# Patient Record
Sex: FEMALE | Race: WHITE | Employment: OTHER | URBAN - METROPOLITAN AREA
[De-identification: names, ages, dates, MRNs, and addresses within clinical notes are randomized per-mention and may not be internally consistent; named-entity substitution may affect disease eponyms.]

---

## 2017-01-11 ENCOUNTER — OFFICE VISIT (OUTPATIENT)
Dept: INTERNAL MEDICINE CLINIC | Age: 67
End: 2017-01-11

## 2017-01-11 ENCOUNTER — CARE COORDINATOR VISIT (OUTPATIENT)
Dept: INTERNAL MEDICINE CLINIC | Age: 67
End: 2017-01-11

## 2017-01-11 VITALS
BODY MASS INDEX: 29.43 KG/M2 | HEIGHT: 59 IN | SYSTOLIC BLOOD PRESSURE: 134 MMHG | DIASTOLIC BLOOD PRESSURE: 70 MMHG | HEART RATE: 64 BPM | WEIGHT: 146 LBS

## 2017-01-11 DIAGNOSIS — I10 BENIGN ESSENTIAL HYPERTENSION: ICD-10-CM

## 2017-01-11 DIAGNOSIS — C34.11 MALIGNANT NEOPLASM OF UPPER LOBE OF RIGHT LUNG (HCC): ICD-10-CM

## 2017-01-11 DIAGNOSIS — M84.375A STRESS FRACTURE OF LEFT FOOT, INITIAL ENCOUNTER: Primary | ICD-10-CM

## 2017-01-11 DIAGNOSIS — E05.90 HYPERTHYROIDISM: ICD-10-CM

## 2017-01-11 PROCEDURE — 99213 OFFICE O/P EST LOW 20 MIN: CPT | Performed by: INTERNAL MEDICINE

## 2017-01-12 LAB
ESTIMATED AVERAGE GLUCOSE: 168.6 MG/DL
HBA1C MFR BLD: 7.5 %

## 2017-02-02 ENCOUNTER — HOSPITAL ENCOUNTER (OUTPATIENT)
Dept: NUCLEAR MEDICINE | Age: 67
Discharge: HOME | End: 2017-02-02
Attending: INTERNAL MEDICINE | Admitting: INTERNAL MEDICINE

## 2017-02-02 DIAGNOSIS — M84.375A STRESS FRACTURE OF LEFT FOOT, INITIAL ENCOUNTER: ICD-10-CM

## 2017-02-02 RX ORDER — TC 99M MEDRONATE 20 MG/10ML
25 INJECTION, POWDER, LYOPHILIZED, FOR SOLUTION INTRAVENOUS
Status: COMPLETED | OUTPATIENT
Start: 2017-02-02 | End: 2017-02-02

## 2017-02-02 RX ADMIN — TC 99M MEDRONATE 25 MILLICURIE: 20 INJECTION, POWDER, LYOPHILIZED, FOR SOLUTION INTRAVENOUS at 08:22

## 2017-02-22 ENCOUNTER — OFFICE VISIT (OUTPATIENT)
Dept: INTERNAL MEDICINE CLINIC | Age: 67
End: 2017-02-22

## 2017-02-22 VITALS
HEART RATE: 64 BPM | WEIGHT: 150 LBS | BODY MASS INDEX: 30.3 KG/M2 | DIASTOLIC BLOOD PRESSURE: 76 MMHG | SYSTOLIC BLOOD PRESSURE: 136 MMHG

## 2017-02-22 DIAGNOSIS — L40.9 SCALP PSORIASIS: Primary | ICD-10-CM

## 2017-02-22 DIAGNOSIS — M79.672 LEFT FOOT PAIN: Chronic | ICD-10-CM

## 2017-02-22 PROCEDURE — 4040F PNEUMOC VAC/ADMIN/RCVD: CPT | Performed by: INTERNAL MEDICINE

## 2017-02-22 PROCEDURE — G8427 DOCREV CUR MEDS BY ELIG CLIN: HCPCS | Performed by: INTERNAL MEDICINE

## 2017-02-22 PROCEDURE — 1090F PRES/ABSN URINE INCON ASSESS: CPT | Performed by: INTERNAL MEDICINE

## 2017-02-22 PROCEDURE — G8417 CALC BMI ABV UP PARAM F/U: HCPCS | Performed by: INTERNAL MEDICINE

## 2017-02-22 PROCEDURE — 99213 OFFICE O/P EST LOW 20 MIN: CPT | Performed by: INTERNAL MEDICINE

## 2017-02-22 PROCEDURE — 3017F COLORECTAL CA SCREEN DOC REV: CPT | Performed by: INTERNAL MEDICINE

## 2017-02-22 PROCEDURE — 3014F SCREEN MAMMO DOC REV: CPT | Performed by: INTERNAL MEDICINE

## 2017-02-22 PROCEDURE — G8484 FLU IMMUNIZE NO ADMIN: HCPCS | Performed by: INTERNAL MEDICINE

## 2017-02-22 PROCEDURE — 1123F ACP DISCUSS/DSCN MKR DOCD: CPT | Performed by: INTERNAL MEDICINE

## 2017-02-22 PROCEDURE — 1036F TOBACCO NON-USER: CPT | Performed by: INTERNAL MEDICINE

## 2017-02-22 PROCEDURE — G8399 PT W/DXA RESULTS DOCUMENT: HCPCS | Performed by: INTERNAL MEDICINE

## 2017-02-22 RX ORDER — TRIAMCINOLONE ACETONIDE 1 MG/G
CREAM TOPICAL
Qty: 453 G | Refills: 3 | Status: SHIPPED | OUTPATIENT
Start: 2017-02-22 | End: 2018-02-26 | Stop reason: ALTCHOICE

## 2017-02-23 ENCOUNTER — OFFICE VISIT (OUTPATIENT)
Dept: ENT CLINIC | Age: 67
End: 2017-02-23

## 2017-02-23 VITALS
WEIGHT: 150 LBS | RESPIRATION RATE: 16 BRPM | BODY MASS INDEX: 29.45 KG/M2 | SYSTOLIC BLOOD PRESSURE: 126 MMHG | DIASTOLIC BLOOD PRESSURE: 81 MMHG | HEIGHT: 60 IN

## 2017-02-23 DIAGNOSIS — H90.5 HEARING DISORDER, SENSORINEURAL: ICD-10-CM

## 2017-02-23 DIAGNOSIS — H93.13 TINNITUS, BILATERAL: Primary | ICD-10-CM

## 2017-02-23 PROBLEM — H93.19 TINNITUS: Status: ACTIVE | Noted: 2017-02-23

## 2017-02-23 PROCEDURE — G8420 CALC BMI NORM PARAMETERS: HCPCS | Performed by: OTOLARYNGOLOGY

## 2017-02-23 PROCEDURE — 3014F SCREEN MAMMO DOC REV: CPT | Performed by: OTOLARYNGOLOGY

## 2017-02-23 PROCEDURE — 1123F ACP DISCUSS/DSCN MKR DOCD: CPT | Performed by: OTOLARYNGOLOGY

## 2017-02-23 PROCEDURE — G8427 DOCREV CUR MEDS BY ELIG CLIN: HCPCS | Performed by: OTOLARYNGOLOGY

## 2017-02-23 PROCEDURE — 1036F TOBACCO NON-USER: CPT | Performed by: AUDIOLOGIST

## 2017-02-23 PROCEDURE — 99203 OFFICE O/P NEW LOW 30 MIN: CPT | Performed by: OTOLARYNGOLOGY

## 2017-02-23 PROCEDURE — G8399 PT W/DXA RESULTS DOCUMENT: HCPCS | Performed by: OTOLARYNGOLOGY

## 2017-02-23 PROCEDURE — 1090F PRES/ABSN URINE INCON ASSESS: CPT | Performed by: OTOLARYNGOLOGY

## 2017-02-23 PROCEDURE — G8484 FLU IMMUNIZE NO ADMIN: HCPCS | Performed by: OTOLARYNGOLOGY

## 2017-02-23 PROCEDURE — 4040F PNEUMOC VAC/ADMIN/RCVD: CPT | Performed by: OTOLARYNGOLOGY

## 2017-02-23 PROCEDURE — 1036F TOBACCO NON-USER: CPT | Performed by: OTOLARYNGOLOGY

## 2017-02-23 PROCEDURE — 3017F COLORECTAL CA SCREEN DOC REV: CPT | Performed by: OTOLARYNGOLOGY

## 2017-02-23 RX ORDER — PREDNISONE 10 MG/1
TABLET ORAL
Qty: 25 TABLET | Refills: 0 | Status: SHIPPED | OUTPATIENT
Start: 2017-02-23 | End: 2017-05-02 | Stop reason: ALTCHOICE

## 2017-02-23 ASSESSMENT — ENCOUNTER SYMPTOMS
SINUS PRESSURE: 0
RESPIRATORY NEGATIVE: 1
EYES NEGATIVE: 1
ALLERGIC/IMMUNOLOGIC NEGATIVE: 1

## 2017-03-07 ENCOUNTER — OFFICE VISIT (OUTPATIENT)
Dept: ORTHOPEDIC SURGERY | Age: 67
End: 2017-03-07

## 2017-03-07 VITALS
HEIGHT: 60 IN | RESPIRATION RATE: 16 BRPM | DIASTOLIC BLOOD PRESSURE: 77 MMHG | BODY MASS INDEX: 29.45 KG/M2 | HEART RATE: 74 BPM | WEIGHT: 150 LBS | SYSTOLIC BLOOD PRESSURE: 127 MMHG

## 2017-03-07 DIAGNOSIS — M16.11 PRIMARY OSTEOARTHRITIS OF RIGHT HIP: ICD-10-CM

## 2017-03-07 DIAGNOSIS — S90.32XA CONTUSION OF LEFT FOOT, INITIAL ENCOUNTER: Primary | ICD-10-CM

## 2017-03-07 PROCEDURE — G8427 DOCREV CUR MEDS BY ELIG CLIN: HCPCS | Performed by: ORTHOPAEDIC SURGERY

## 2017-03-07 PROCEDURE — G8484 FLU IMMUNIZE NO ADMIN: HCPCS | Performed by: ORTHOPAEDIC SURGERY

## 2017-03-07 PROCEDURE — G8420 CALC BMI NORM PARAMETERS: HCPCS | Performed by: ORTHOPAEDIC SURGERY

## 2017-03-07 PROCEDURE — 1036F TOBACCO NON-USER: CPT | Performed by: ORTHOPAEDIC SURGERY

## 2017-03-07 PROCEDURE — 73502 X-RAY EXAM HIP UNI 2-3 VIEWS: CPT | Performed by: ORTHOPAEDIC SURGERY

## 2017-03-07 PROCEDURE — 73630 X-RAY EXAM OF FOOT: CPT | Performed by: ORTHOPAEDIC SURGERY

## 2017-03-07 PROCEDURE — 99203 OFFICE O/P NEW LOW 30 MIN: CPT | Performed by: ORTHOPAEDIC SURGERY

## 2017-03-07 PROCEDURE — 4040F PNEUMOC VAC/ADMIN/RCVD: CPT | Performed by: ORTHOPAEDIC SURGERY

## 2017-03-07 PROCEDURE — 3017F COLORECTAL CA SCREEN DOC REV: CPT | Performed by: ORTHOPAEDIC SURGERY

## 2017-03-07 PROCEDURE — 1090F PRES/ABSN URINE INCON ASSESS: CPT | Performed by: ORTHOPAEDIC SURGERY

## 2017-03-07 PROCEDURE — 3014F SCREEN MAMMO DOC REV: CPT | Performed by: ORTHOPAEDIC SURGERY

## 2017-03-07 RX ORDER — MELOXICAM 7.5 MG/1
7.5 TABLET ORAL DAILY
Qty: 30 TABLET | Refills: 0 | Status: SHIPPED | OUTPATIENT
Start: 2017-03-07 | End: 2017-03-30 | Stop reason: SDUPTHER

## 2017-03-08 PROBLEM — S90.32XA CONTUSION OF LEFT FOOT: Status: ACTIVE | Noted: 2017-03-08

## 2017-03-08 PROBLEM — M16.11 PRIMARY OSTEOARTHRITIS OF RIGHT HIP: Status: ACTIVE | Noted: 2017-03-08

## 2017-03-30 DIAGNOSIS — M16.11 PRIMARY OSTEOARTHRITIS OF RIGHT HIP: ICD-10-CM

## 2017-04-03 ENCOUNTER — OFFICE VISIT (OUTPATIENT)
Dept: PULMONOLOGY | Age: 67
End: 2017-04-03

## 2017-04-03 VITALS
BODY MASS INDEX: 30.27 KG/M2 | WEIGHT: 155 LBS | HEART RATE: 78 BPM | SYSTOLIC BLOOD PRESSURE: 122 MMHG | DIASTOLIC BLOOD PRESSURE: 71 MMHG

## 2017-04-03 DIAGNOSIS — J44.9 COPD, MODERATE (HCC): Primary | ICD-10-CM

## 2017-04-03 DIAGNOSIS — I10 BENIGN ESSENTIAL HYPERTENSION: ICD-10-CM

## 2017-04-03 DIAGNOSIS — C34.11 MALIGNANT NEOPLASM OF UPPER LOBE OF RIGHT LUNG (HCC): ICD-10-CM

## 2017-04-03 PROCEDURE — 3014F SCREEN MAMMO DOC REV: CPT | Performed by: INTERNAL MEDICINE

## 2017-04-03 PROCEDURE — 1036F TOBACCO NON-USER: CPT | Performed by: INTERNAL MEDICINE

## 2017-04-03 PROCEDURE — 3017F COLORECTAL CA SCREEN DOC REV: CPT | Performed by: INTERNAL MEDICINE

## 2017-04-03 PROCEDURE — 1090F PRES/ABSN URINE INCON ASSESS: CPT | Performed by: INTERNAL MEDICINE

## 2017-04-03 PROCEDURE — G8399 PT W/DXA RESULTS DOCUMENT: HCPCS | Performed by: INTERNAL MEDICINE

## 2017-04-03 PROCEDURE — 4040F PNEUMOC VAC/ADMIN/RCVD: CPT | Performed by: INTERNAL MEDICINE

## 2017-04-03 PROCEDURE — 3045F PR MOST RECENT HEMOGLOBIN A1C LEVEL 7.0-9.0%: CPT | Performed by: INTERNAL MEDICINE

## 2017-04-03 PROCEDURE — 99214 OFFICE O/P EST MOD 30 MIN: CPT | Performed by: INTERNAL MEDICINE

## 2017-04-03 PROCEDURE — G8427 DOCREV CUR MEDS BY ELIG CLIN: HCPCS | Performed by: INTERNAL MEDICINE

## 2017-04-03 PROCEDURE — 3023F SPIROM DOC REV: CPT | Performed by: INTERNAL MEDICINE

## 2017-04-03 PROCEDURE — G8926 SPIRO NO PERF OR DOC: HCPCS | Performed by: INTERNAL MEDICINE

## 2017-04-03 PROCEDURE — G8417 CALC BMI ABV UP PARAM F/U: HCPCS | Performed by: INTERNAL MEDICINE

## 2017-04-03 PROCEDURE — 1123F ACP DISCUSS/DSCN MKR DOCD: CPT | Performed by: INTERNAL MEDICINE

## 2017-04-03 RX ORDER — MELOXICAM 7.5 MG/1
TABLET ORAL
Qty: 30 TABLET | Refills: 0 | Status: SHIPPED | OUTPATIENT
Start: 2017-04-03 | End: 2017-04-26 | Stop reason: SDUPTHER

## 2017-04-12 ENCOUNTER — HOSPITAL ENCOUNTER (OUTPATIENT)
Dept: CT IMAGING | Age: 67
Discharge: HOME | End: 2017-04-12
Attending: INTERNAL MEDICINE | Admitting: INTERNAL MEDICINE

## 2017-04-12 DIAGNOSIS — C34.11 MALIGNANT NEOPLASM OF UPPER LOBE OF RIGHT LUNG (HCC): ICD-10-CM

## 2017-04-12 DIAGNOSIS — C34.11 MALIGNANT NEOPLASM OF UPPER LOBE, RIGHT BRONCHUS OR LUNG (HCC): ICD-10-CM

## 2017-04-26 DIAGNOSIS — M16.11 PRIMARY OSTEOARTHRITIS OF RIGHT HIP: ICD-10-CM

## 2017-04-26 RX ORDER — MELOXICAM 7.5 MG/1
TABLET ORAL
Qty: 30 TABLET | Refills: 0 | Status: SHIPPED | OUTPATIENT
Start: 2017-04-26 | End: 2017-05-29 | Stop reason: SDUPTHER

## 2017-05-02 ENCOUNTER — OFFICE VISIT (OUTPATIENT)
Dept: INTERNAL MEDICINE CLINIC | Age: 67
End: 2017-05-02

## 2017-05-02 VITALS
DIASTOLIC BLOOD PRESSURE: 74 MMHG | BODY MASS INDEX: 30.47 KG/M2 | WEIGHT: 156 LBS | HEART RATE: 64 BPM | SYSTOLIC BLOOD PRESSURE: 120 MMHG

## 2017-05-02 DIAGNOSIS — M79.605 PAIN IN BOTH LOWER EXTREMITIES: Primary | ICD-10-CM

## 2017-05-02 DIAGNOSIS — E05.90 HYPERTHYROIDISM: ICD-10-CM

## 2017-05-02 DIAGNOSIS — I10 BENIGN ESSENTIAL HYPERTENSION: ICD-10-CM

## 2017-05-02 DIAGNOSIS — I87.2 VENOUS INSUFFICIENCY: ICD-10-CM

## 2017-05-02 DIAGNOSIS — R60.0 BILATERAL EDEMA OF LOWER EXTREMITY: ICD-10-CM

## 2017-05-02 DIAGNOSIS — J44.9 COPD, MODERATE (HCC): ICD-10-CM

## 2017-05-02 DIAGNOSIS — M79.604 PAIN IN BOTH LOWER EXTREMITIES: Primary | ICD-10-CM

## 2017-05-02 LAB
ANION GAP SERPL CALCULATED.3IONS-SCNC: 14 MMOL/L (ref 3–16)
BUN BLDV-MCNC: 15 MG/DL (ref 7–20)
CALCIUM SERPL-MCNC: 9.9 MG/DL (ref 8.3–10.6)
CHLORIDE BLD-SCNC: 100 MMOL/L (ref 99–110)
CO2: 26 MMOL/L (ref 21–32)
CREAT SERPL-MCNC: 1.1 MG/DL (ref 0.6–1.2)
GFR AFRICAN AMERICAN: 60
GFR NON-AFRICAN AMERICAN: 49
GLUCOSE BLD-MCNC: 108 MG/DL (ref 70–99)
POTASSIUM SERPL-SCNC: 4.2 MMOL/L (ref 3.5–5.1)
SODIUM BLD-SCNC: 140 MMOL/L (ref 136–145)
T4 FREE: <0.1 NG/DL (ref 0.9–1.8)
TSH SERPL DL<=0.05 MIU/L-ACNC: 77.9 UIU/ML (ref 0.27–4.2)

## 2017-05-02 PROCEDURE — 94640 AIRWAY INHALATION TREATMENT: CPT | Performed by: INTERNAL MEDICINE

## 2017-05-02 PROCEDURE — 99214 OFFICE O/P EST MOD 30 MIN: CPT | Performed by: INTERNAL MEDICINE

## 2017-05-02 PROCEDURE — 3045F PR MOST RECENT HEMOGLOBIN A1C LEVEL 7.0-9.0%: CPT | Performed by: INTERNAL MEDICINE

## 2017-05-02 PROCEDURE — G8926 SPIRO NO PERF OR DOC: HCPCS | Performed by: INTERNAL MEDICINE

## 2017-05-02 PROCEDURE — 3023F SPIROM DOC REV: CPT | Performed by: INTERNAL MEDICINE

## 2017-05-02 PROCEDURE — G8417 CALC BMI ABV UP PARAM F/U: HCPCS | Performed by: INTERNAL MEDICINE

## 2017-05-02 PROCEDURE — 1123F ACP DISCUSS/DSCN MKR DOCD: CPT | Performed by: INTERNAL MEDICINE

## 2017-05-02 PROCEDURE — 3014F SCREEN MAMMO DOC REV: CPT | Performed by: INTERNAL MEDICINE

## 2017-05-02 PROCEDURE — G8399 PT W/DXA RESULTS DOCUMENT: HCPCS | Performed by: INTERNAL MEDICINE

## 2017-05-02 PROCEDURE — G8427 DOCREV CUR MEDS BY ELIG CLIN: HCPCS | Performed by: INTERNAL MEDICINE

## 2017-05-02 PROCEDURE — 1036F TOBACCO NON-USER: CPT | Performed by: INTERNAL MEDICINE

## 2017-05-02 PROCEDURE — 3017F COLORECTAL CA SCREEN DOC REV: CPT | Performed by: INTERNAL MEDICINE

## 2017-05-02 PROCEDURE — 1090F PRES/ABSN URINE INCON ASSESS: CPT | Performed by: INTERNAL MEDICINE

## 2017-05-02 PROCEDURE — 4040F PNEUMOC VAC/ADMIN/RCVD: CPT | Performed by: INTERNAL MEDICINE

## 2017-05-02 RX ORDER — ALBUTEROL SULFATE 2.5 MG/3ML
2.5 SOLUTION RESPIRATORY (INHALATION) ONCE
Status: COMPLETED | OUTPATIENT
Start: 2017-05-02 | End: 2017-05-02

## 2017-05-02 RX ORDER — PREDNISONE 20 MG/1
20 TABLET ORAL DAILY
Qty: 4 TABLET | Refills: 0 | Status: SHIPPED | OUTPATIENT
Start: 2017-05-02 | End: 2017-05-06

## 2017-05-02 RX ADMIN — ALBUTEROL SULFATE 2.5 MG: 2.5 SOLUTION RESPIRATORY (INHALATION) at 15:58

## 2017-05-03 DIAGNOSIS — E05.90 HYPERTHYROIDISM: Primary | ICD-10-CM

## 2017-05-03 LAB
ESTIMATED AVERAGE GLUCOSE: 177.2 MG/DL
HBA1C MFR BLD: 7.8 %

## 2017-05-22 RX ORDER — FLUTICASONE FUROATE AND VILANTEROL 100; 25 UG/1; UG/1
1 POWDER RESPIRATORY (INHALATION) DAILY
Qty: 1 EACH | Refills: 6 | Status: SHIPPED | OUTPATIENT
Start: 2017-05-22 | End: 2018-06-08 | Stop reason: SDUPTHER

## 2017-05-24 ENCOUNTER — OFFICE VISIT (OUTPATIENT)
Dept: INTERNAL MEDICINE CLINIC | Age: 67
End: 2017-05-24

## 2017-05-24 VITALS
BODY MASS INDEX: 29.29 KG/M2 | HEIGHT: 60 IN | WEIGHT: 149.2 LBS | DIASTOLIC BLOOD PRESSURE: 74 MMHG | SYSTOLIC BLOOD PRESSURE: 116 MMHG | HEART RATE: 80 BPM

## 2017-05-24 DIAGNOSIS — F33.42 RECURRENT MAJOR DEPRESSIVE EPISODES, IN FULL REMISSION (HCC): ICD-10-CM

## 2017-05-24 DIAGNOSIS — E05.90 HYPERTHYROIDISM: Primary | ICD-10-CM

## 2017-05-24 PROCEDURE — G8420 CALC BMI NORM PARAMETERS: HCPCS | Performed by: INTERNAL MEDICINE

## 2017-05-24 PROCEDURE — 1123F ACP DISCUSS/DSCN MKR DOCD: CPT | Performed by: INTERNAL MEDICINE

## 2017-05-24 PROCEDURE — G8399 PT W/DXA RESULTS DOCUMENT: HCPCS | Performed by: INTERNAL MEDICINE

## 2017-05-24 PROCEDURE — 1090F PRES/ABSN URINE INCON ASSESS: CPT | Performed by: INTERNAL MEDICINE

## 2017-05-24 PROCEDURE — 3014F SCREEN MAMMO DOC REV: CPT | Performed by: INTERNAL MEDICINE

## 2017-05-24 PROCEDURE — 1036F TOBACCO NON-USER: CPT | Performed by: INTERNAL MEDICINE

## 2017-05-24 PROCEDURE — 4040F PNEUMOC VAC/ADMIN/RCVD: CPT | Performed by: INTERNAL MEDICINE

## 2017-05-24 PROCEDURE — 3017F COLORECTAL CA SCREEN DOC REV: CPT | Performed by: INTERNAL MEDICINE

## 2017-05-24 PROCEDURE — G8427 DOCREV CUR MEDS BY ELIG CLIN: HCPCS | Performed by: INTERNAL MEDICINE

## 2017-05-24 PROCEDURE — 99213 OFFICE O/P EST LOW 20 MIN: CPT | Performed by: INTERNAL MEDICINE

## 2017-05-25 LAB
T4 FREE: 1.2 NG/DL (ref 0.9–1.8)
TSH SERPL DL<=0.05 MIU/L-ACNC: 3.52 UIU/ML (ref 0.27–4.2)

## 2017-05-29 DIAGNOSIS — M16.11 PRIMARY OSTEOARTHRITIS OF RIGHT HIP: ICD-10-CM

## 2017-05-30 RX ORDER — MELOXICAM 7.5 MG/1
TABLET ORAL
Qty: 30 TABLET | Refills: 0 | Status: SHIPPED | OUTPATIENT
Start: 2017-05-30 | End: 2018-02-26 | Stop reason: ALTCHOICE

## 2017-08-23 ENCOUNTER — OFFICE VISIT (OUTPATIENT)
Dept: INTERNAL MEDICINE CLINIC | Age: 67
End: 2017-08-23

## 2017-08-23 VITALS
DIASTOLIC BLOOD PRESSURE: 74 MMHG | HEART RATE: 88 BPM | WEIGHT: 146 LBS | SYSTOLIC BLOOD PRESSURE: 110 MMHG | BODY MASS INDEX: 28.51 KG/M2

## 2017-08-23 DIAGNOSIS — Z13.220 SCREENING FOR HYPERLIPIDEMIA: ICD-10-CM

## 2017-08-23 DIAGNOSIS — M75.101 ROTATOR CUFF SYNDROME, RIGHT: ICD-10-CM

## 2017-08-23 DIAGNOSIS — F33.41 RECURRENT MAJOR DEPRESSIVE DISORDER, IN PARTIAL REMISSION (HCC): Chronic | ICD-10-CM

## 2017-08-23 DIAGNOSIS — Z12.31 ENCOUNTER FOR SCREENING MAMMOGRAM FOR BREAST CANCER: ICD-10-CM

## 2017-08-23 DIAGNOSIS — E05.90 HYPERTHYROIDISM: ICD-10-CM

## 2017-08-23 DIAGNOSIS — I10 BENIGN ESSENTIAL HYPERTENSION: ICD-10-CM

## 2017-08-23 LAB
CHOLESTEROL, TOTAL: 183 MG/DL (ref 0–199)
CREATININE URINE: 97.4 MG/DL (ref 28–259)
HDLC SERPL-MCNC: 45 MG/DL (ref 40–60)
LDL CHOLESTEROL CALCULATED: ABNORMAL MG/DL
LDL CHOLESTEROL DIRECT: 78 MG/DL
MICROALBUMIN UR-MCNC: <1.2 MG/DL
MICROALBUMIN/CREAT UR-RTO: NORMAL MG/G (ref 0–30)
T4 FREE: 1.4 NG/DL (ref 0.9–1.8)
TRIGL SERPL-MCNC: 394 MG/DL (ref 0–150)
TSH SERPL DL<=0.05 MIU/L-ACNC: 0.18 UIU/ML (ref 0.27–4.2)
VLDLC SERPL CALC-MCNC: ABNORMAL MG/DL

## 2017-08-23 PROCEDURE — 3046F HEMOGLOBIN A1C LEVEL >9.0%: CPT | Performed by: INTERNAL MEDICINE

## 2017-08-23 PROCEDURE — 1036F TOBACCO NON-USER: CPT | Performed by: INTERNAL MEDICINE

## 2017-08-23 PROCEDURE — 4040F PNEUMOC VAC/ADMIN/RCVD: CPT | Performed by: INTERNAL MEDICINE

## 2017-08-23 PROCEDURE — 3017F COLORECTAL CA SCREEN DOC REV: CPT | Performed by: INTERNAL MEDICINE

## 2017-08-23 PROCEDURE — 1090F PRES/ABSN URINE INCON ASSESS: CPT | Performed by: INTERNAL MEDICINE

## 2017-08-23 PROCEDURE — 1123F ACP DISCUSS/DSCN MKR DOCD: CPT | Performed by: INTERNAL MEDICINE

## 2017-08-23 PROCEDURE — G8417 CALC BMI ABV UP PARAM F/U: HCPCS | Performed by: INTERNAL MEDICINE

## 2017-08-23 PROCEDURE — 3014F SCREEN MAMMO DOC REV: CPT | Performed by: INTERNAL MEDICINE

## 2017-08-23 PROCEDURE — 99214 OFFICE O/P EST MOD 30 MIN: CPT | Performed by: INTERNAL MEDICINE

## 2017-08-23 PROCEDURE — G8427 DOCREV CUR MEDS BY ELIG CLIN: HCPCS | Performed by: INTERNAL MEDICINE

## 2017-08-23 PROCEDURE — G8399 PT W/DXA RESULTS DOCUMENT: HCPCS | Performed by: INTERNAL MEDICINE

## 2017-08-23 ASSESSMENT — ENCOUNTER SYMPTOMS: SHORTNESS OF BREATH: 0

## 2017-10-11 ENCOUNTER — HOSPITAL ENCOUNTER (OUTPATIENT)
Dept: CT IMAGING | Age: 67
Discharge: HOME | End: 2017-10-11
Attending: INTERNAL MEDICINE | Admitting: INTERNAL MEDICINE

## 2017-10-11 DIAGNOSIS — C34.90 MALIGNANT NEOPLASM OF BRONCHUS AND LUNG (HCC): ICD-10-CM

## 2017-10-11 DIAGNOSIS — C34.11 MALIGNANT NEOPLASM OF UPPER LOBE BRONCHUS, RIGHT (HCC): ICD-10-CM

## 2017-10-23 DIAGNOSIS — F41.9 ANXIETY: Primary | ICD-10-CM

## 2017-10-23 RX ORDER — ALPRAZOLAM 0.5 MG/1
TABLET ORAL
Qty: 80 TABLET | Refills: 0 | Status: SHIPPED | OUTPATIENT
Start: 2017-10-23 | End: 2018-01-22 | Stop reason: SDUPTHER

## 2017-10-27 ENCOUNTER — HOSPITAL ENCOUNTER (OUTPATIENT)
Dept: OTHER | Age: 67
Discharge: HOME | End: 2017-10-27
Attending: INTERNAL MEDICINE | Admitting: INTERNAL MEDICINE

## 2017-10-27 ENCOUNTER — OFFICE VISIT (OUTPATIENT)
Dept: INTERNAL MEDICINE CLINIC | Age: 67
End: 2017-10-27

## 2017-10-27 VITALS
WEIGHT: 149 LBS | BODY MASS INDEX: 29.1 KG/M2 | HEART RATE: 84 BPM | DIASTOLIC BLOOD PRESSURE: 68 MMHG | SYSTOLIC BLOOD PRESSURE: 120 MMHG

## 2017-10-27 DIAGNOSIS — E05.90 HYPERTHYROIDISM: ICD-10-CM

## 2017-10-27 DIAGNOSIS — M54.5 LOW BACK PAIN, UNSPECIFIED BACK PAIN LATERALITY, UNSPECIFIED CHRONICITY, WITH SCIATICA PRESENCE UNSPECIFIED: ICD-10-CM

## 2017-10-27 DIAGNOSIS — M54.50 LUMBAR SPINE PAIN: ICD-10-CM

## 2017-10-27 DIAGNOSIS — R29.898 UPPER EXTREMITY WEAKNESS: ICD-10-CM

## 2017-10-27 DIAGNOSIS — M54.2 CERVICAL SPINE PAIN: Primary | ICD-10-CM

## 2017-10-27 DIAGNOSIS — M54.2 CERVICALGIA: ICD-10-CM

## 2017-10-27 LAB
SEDIMENTATION RATE, ERYTHROCYTE: 44 MM/HR (ref 0–30)
T4 FREE: 1.8 NG/DL (ref 0.9–1.8)
TSH SERPL DL<=0.05 MIU/L-ACNC: <0.01 UIU/ML (ref 0.27–4.2)

## 2017-10-27 PROCEDURE — G8484 FLU IMMUNIZE NO ADMIN: HCPCS | Performed by: INTERNAL MEDICINE

## 2017-10-27 PROCEDURE — 3014F SCREEN MAMMO DOC REV: CPT | Performed by: INTERNAL MEDICINE

## 2017-10-27 PROCEDURE — G8427 DOCREV CUR MEDS BY ELIG CLIN: HCPCS | Performed by: INTERNAL MEDICINE

## 2017-10-27 PROCEDURE — 1123F ACP DISCUSS/DSCN MKR DOCD: CPT | Performed by: INTERNAL MEDICINE

## 2017-10-27 PROCEDURE — 4040F PNEUMOC VAC/ADMIN/RCVD: CPT | Performed by: INTERNAL MEDICINE

## 2017-10-27 PROCEDURE — G8399 PT W/DXA RESULTS DOCUMENT: HCPCS | Performed by: INTERNAL MEDICINE

## 2017-10-27 PROCEDURE — 99213 OFFICE O/P EST LOW 20 MIN: CPT | Performed by: INTERNAL MEDICINE

## 2017-10-27 PROCEDURE — 1090F PRES/ABSN URINE INCON ASSESS: CPT | Performed by: INTERNAL MEDICINE

## 2017-10-27 PROCEDURE — 3017F COLORECTAL CA SCREEN DOC REV: CPT | Performed by: INTERNAL MEDICINE

## 2017-10-27 PROCEDURE — G8417 CALC BMI ABV UP PARAM F/U: HCPCS | Performed by: INTERNAL MEDICINE

## 2017-10-27 PROCEDURE — 1036F TOBACCO NON-USER: CPT | Performed by: INTERNAL MEDICINE

## 2017-10-27 RX ORDER — PREDNISONE 20 MG/1
20 TABLET ORAL DAILY
Qty: 5 TABLET | Refills: 0 | Status: SHIPPED | OUTPATIENT
Start: 2017-10-27 | End: 2017-11-01

## 2017-10-27 NOTE — PROGRESS NOTES
SUBJECTIVE:  Patient ID: Maria De Jesus Arango is a 75 y.o. y.o. female     HPI    Maria De Jesus Arango returns to the office for follow up of osteoarthritis. Patient is complaining of neck pain that radiates down both of her arms. It has been present for the last several days. Patient reports that it was fairly sudden in onset. She has not been taking any medication for relief of her symptoms. Patient does have oxycodone available to her but she forces that has not been helpful. Review of Systems    OBJECTIVE:    /68   Pulse 84   Wt 149 lb (67.6 kg)   BMI 29.10 kg/m²    Physical Exam     Current Outpatient Prescriptions:     ALPRAZolam (XANAX) 0.5 MG tablet, TAKE 1 TABLET BY MOUTH THREE TIMES DAILY AS NEEDED, Disp: 80 tablet, Rfl: 0    oxyCODONE (ROXICODONE) 5 MG immediate release tablet, Take 1 tablet by mouth every 4-6 hours as needed for pain. Earliest Fill Date: 8/15/17, Disp: 100 tablet, Rfl: 0    gabapentin (NEURONTIN) 100 MG capsule, TAKE 1 CAPSULE BY MOUTH EVERY NIGHT AT BEDTIME, Disp: 90 capsule, Rfl: 0    meloxicam (MOBIC) 7.5 MG tablet, TAKE 1 TABLET BY MOUTH DAILY, Disp: 30 tablet, Rfl: 0    fluticasone-vilanterol (BREO ELLIPTA) 100-25 MCG/INH AEPB inhaler, Inhale 1 puff into the lungs daily, Disp: 1 each, Rfl: 6    metoprolol succinate (TOPROL XL) 50 MG extended release tablet, TAKE 1 TABLET BY MOUTH EVERY DAY, Disp: 90 tablet, Rfl: 3    atorvastatin (LIPITOR) 20 MG tablet, TAKE 1 TABLET BY MOUTH EVERY DAY, Disp: 90 tablet, Rfl: 3    triamcinolone (KENALOG) 0.1 % cream, Apply topically 2 times daily. , Disp: 453 g, Rfl: 3    metFORMIN (GLUCOPHAGE) 1000 MG tablet, TAKE 1 TABLET BY MOUTH TWICE DAILY WITH MEALS, Disp: 180 tablet, Rfl: 3    buPROPion (WELLBUTRIN SR) 150 MG extended release tablet, TAKE 1 TABLET BY MOUTH TWICE DAILY, Disp: 180 tablet, Rfl: 3    magnesium oxide (MAG-OX) 400 (241.3 MG) MG TABS tablet, Take 1 tablet by mouth 2 times daily, Disp: 60 tablet, Rfl: 1   mirtazapine (REMERON) 15 MG tablet, Take 1 tablet by mouth nightly, Disp: 30 tablet, Rfl: 3    ondansetron (ZOFRAN) 4 MG tablet, Take 1 tablet by mouth every 6 hours as needed for Nausea or Vomiting, Disp: 40 tablet, Rfl: 3    pantoprazole (PROTONIX) 40 MG tablet, Take 1 tablet by mouth daily, Disp: 30 tablet, Rfl: 0    aspirin 81 MG chewable tablet, Take 81 mg by mouth daily Indications: OTC, Disp: , Rfl:     albuterol sulfate  (90 BASE) MCG/ACT inhaler, Inhale 2 puffs into the lungs every 6 hours as needed for Wheezing, Disp: 1 Inhaler, Rfl: 11    glucose blood VI test strips (TRUETEST TEST) strip, 1 each by In Vitro route 4 times daily as needed, Disp: 100 each, Rfl: 3    Assessment/Plan:  Milad Tenorio was seen today for pain. Diagnoses and all orders for this visit:    Cervical spine pain  -     SEDIMENTATION RATE; Future  -     XR CERVICAL SPINE (2-3 VIEWS)  -     XR LUMBAR SPINE (2-3 VIEWS)  -     MRI Cervical Spine With Contrast; Future    Hyperthyroidism  -     TSH without Reflex  -     T4, Free    Lumbar spine pain  -     SEDIMENTATION RATE; Future  -     XR CERVICAL SPINE (2-3 VIEWS)  -     XR LUMBAR SPINE (2-3 VIEWS)    Upper extremity weakness  -     MRI Cervical Spine With Contrast; Future    Other orders  -     predniSONE (DELTASONE) 20 MG tablet;  Take 1 tablet by mouth daily for 5 days        Rosa Aguirre MD

## 2017-10-30 ENCOUNTER — HOSPITAL ENCOUNTER (OUTPATIENT)
Dept: MRI IMAGING | Age: 67
Discharge: HOME | End: 2017-10-30
Attending: INTERNAL MEDICINE | Admitting: INTERNAL MEDICINE

## 2017-10-30 DIAGNOSIS — G95.9 CERVICAL MYELOPATHY (HCC): Primary | ICD-10-CM

## 2017-10-30 DIAGNOSIS — I10 BENIGN ESSENTIAL HYPERTENSION: Primary | ICD-10-CM

## 2017-10-30 DIAGNOSIS — G95.9 CERVICAL MYELOPATHY (HCC): ICD-10-CM

## 2017-10-30 DIAGNOSIS — G95.9 DISEASE OF SPINAL CORD (HCC): ICD-10-CM

## 2017-10-30 LAB
A/G RATIO: 1.1 (ref 1.1–2.2)
ALBUMIN SERPL-MCNC: 3.5 G/DL (ref 3.4–5)
ALP BLD-CCNC: 114 U/L (ref 40–129)
ALT SERPL-CCNC: 28 U/L (ref 10–40)
ANION GAP SERPL CALCULATED.3IONS-SCNC: 12 MMOL/L (ref 3–16)
AST SERPL-CCNC: 22 U/L (ref 15–37)
BILIRUB SERPL-MCNC: 0.4 MG/DL (ref 0–1)
BUN BLDV-MCNC: 14 MG/DL (ref 7–20)
CALCIUM SERPL-MCNC: 8.4 MG/DL (ref 8.3–10.6)
CHLORIDE BLD-SCNC: 106 MMOL/L (ref 99–110)
CO2: 24 MMOL/L (ref 21–32)
CREAT SERPL-MCNC: 0.6 MG/DL (ref 0.6–1.2)
GFR AFRICAN AMERICAN: >60
GFR NON-AFRICAN AMERICAN: >60
GLOBULIN: 3.2 G/DL
GLUCOSE BLD-MCNC: 259 MG/DL (ref 70–99)
POTASSIUM SERPL-SCNC: 4.2 MMOL/L (ref 3.5–5.1)
SODIUM BLD-SCNC: 142 MMOL/L (ref 136–145)
TOTAL PROTEIN: 6.7 G/DL (ref 6.4–8.2)

## 2017-10-30 RX ORDER — SODIUM CHLORIDE 0.9 % (FLUSH) 0.9 %
10 SYRINGE (ML) INJECTION ONCE
Status: COMPLETED | OUTPATIENT
Start: 2017-10-30 | End: 2017-10-30

## 2017-10-30 RX ADMIN — Medication 10 ML: at 14:51

## 2017-11-03 ENCOUNTER — OFFICE VISIT (OUTPATIENT)
Dept: ORTHOPEDIC SURGERY | Age: 67
End: 2017-11-03

## 2017-11-03 VITALS
BODY MASS INDEX: 29.25 KG/M2 | TEMPERATURE: 98.4 F | DIASTOLIC BLOOD PRESSURE: 77 MMHG | WEIGHT: 149 LBS | HEART RATE: 106 BPM | HEIGHT: 60 IN | SYSTOLIC BLOOD PRESSURE: 134 MMHG

## 2017-11-03 DIAGNOSIS — M54.2 CERVICALGIA: Primary | ICD-10-CM

## 2017-11-03 DIAGNOSIS — M75.80 ROTATOR CUFF TENDINITIS, UNSPECIFIED LATERALITY: ICD-10-CM

## 2017-11-03 DIAGNOSIS — M54.12 CERVICAL RADICULITIS: ICD-10-CM

## 2017-11-03 PROCEDURE — 4040F PNEUMOC VAC/ADMIN/RCVD: CPT | Performed by: ORTHOPAEDIC SURGERY

## 2017-11-03 PROCEDURE — 3017F COLORECTAL CA SCREEN DOC REV: CPT | Performed by: ORTHOPAEDIC SURGERY

## 2017-11-03 PROCEDURE — G8484 FLU IMMUNIZE NO ADMIN: HCPCS | Performed by: ORTHOPAEDIC SURGERY

## 2017-11-03 PROCEDURE — 3014F SCREEN MAMMO DOC REV: CPT | Performed by: ORTHOPAEDIC SURGERY

## 2017-11-03 PROCEDURE — 20610 DRAIN/INJ JOINT/BURSA W/O US: CPT | Performed by: ORTHOPAEDIC SURGERY

## 2017-11-03 PROCEDURE — G8427 DOCREV CUR MEDS BY ELIG CLIN: HCPCS | Performed by: ORTHOPAEDIC SURGERY

## 2017-11-03 PROCEDURE — 1090F PRES/ABSN URINE INCON ASSESS: CPT | Performed by: ORTHOPAEDIC SURGERY

## 2017-11-03 PROCEDURE — G8417 CALC BMI ABV UP PARAM F/U: HCPCS | Performed by: ORTHOPAEDIC SURGERY

## 2017-11-03 PROCEDURE — 99215 OFFICE O/P EST HI 40 MIN: CPT | Performed by: ORTHOPAEDIC SURGERY

## 2017-11-03 RX ORDER — BETAMETHASONE SODIUM PHOSPHATE AND BETAMETHASONE ACETATE 3; 3 MG/ML; MG/ML
6 INJECTION, SUSPENSION INTRA-ARTICULAR; INTRALESIONAL; INTRAMUSCULAR; SOFT TISSUE ONCE
Qty: 1 ML | Refills: 0
Start: 2017-11-03 | End: 2017-11-03

## 2017-11-03 NOTE — PROGRESS NOTES
Father      & TB history    Hypertension Father     Diabetes Sister     Diabetes Brother     Stroke Son     Osteoporosis Mother      & Angina    Anesth Problems Neg Hx     Malig Hyperten Neg Hx     Hypotension Neg Hx     Malig Hypertherm Neg Hx     Pseudochol. Deficiency Neg Hx        Current Outpatient Prescriptions   Medication Sig Dispense Refill    ALPRAZolam (XANAX) 0.5 MG tablet TAKE 1 TABLET BY MOUTH THREE TIMES DAILY AS NEEDED 80 tablet 0    oxyCODONE (ROXICODONE) 5 MG immediate release tablet Take 1 tablet by mouth every 4-6 hours as needed for pain. Earliest Fill Date: 8/15/17 100 tablet 0    gabapentin (NEURONTIN) 100 MG capsule TAKE 1 CAPSULE BY MOUTH EVERY NIGHT AT BEDTIME 90 capsule 0    meloxicam (MOBIC) 7.5 MG tablet TAKE 1 TABLET BY MOUTH DAILY 30 tablet 0    fluticasone-vilanterol (BREO ELLIPTA) 100-25 MCG/INH AEPB inhaler Inhale 1 puff into the lungs daily 1 each 6    metoprolol succinate (TOPROL XL) 50 MG extended release tablet TAKE 1 TABLET BY MOUTH EVERY DAY 90 tablet 3    atorvastatin (LIPITOR) 20 MG tablet TAKE 1 TABLET BY MOUTH EVERY DAY 90 tablet 3    triamcinolone (KENALOG) 0.1 % cream Apply topically 2 times daily.  453 g 3    metFORMIN (GLUCOPHAGE) 1000 MG tablet TAKE 1 TABLET BY MOUTH TWICE DAILY WITH MEALS 180 tablet 3    buPROPion (WELLBUTRIN SR) 150 MG extended release tablet TAKE 1 TABLET BY MOUTH TWICE DAILY 180 tablet 3    magnesium oxide (MAG-OX) 400 (241.3 MG) MG TABS tablet Take 1 tablet by mouth 2 times daily 60 tablet 1    mirtazapine (REMERON) 15 MG tablet Take 1 tablet by mouth nightly 30 tablet 3    ondansetron (ZOFRAN) 4 MG tablet Take 1 tablet by mouth every 6 hours as needed for Nausea or Vomiting 40 tablet 3    pantoprazole (PROTONIX) 40 MG tablet Take 1 tablet by mouth daily 30 tablet 0    aspirin 81 MG chewable tablet Take 81 mg by mouth daily Indications: OTC      albuterol sulfate  (90 BASE) MCG/ACT inhaler Inhale 2 puffs into the lungs every 6 hours as needed for Wheezing 1 Inhaler 11    glucose blood VI test strips (TRUETEST TEST) strip 1 each by In Vitro route 4 times daily as needed 100 each 3     No current facility-administered medications for this visit. Physical Exam:  Pain Score:   3 (Bilat)  /77   Pulse 106   Temp 98.4 °F (36.9 °C)   Ht 5' (1.524 m)   Wt 149 lb (67.6 kg)   BMI 29.10 kg/m²     Constitutional: Alert and in no distress. Oriented to person place and time. Neuro-Muscular exam:     Cervical spine:  Range of motion is limited in all planes. No obvious deformity or spasm is identified. The occipital nerves are not tender. Upper extremities:  Demonstrate a full free range of motion of the shoulders, elbows, wrists and hands. No gross asymmetry. Motor function is 5/5, sensory intact, and DTR's are 1-2+ bilaterally. Sensory exam is normal.  Pulses are 1+ bilaterally. Bud's reflexes are absent bilaterally. Shoulders: No evidence of any winging or atrophy. Impingement sign is mildly positive bilaterally. Apprehension sign is negative bilaterally. Elbows: show no evidence of any asymmetry. There is no evidence of any effusion. Range of motion is full with no varus valgus laxity. Wrists and hands: show no evidence of any swelling or asymmetry. Digits maintain a full range of motion. There is no clubbing or cyanosis. Lower extremities: Standing limb alignment is normal. Gait is within normal limits without Trendelenburg sign. Hips: show negative logroll bilaterally. Trochanters are nontender. Knees: demonstrate normal alignment. There is no evidence of any effusion. Range of motion is full. Lachman sign negative anterior posterior stress testing Gerhard sign negative bilaterally. No sign of laxity to varus valgus stress testing. The patellofemoral joint tracks normally without pain.     Ankles and feet show full range of motion with no evidence of hind, mid or forefoot Spine  11/3/2017

## 2017-11-07 ENCOUNTER — TELEPHONE (OUTPATIENT)
Dept: PULMONOLOGY | Age: 67
End: 2017-11-07

## 2017-11-07 ENCOUNTER — OFFICE VISIT (OUTPATIENT)
Dept: PULMONOLOGY | Age: 67
End: 2017-11-07

## 2017-11-07 VITALS
HEART RATE: 83 BPM | WEIGHT: 148 LBS | SYSTOLIC BLOOD PRESSURE: 123 MMHG | BODY MASS INDEX: 28.9 KG/M2 | DIASTOLIC BLOOD PRESSURE: 71 MMHG

## 2017-11-07 DIAGNOSIS — J44.9 COPD, MODERATE (HCC): ICD-10-CM

## 2017-11-07 DIAGNOSIS — C34.11 MALIGNANT NEOPLASM OF UPPER LOBE OF RIGHT LUNG (HCC): Primary | ICD-10-CM

## 2017-11-07 PROCEDURE — 1036F TOBACCO NON-USER: CPT | Performed by: INTERNAL MEDICINE

## 2017-11-07 PROCEDURE — 99213 OFFICE O/P EST LOW 20 MIN: CPT | Performed by: INTERNAL MEDICINE

## 2017-11-07 PROCEDURE — G8427 DOCREV CUR MEDS BY ELIG CLIN: HCPCS | Performed by: INTERNAL MEDICINE

## 2017-11-07 PROCEDURE — 3023F SPIROM DOC REV: CPT | Performed by: INTERNAL MEDICINE

## 2017-11-07 PROCEDURE — G8484 FLU IMMUNIZE NO ADMIN: HCPCS | Performed by: INTERNAL MEDICINE

## 2017-11-07 PROCEDURE — 1123F ACP DISCUSS/DSCN MKR DOCD: CPT | Performed by: INTERNAL MEDICINE

## 2017-11-07 PROCEDURE — G8417 CALC BMI ABV UP PARAM F/U: HCPCS | Performed by: INTERNAL MEDICINE

## 2017-11-07 PROCEDURE — 4040F PNEUMOC VAC/ADMIN/RCVD: CPT | Performed by: INTERNAL MEDICINE

## 2017-11-07 PROCEDURE — G8926 SPIRO NO PERF OR DOC: HCPCS | Performed by: INTERNAL MEDICINE

## 2017-11-07 PROCEDURE — G8399 PT W/DXA RESULTS DOCUMENT: HCPCS | Performed by: INTERNAL MEDICINE

## 2017-11-07 PROCEDURE — 3017F COLORECTAL CA SCREEN DOC REV: CPT | Performed by: INTERNAL MEDICINE

## 2017-11-07 PROCEDURE — 1090F PRES/ABSN URINE INCON ASSESS: CPT | Performed by: INTERNAL MEDICINE

## 2017-11-07 PROCEDURE — 3014F SCREEN MAMMO DOC REV: CPT | Performed by: INTERNAL MEDICINE

## 2017-11-07 RX ORDER — ALBUTEROL SULFATE 90 UG/1
2 AEROSOL, METERED RESPIRATORY (INHALATION) EVERY 6 HOURS PRN
Qty: 1 INHALER | Refills: 11 | Status: SHIPPED | OUTPATIENT
Start: 2017-11-07 | End: 2018-06-08 | Stop reason: SDUPTHER

## 2017-11-07 RX ORDER — OSELTAMIVIR PHOSPHATE 75 MG/1
75 CAPSULE ORAL 2 TIMES DAILY
Qty: 20 CAPSULE | Refills: 0 | Status: SHIPPED | OUTPATIENT
Start: 2017-11-07 | End: 2017-11-17

## 2017-11-07 NOTE — TELEPHONE ENCOUNTER
Lady Kearney with [facility] is wanting to verify the quantity on the pt Tamiflu. It was written for 10 days but she states it should only be for 5 days.  Please clarify    [place] [phone]

## 2017-11-07 NOTE — PROGRESS NOTES
[facility]  Progress Note  Margaret Briceno MD       [address] [facility]   YOB: 1950    Date of Visit:  11/7/2017    Assessment/Plan:  1. COPD, moderate ([facility]  PFT 5/16:  INTERPRETATION: Spirometry showed decreased FVC of 1.86 L, 77% predicted and  decreased   FEV-1 of 1.16 L, 60% predicted. FEV-1/FVC ratio was decreased at 63%. No  response to bronchodilators demonstrated on spirometry. Lung volumes showed  increased total lung capacity of 141% predicted and residual volume of 241%  predicted. Diffusion capacity showed   decreased DLCO of 66% predicted.       IMPRESSION: Moderate obstructive defect on spirometry with no response to  bronchodilators. Both air trapping and hyperinflation were present on lung  volumes and moderate decrease in diffusion capacity. In comparison to the  test that was done in February of 2015, [facility] has decreased by 11%, FEV-1 by  23% with no significant change in total lung capacity or DLCO. · Breo really helped  · Continue this, samples and written script given    2. Malignant neoplasm of upper lobe of right lung ([facility]  · S/p RULobectomy  · Most recent CT was 10/17. I reviewed these images  · No evidence of recurrence   · Folows with Onc    3. Benign essential hypertension  Stable    4. Immunizations  · Refuses pneumovax and flu shot  · I spoke with her again about this and cannot convince. · I gave her a script for Tamiflu      FOLLOW UP: 6 months      Chief Complaint   Patient presents with    6 Month Follow-Up       HPI  The patient presents with a chief complaint of exertional dyspnea. Stable dyspnea. Based on pulmonary function testing, she is known to have moderate COPD. Also had right upper lobectomy for non-small cell lung cancer. We gave her [place] Eth and that has helped. This actually helped her quite a bit. There is no complaint of chest pain, nausea or vomiting.     Review of Systems  As documented in HPI Physical Exam:  Well developed, well nourished  Alert and oriented  Sclera is clear  No cervical adenopathy  No JVD. Chest examination is wheezing. Cardiac examination reveals regular rate and rhythm without murmur, gallop or rub. The abdomen is soft, nontender and nondistended. There is no clubbing, cyanosis or edema of the extremities. There is no obvious skin rash. No focal neuro deficicts  Normal mood and affect    Allergies   Allergen Reactions    Codeine Anxiety     Prior to Visit Medications    Medication Sig Taking? Authorizing Provider   ALPRAZolam (XANAX) 0.5 MG tablet TAKE 1 TABLET BY MOUTH THREE TIMES DAILY AS NEEDED  Artem Smith MD   oxyCODONE (ROXICODONE) 5 MG immediate release tablet Take 1 tablet by mouth every 4-6 hours as needed for pain. Earliest Fill Date: 8/15/17  Lyndsey Meraz CNP   gabapentin (NEURONTIN) 100 MG capsule TAKE 1 CAPSULE BY MOUTH EVERY NIGHT AT BEDTIME  Violetta Carlos MD   meloxicam (MOBIC) 7.5 MG tablet TAKE 1 TABLET BY MOUTH DAILY  Reva Hernández MD   fluticasone-vilanterol (BREO ELLIPTA) 100-25 MCG/INH AEPB inhaler Inhale 1 puff into the lungs daily  Olivia Steen CNP   metoprolol succinate (TOPROL XL) 50 MG extended release tablet TAKE 1 TABLET BY MOUTH EVERY DAY  Artem Smith MD   atorvastatin (LIPITOR) 20 MG tablet TAKE 1 TABLET BY MOUTH EVERY DAY  Artem Smith MD   triamcinolone (KENALOG) 0.1 % cream Apply topically 2 times daily.   Artem Smith MD   metFORMIN (GLUCOPHAGE) 1000 MG tablet TAKE 1 TABLET BY MOUTH TWICE DAILY WITH MEALS  Artem Smith MD   buPROPion [facility] SR) 150 MG extended release tablet TAKE 1 TABLET BY MOUTH TWICE DAILY  Artem Smith MD   albuterol sulfate  (90 BASE) MCG/ACT inhaler Inhale 2 puffs into the lungs every 6 hours as needed for Wheezing  Cierra Aragon MD   magnesium oxide (MAG-OX) 400 (241.3 MG) MG TABS tablet Take 1 tablet by mouth 2 times daily  Violetta Carlos MD   glucose

## 2017-11-17 ENCOUNTER — OFFICE VISIT (OUTPATIENT)
Dept: ORTHOPEDIC SURGERY | Age: 67
End: 2017-11-17

## 2017-11-17 VITALS
HEART RATE: 88 BPM | DIASTOLIC BLOOD PRESSURE: 61 MMHG | TEMPERATURE: 97 F | WEIGHT: 148 LBS | HEIGHT: 60 IN | SYSTOLIC BLOOD PRESSURE: 113 MMHG | BODY MASS INDEX: 29.06 KG/M2

## 2017-11-17 DIAGNOSIS — M54.12 CERVICAL RADICULITIS: ICD-10-CM

## 2017-11-17 DIAGNOSIS — M54.2 CERVICALGIA: Primary | ICD-10-CM

## 2017-11-17 DIAGNOSIS — M75.80 ROTATOR CUFF TENDINITIS, UNSPECIFIED LATERALITY: ICD-10-CM

## 2017-11-17 PROCEDURE — 1123F ACP DISCUSS/DSCN MKR DOCD: CPT | Performed by: ORTHOPAEDIC SURGERY

## 2017-11-17 PROCEDURE — G8484 FLU IMMUNIZE NO ADMIN: HCPCS | Performed by: ORTHOPAEDIC SURGERY

## 2017-11-17 PROCEDURE — G8427 DOCREV CUR MEDS BY ELIG CLIN: HCPCS | Performed by: ORTHOPAEDIC SURGERY

## 2017-11-17 PROCEDURE — 1036F TOBACCO NON-USER: CPT | Performed by: ORTHOPAEDIC SURGERY

## 2017-11-17 PROCEDURE — G8417 CALC BMI ABV UP PARAM F/U: HCPCS | Performed by: ORTHOPAEDIC SURGERY

## 2017-11-17 PROCEDURE — 3014F SCREEN MAMMO DOC REV: CPT | Performed by: ORTHOPAEDIC SURGERY

## 2017-11-17 PROCEDURE — 4040F PNEUMOC VAC/ADMIN/RCVD: CPT | Performed by: ORTHOPAEDIC SURGERY

## 2017-11-17 PROCEDURE — 99213 OFFICE O/P EST LOW 20 MIN: CPT | Performed by: ORTHOPAEDIC SURGERY

## 2017-11-17 PROCEDURE — G8399 PT W/DXA RESULTS DOCUMENT: HCPCS | Performed by: ORTHOPAEDIC SURGERY

## 2017-11-17 PROCEDURE — 3017F COLORECTAL CA SCREEN DOC REV: CPT | Performed by: ORTHOPAEDIC SURGERY

## 2017-11-17 PROCEDURE — 1090F PRES/ABSN URINE INCON ASSESS: CPT | Performed by: ORTHOPAEDIC SURGERY

## 2017-12-13 ENCOUNTER — OFFICE VISIT (OUTPATIENT)
Dept: ORTHOPEDIC SURGERY | Age: 67
End: 2017-12-13

## 2017-12-13 VITALS — WEIGHT: 148 LBS | BODY MASS INDEX: 29.06 KG/M2 | HEIGHT: 60 IN | TEMPERATURE: 95.7 F

## 2017-12-13 DIAGNOSIS — M75.80 ROTATOR CUFF TENDINITIS, UNSPECIFIED LATERALITY: ICD-10-CM

## 2017-12-13 DIAGNOSIS — M54.12 CERVICAL RADICULITIS: ICD-10-CM

## 2017-12-13 DIAGNOSIS — M54.2 CERVICALGIA: Primary | ICD-10-CM

## 2017-12-13 PROCEDURE — 1090F PRES/ABSN URINE INCON ASSESS: CPT | Performed by: ORTHOPAEDIC SURGERY

## 2017-12-13 PROCEDURE — 4040F PNEUMOC VAC/ADMIN/RCVD: CPT | Performed by: ORTHOPAEDIC SURGERY

## 2017-12-13 PROCEDURE — 99213 OFFICE O/P EST LOW 20 MIN: CPT | Performed by: ORTHOPAEDIC SURGERY

## 2017-12-13 PROCEDURE — G8427 DOCREV CUR MEDS BY ELIG CLIN: HCPCS | Performed by: ORTHOPAEDIC SURGERY

## 2017-12-13 PROCEDURE — G8484 FLU IMMUNIZE NO ADMIN: HCPCS | Performed by: ORTHOPAEDIC SURGERY

## 2017-12-13 PROCEDURE — 3014F SCREEN MAMMO DOC REV: CPT | Performed by: ORTHOPAEDIC SURGERY

## 2017-12-13 PROCEDURE — G8417 CALC BMI ABV UP PARAM F/U: HCPCS | Performed by: ORTHOPAEDIC SURGERY

## 2017-12-13 PROCEDURE — G8399 PT W/DXA RESULTS DOCUMENT: HCPCS | Performed by: ORTHOPAEDIC SURGERY

## 2017-12-13 PROCEDURE — 1123F ACP DISCUSS/DSCN MKR DOCD: CPT | Performed by: ORTHOPAEDIC SURGERY

## 2017-12-13 PROCEDURE — 3017F COLORECTAL CA SCREEN DOC REV: CPT | Performed by: ORTHOPAEDIC SURGERY

## 2017-12-13 PROCEDURE — 1036F TOBACCO NON-USER: CPT | Performed by: ORTHOPAEDIC SURGERY

## 2017-12-19 ENCOUNTER — OFFICE VISIT (OUTPATIENT)
Dept: ORTHOPEDIC SURGERY | Age: 67
End: 2017-12-19

## 2017-12-19 VITALS
SYSTOLIC BLOOD PRESSURE: 127 MMHG | DIASTOLIC BLOOD PRESSURE: 79 MMHG | HEIGHT: 60 IN | BODY MASS INDEX: 29.06 KG/M2 | HEART RATE: 87 BPM | WEIGHT: 148 LBS

## 2017-12-19 DIAGNOSIS — M75.82 ROTATOR CUFF TENDINITIS, LEFT: ICD-10-CM

## 2017-12-19 DIAGNOSIS — M75.81 ROTATOR CUFF TENDINITIS, RIGHT: Primary | ICD-10-CM

## 2017-12-19 DIAGNOSIS — G62.9 PERIPHERAL POLYNEUROPATHY: ICD-10-CM

## 2017-12-19 PROCEDURE — G8427 DOCREV CUR MEDS BY ELIG CLIN: HCPCS | Performed by: ORTHOPAEDIC SURGERY

## 2017-12-19 PROCEDURE — 73030 X-RAY EXAM OF SHOULDER: CPT | Performed by: ORTHOPAEDIC SURGERY

## 2017-12-19 PROCEDURE — 3014F SCREEN MAMMO DOC REV: CPT | Performed by: ORTHOPAEDIC SURGERY

## 2017-12-19 PROCEDURE — G8484 FLU IMMUNIZE NO ADMIN: HCPCS | Performed by: ORTHOPAEDIC SURGERY

## 2017-12-19 PROCEDURE — G8417 CALC BMI ABV UP PARAM F/U: HCPCS | Performed by: ORTHOPAEDIC SURGERY

## 2017-12-19 PROCEDURE — 1036F TOBACCO NON-USER: CPT | Performed by: ORTHOPAEDIC SURGERY

## 2017-12-19 PROCEDURE — 4040F PNEUMOC VAC/ADMIN/RCVD: CPT | Performed by: ORTHOPAEDIC SURGERY

## 2017-12-19 PROCEDURE — 1123F ACP DISCUSS/DSCN MKR DOCD: CPT | Performed by: ORTHOPAEDIC SURGERY

## 2017-12-19 PROCEDURE — 1090F PRES/ABSN URINE INCON ASSESS: CPT | Performed by: ORTHOPAEDIC SURGERY

## 2017-12-19 PROCEDURE — 99214 OFFICE O/P EST MOD 30 MIN: CPT | Performed by: ORTHOPAEDIC SURGERY

## 2017-12-19 PROCEDURE — 3017F COLORECTAL CA SCREEN DOC REV: CPT | Performed by: ORTHOPAEDIC SURGERY

## 2017-12-19 PROCEDURE — G8399 PT W/DXA RESULTS DOCUMENT: HCPCS | Performed by: ORTHOPAEDIC SURGERY

## 2017-12-19 NOTE — PROGRESS NOTES
ORTHOPAEDIC CONSULTATION NOTE    Chief Complaint   Patient presents with    Arm Pain     Bilateral radiating arm pain and numbness, x2 mos. No known injury. HPI  75 y.o. female RHD seen in consultation at the request of Dr Magali Rodgers for evaluation of bilateral shoulder pain. Dr Reina Collins evaluated her for cervicalgia and cuff tendinitis, and gave her bilateral subacromial injections on 11/3/2017  Patient reports complete relief of arm pain for 3 weeks, pain has returned  She describes intermittent bilateral hand numbness/tingling  She is DM, on metformin  She has history of lung ca, s/p RUL lobectomy, and chemotherapy, finished on March 12, 2016  She no longer smokes, she has COPD    She state right and left shoulder/arm are equally painful  Pain radiates down the arms  Worse with overhead activity  + nite pain    She takes 4-6 pills of oxycodone 5mg daily from Dr Zara Suh    I have reviewed and discussed the below pain assessment findings with the patient. Pain Assessment  Location of Pain: Arm  Location Modifiers: Right, Left  Severity of Pain: 9  Quality of Pain: Aching  Duration of Pain: Persistent  Frequency of Pain: Constant  Date Pain First Started:  (x2 mos)  Aggravating Factors: Other (Comment) (Sleeping)  Limiting Behavior: Yes  Relieving Factors:  Other (Comment) (pain meds)  Result of Injury: No  Work-Related Injury: No  Are there other pain locations you wish to document?: No    ROS  Constitutional  denies fevers, weight loss  Cardiovascular  denies chest pain, palpitations, peripheral edema, blood clots  Respiratory  denies SOB, cough  Gastrointestinal  denies abdominal pain, nausea, vomiting  Genitourinary  denies dysuria, discharge  Musculoskeletal  per HPI  Integumentary  denies rash, sores  Neurologic  denies numbness, tingling, paresthesias  Hematologic  denies abnormal bleeding, blood clots  Allergic/Immunologic  denies metal allergies, recurrent infections    Allergies normal mood & affect, oriented to place, person, and situation  Cardiovascular  RRR, radial pulse 2+  Respiratory  respirations even and unlabored  HEENT - normocephalic, atraumatic  Skin  no rashes, wounds, or lesions seen  Neck/Spine - mildly decreased cervical ROM, no TTP of spinous processes, no TTP of paraspinal musculature,  negative Spurling's  Neurological - SILT M/U/R/A nerve distributions; AIN/PIN/IO intact  Bilateral shoulder:   No obvious deformity/swelling/ecchymosis   No atrophy seen, of the infraspinatus fossa, of the supraspinatus fossa and deltoid    mild TTP over bicipital groove    Range of Motion: Forward flexion:  170    Abduction:  160    External rotation with arm at side:  60    Internal rotation:  T12   Strength:    Abduction:  5-/5     External rotation:  5/5     Internal rotation:  5/5    Special tests:    negative lift-off sign    negative belly-press test    positive Hawkin's test    Imaging:  Images were personally reviewed by myself and discussed with the patient  Bilateral shoulder 4 views performed today in clinic   - glenohumeral articulation is mildly narrowed with small osteophytes seen, there are no loose bodies appreciated. There is no evidence of a high-riding humeral head. On axillary view, the humeral head is well-centered within the glenoid. The acromioclavicular joint demonstrates moderate degenerative changes. The tuberosities are normal in appearance. Calcific tendonitis is absent. There is a port overlying the left chest/shoulder. Assessment & Plan:  75 y.o. female who presents with   1. Rotator cuff tendinitis, right  XR SHOULDER LEFT (MIN 2 VIEWS)    XR SHOULDER RIGHT (MIN 2 VIEWS)   2. Rotator cuff tendinitis, left     3. Peripheral polyneuropathy (HCC)       Symptoms likely multifactorial, including neuropathy (DM?   Chemo?)  Cuff inflamed  Preserved shoulder motion and strength on my exam today  She got great relief with Dr Casimiro serrano, however, relief only for 3 weeks  Did not do therapy    Too soon for another injection  Formal PT referral for cuff strengthening, periscapulars, modalities    FU 6-8 weeks    No orders of the defined types were placed in this encounter.     Gina Crowder

## 2017-12-21 ENCOUNTER — TELEPHONE (OUTPATIENT)
Dept: PHARMACY | Facility: CLINIC | Age: 67
End: 2017-12-21

## 2017-12-22 ENCOUNTER — OFFICE VISIT (OUTPATIENT)
Dept: INTERNAL MEDICINE CLINIC | Age: 67
End: 2017-12-22

## 2017-12-22 VITALS
BODY MASS INDEX: 28.47 KG/M2 | HEIGHT: 60 IN | DIASTOLIC BLOOD PRESSURE: 72 MMHG | HEART RATE: 96 BPM | WEIGHT: 145 LBS | SYSTOLIC BLOOD PRESSURE: 120 MMHG

## 2017-12-22 DIAGNOSIS — E05.90 HYPERTHYROIDISM: ICD-10-CM

## 2017-12-22 DIAGNOSIS — M79.10 MYALGIA: ICD-10-CM

## 2017-12-22 DIAGNOSIS — I10 BENIGN ESSENTIAL HYPERTENSION: Primary | ICD-10-CM

## 2017-12-22 DIAGNOSIS — M54.12 CERVICAL RADICULITIS: ICD-10-CM

## 2017-12-22 PROBLEM — M79.672 LEFT FOOT PAIN: Chronic | Status: RESOLVED | Noted: 2017-02-22 | Resolved: 2017-12-22

## 2017-12-22 PROBLEM — M54.2 CERVICALGIA: Status: RESOLVED | Noted: 2017-11-03 | Resolved: 2017-12-22

## 2017-12-22 PROBLEM — M16.11 PRIMARY OSTEOARTHRITIS OF RIGHT HIP: Status: RESOLVED | Noted: 2017-03-08 | Resolved: 2017-12-22

## 2017-12-22 PROBLEM — H93.19 TINNITUS: Status: RESOLVED | Noted: 2017-02-23 | Resolved: 2017-12-22

## 2017-12-22 PROBLEM — H90.5 HEARING DISORDER, SENSORINEURAL: Status: RESOLVED | Noted: 2017-02-23 | Resolved: 2017-12-22

## 2017-12-22 LAB
T4 FREE: 1.8 NG/DL (ref 0.9–1.8)
TSH SERPL DL<=0.05 MIU/L-ACNC: <0.01 UIU/ML (ref 0.27–4.2)

## 2017-12-22 PROCEDURE — G8427 DOCREV CUR MEDS BY ELIG CLIN: HCPCS | Performed by: INTERNAL MEDICINE

## 2017-12-22 PROCEDURE — 1123F ACP DISCUSS/DSCN MKR DOCD: CPT | Performed by: INTERNAL MEDICINE

## 2017-12-22 PROCEDURE — G8399 PT W/DXA RESULTS DOCUMENT: HCPCS | Performed by: INTERNAL MEDICINE

## 2017-12-22 PROCEDURE — 4040F PNEUMOC VAC/ADMIN/RCVD: CPT | Performed by: INTERNAL MEDICINE

## 2017-12-22 PROCEDURE — G8484 FLU IMMUNIZE NO ADMIN: HCPCS | Performed by: INTERNAL MEDICINE

## 2017-12-22 PROCEDURE — 99214 OFFICE O/P EST MOD 30 MIN: CPT | Performed by: INTERNAL MEDICINE

## 2017-12-22 PROCEDURE — 3014F SCREEN MAMMO DOC REV: CPT | Performed by: INTERNAL MEDICINE

## 2017-12-22 PROCEDURE — 1036F TOBACCO NON-USER: CPT | Performed by: INTERNAL MEDICINE

## 2017-12-22 PROCEDURE — G8417 CALC BMI ABV UP PARAM F/U: HCPCS | Performed by: INTERNAL MEDICINE

## 2017-12-22 PROCEDURE — 3017F COLORECTAL CA SCREEN DOC REV: CPT | Performed by: INTERNAL MEDICINE

## 2017-12-22 PROCEDURE — 3045F PR MOST RECENT HEMOGLOBIN A1C LEVEL 7.0-9.0%: CPT | Performed by: INTERNAL MEDICINE

## 2017-12-22 PROCEDURE — 1090F PRES/ABSN URINE INCON ASSESS: CPT | Performed by: INTERNAL MEDICINE

## 2017-12-22 RX ORDER — PREDNISONE 20 MG/1
20 TABLET ORAL DAILY
Qty: 10 TABLET | Refills: 0 | Status: SHIPPED | OUTPATIENT
Start: 2017-12-22 | End: 2018-01-01

## 2017-12-26 RX ORDER — PROPYLTHIOURACIL 50 MG/1
50 TABLET ORAL 3 TIMES DAILY
Qty: 90 TABLET | Refills: 0 | Status: SHIPPED | OUTPATIENT
Start: 2017-12-26 | End: 2018-01-22 | Stop reason: SDUPTHER

## 2018-01-02 NOTE — TELEPHONE ENCOUNTER
Did not hear from patient prior to PCP appointment. Next appointment is 1/22/18. Will attempt again for that appointment.     Vannessa Carter, PharmD  [facility] Pharmacist  [phone] or [phone] (Option 7)      For Pharmacy Admin Tracking Only    PHSO: Yes  Time Spent (min): 5

## 2018-01-18 ENCOUNTER — TELEPHONE (OUTPATIENT)
Dept: PHARMACY | Facility: CLINIC | Age: 68
End: 2018-01-18

## 2018-01-22 ENCOUNTER — OFFICE VISIT (OUTPATIENT)
Dept: INTERNAL MEDICINE CLINIC | Age: 68
End: 2018-01-22

## 2018-01-22 VITALS
SYSTOLIC BLOOD PRESSURE: 120 MMHG | DIASTOLIC BLOOD PRESSURE: 68 MMHG | HEART RATE: 64 BPM | BODY MASS INDEX: 28.27 KG/M2 | HEIGHT: 60 IN | WEIGHT: 144 LBS

## 2018-01-22 DIAGNOSIS — M54.12 CERVICAL RADICULITIS: Primary | ICD-10-CM

## 2018-01-22 PROCEDURE — 1036F TOBACCO NON-USER: CPT | Performed by: INTERNAL MEDICINE

## 2018-01-22 PROCEDURE — G8417 CALC BMI ABV UP PARAM F/U: HCPCS | Performed by: INTERNAL MEDICINE

## 2018-01-22 PROCEDURE — 3014F SCREEN MAMMO DOC REV: CPT | Performed by: INTERNAL MEDICINE

## 2018-01-22 PROCEDURE — 4040F PNEUMOC VAC/ADMIN/RCVD: CPT | Performed by: INTERNAL MEDICINE

## 2018-01-22 PROCEDURE — 1123F ACP DISCUSS/DSCN MKR DOCD: CPT | Performed by: INTERNAL MEDICINE

## 2018-01-22 PROCEDURE — 99213 OFFICE O/P EST LOW 20 MIN: CPT | Performed by: INTERNAL MEDICINE

## 2018-01-22 PROCEDURE — G8484 FLU IMMUNIZE NO ADMIN: HCPCS | Performed by: INTERNAL MEDICINE

## 2018-01-22 PROCEDURE — 3017F COLORECTAL CA SCREEN DOC REV: CPT | Performed by: INTERNAL MEDICINE

## 2018-01-22 PROCEDURE — 1090F PRES/ABSN URINE INCON ASSESS: CPT | Performed by: INTERNAL MEDICINE

## 2018-01-22 PROCEDURE — G8427 DOCREV CUR MEDS BY ELIG CLIN: HCPCS | Performed by: INTERNAL MEDICINE

## 2018-01-22 PROCEDURE — G8399 PT W/DXA RESULTS DOCUMENT: HCPCS | Performed by: INTERNAL MEDICINE

## 2018-01-22 RX ORDER — PREDNISONE 10 MG/1
TABLET ORAL
Qty: 20 TABLET | Refills: 0 | Status: SHIPPED | OUTPATIENT
Start: 2018-01-22 | End: 2018-06-08 | Stop reason: ALTCHOICE

## 2018-01-26 ENCOUNTER — TELEPHONE (OUTPATIENT)
Dept: ORTHOPEDIC SURGERY | Age: 68
End: 2018-01-26

## 2018-02-26 ENCOUNTER — OFFICE VISIT (OUTPATIENT)
Dept: INTERNAL MEDICINE CLINIC | Age: 68
End: 2018-02-26

## 2018-02-26 VITALS
HEART RATE: 82 BPM | SYSTOLIC BLOOD PRESSURE: 100 MMHG | HEIGHT: 60 IN | BODY MASS INDEX: 29.02 KG/M2 | WEIGHT: 147.8 LBS | DIASTOLIC BLOOD PRESSURE: 60 MMHG

## 2018-02-26 DIAGNOSIS — J44.9 COPD, MODERATE (HCC): ICD-10-CM

## 2018-02-26 DIAGNOSIS — F33.41 RECURRENT MAJOR DEPRESSIVE DISORDER, IN PARTIAL REMISSION (HCC): Primary | Chronic | ICD-10-CM

## 2018-02-26 DIAGNOSIS — C34.11 MALIGNANT NEOPLASM OF UPPER LOBE OF RIGHT LUNG (HCC): ICD-10-CM

## 2018-02-26 DIAGNOSIS — G56.01 CARPAL TUNNEL SYNDROME OF RIGHT WRIST: ICD-10-CM

## 2018-02-26 DIAGNOSIS — M54.12 CERVICAL RADICULITIS: ICD-10-CM

## 2018-02-26 PROCEDURE — G8484 FLU IMMUNIZE NO ADMIN: HCPCS | Performed by: INTERNAL MEDICINE

## 2018-02-26 PROCEDURE — 4040F PNEUMOC VAC/ADMIN/RCVD: CPT | Performed by: INTERNAL MEDICINE

## 2018-02-26 PROCEDURE — 3017F COLORECTAL CA SCREEN DOC REV: CPT | Performed by: INTERNAL MEDICINE

## 2018-02-26 PROCEDURE — 99214 OFFICE O/P EST MOD 30 MIN: CPT | Performed by: INTERNAL MEDICINE

## 2018-02-26 PROCEDURE — G8427 DOCREV CUR MEDS BY ELIG CLIN: HCPCS | Performed by: INTERNAL MEDICINE

## 2018-02-26 PROCEDURE — 1036F TOBACCO NON-USER: CPT | Performed by: INTERNAL MEDICINE

## 2018-02-26 PROCEDURE — G8926 SPIRO NO PERF OR DOC: HCPCS | Performed by: INTERNAL MEDICINE

## 2018-02-26 PROCEDURE — 3023F SPIROM DOC REV: CPT | Performed by: INTERNAL MEDICINE

## 2018-02-26 PROCEDURE — 3014F SCREEN MAMMO DOC REV: CPT | Performed by: INTERNAL MEDICINE

## 2018-02-26 PROCEDURE — 1123F ACP DISCUSS/DSCN MKR DOCD: CPT | Performed by: INTERNAL MEDICINE

## 2018-02-26 PROCEDURE — G8417 CALC BMI ABV UP PARAM F/U: HCPCS | Performed by: INTERNAL MEDICINE

## 2018-02-26 PROCEDURE — 3046F HEMOGLOBIN A1C LEVEL >9.0%: CPT | Performed by: INTERNAL MEDICINE

## 2018-02-26 PROCEDURE — G8399 PT W/DXA RESULTS DOCUMENT: HCPCS | Performed by: INTERNAL MEDICINE

## 2018-02-26 PROCEDURE — 1090F PRES/ABSN URINE INCON ASSESS: CPT | Performed by: INTERNAL MEDICINE

## 2018-02-26 RX ORDER — PREDNISONE 10 MG/1
10 TABLET ORAL DAILY
Qty: 30 TABLET | Refills: 1 | Status: SHIPPED | OUTPATIENT
Start: 2018-02-26 | End: 2018-03-08

## 2018-02-26 NOTE — PROGRESS NOTES
Subjective:      Patient ID: Desiree Stanley is a 75 y.o. female. HPI  Patient comes to the office today for follow-up of her right upper extremity radiculopathy. Patient was seen by neurosurgery and an EMG was performed. She reports that she had a carpal tunnel syndrome as the primary cause of her symptoms and that her radiculopathy was not present. Patient reports that when she was taking prednisone, her symptoms were much better. She feels like her fingers are going to \"fall off\". 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 She has been following up with her oncologist about her lung cancer. Her COPD is fairly stable. She continues to smoke despite warnings not to. Smoking cessation has been emphasized. Review of Systems    Objective:   Physical Exam   Constitutional: She is oriented to person, place, and time. She appears well-developed and well-nourished. No distress. HENT:   Head: Normocephalic and atraumatic. Pulmonary/Chest: Effort normal. No respiratory distress. Neurological: She is alert and oriented to person, place, and time. No cranial nerve deficit. Skin: Skin is warm and dry. She is not diaphoretic. Psychiatric: She has a normal mood and affect. Her behavior is normal. Judgment and thought content normal.   Vitals reviewed. Current Outpatient Prescriptions:     predniSONE (DELTASONE) 10 MG tablet, Take 1 tablet by mouth daily for 10 days, Disp: 30 tablet, Rfl: 1    metFORMIN (GLUCOPHAGE) 1000 MG tablet, TAKE 1 TABLET BY MOUTH TWICE DAILY WITH MEALS, Disp: 180 tablet, Rfl: 3    predniSONE (DELTASONE) 10 MG tablet, Take 2 tablets daily for 5 days, then 1 tablet daily for 5 days, then 1/2 tablet for 5 days. , Disp: 20 tablet, Rfl: 0    propylthiouracil (PTU) 50 MG tablet, TAKE 1 TABLET BY MOUTH THREE TIMES DAILY, Disp: 90 tablet, Rfl: 3    buPROPion (WELLBUTRIN SR) 150 MG extended release tablet, TAKE 1 TABLET BY

## 2018-03-14 ENCOUNTER — OFFICE VISIT (OUTPATIENT)
Dept: ORTHOPEDIC SURGERY | Age: 68
End: 2018-03-14

## 2018-03-14 VITALS
SYSTOLIC BLOOD PRESSURE: 145 MMHG | WEIGHT: 144 LBS | BODY MASS INDEX: 28.27 KG/M2 | HEIGHT: 60 IN | DIASTOLIC BLOOD PRESSURE: 74 MMHG | HEART RATE: 80 BPM

## 2018-03-14 DIAGNOSIS — G56.01 CARPAL TUNNEL SYNDROME OF RIGHT WRIST: Primary | ICD-10-CM

## 2018-03-14 PROCEDURE — 20526 THER INJECTION CARP TUNNEL: CPT | Performed by: ORTHOPAEDIC SURGERY

## 2018-03-14 PROCEDURE — G8417 CALC BMI ABV UP PARAM F/U: HCPCS | Performed by: ORTHOPAEDIC SURGERY

## 2018-03-14 PROCEDURE — 3014F SCREEN MAMMO DOC REV: CPT | Performed by: ORTHOPAEDIC SURGERY

## 2018-03-14 PROCEDURE — 1090F PRES/ABSN URINE INCON ASSESS: CPT | Performed by: ORTHOPAEDIC SURGERY

## 2018-03-14 PROCEDURE — 99214 OFFICE O/P EST MOD 30 MIN: CPT | Performed by: ORTHOPAEDIC SURGERY

## 2018-03-14 PROCEDURE — G8427 DOCREV CUR MEDS BY ELIG CLIN: HCPCS | Performed by: ORTHOPAEDIC SURGERY

## 2018-03-14 PROCEDURE — G8484 FLU IMMUNIZE NO ADMIN: HCPCS | Performed by: ORTHOPAEDIC SURGERY

## 2018-03-14 PROCEDURE — 4040F PNEUMOC VAC/ADMIN/RCVD: CPT | Performed by: ORTHOPAEDIC SURGERY

## 2018-03-14 PROCEDURE — 3017F COLORECTAL CA SCREEN DOC REV: CPT | Performed by: ORTHOPAEDIC SURGERY

## 2018-03-14 NOTE — Clinical Note
Dear  Reinaldo Florez MD,  Thank you very much for your referral or Ms. Cristobal Michele to me for evaluation and treatment of her Hand & Wrist condition. I appreciate your confidence in me and thank you for allowing me the opportunity to care for your patients. If I can be of any further assistance to you on this or any other patient, please do not hesitate to contact me. Sincerely,  Juline Peabody.  Willard Vo MD

## 2018-03-14 NOTE — PROGRESS NOTES
Ms. Luis Felipe Mariee is a 75 y.o. right handed woman  who is seen today in Hand Surgical Consultation at the request of Ming Lee MD.    She presents today regarding right sided symptoms which have been present for approximately 2 months. A history of antecedent trauma or injury is Absent. She reports symptoms to include moderate numbness & tingling in the Index Finger, Middle Finger and Ring Finger. Hand symptoms do Frequently awaken her from sleep. She reports moderate pain located in the palmar right wrist. Symptoms are worsening over time. Previous treatment has included wrist splints used for a few weeks, which has been somewhat effective. She does not claim relation of her symptoms to her required work activities. She has undergone electrodiagnostic testing. The patient's , past medical history, medications, allergies,  family history, social history, and review of systems have been reviewed, and dated and are recorded in the chart. Physical Exam:  Ms. Gifty Marc's most recent vitals:  Vitals  BP: (!) 145/74  Pulse: 80  Height: 5' (152.4 cm)  Weight: 144 lb (65.3 kg)    She is well nourished, oriented to person, place & time. She demonstrates appropriate mood and affect as well as normal gait and station. Skin: Skin color, texture, turgor normal. No rashes or lesions bilaterally. Digital range of motion is mildly limited by distal osteoarthritis  bilaterally  Wrist range of motion is equal bilateral   There is no evidence of gross joint instability bilaterally. Sensation is subjectively tingling in the Median Innervated Digits on the Right, normal on the Left and objectively present in the same distribution bilaterally. Vascular examination reveals normal and good capillary refill bilaterally  Swelling is mild in the distal volar forearm on the Right, normal on the Left  Muscular strength is clinically appropriate bilaterally.   Examination for Carpal Tunnel Syndrome shows

## 2018-03-27 DIAGNOSIS — I10 BENIGN ESSENTIAL HYPERTENSION: Primary | ICD-10-CM

## 2018-03-28 ENCOUNTER — HOSPITAL ENCOUNTER (OUTPATIENT)
Dept: MRI IMAGING | Age: 68
Discharge: HOME | End: 2018-03-28
Attending: INTERNAL MEDICINE | Admitting: INTERNAL MEDICINE

## 2018-03-28 DIAGNOSIS — C34.11 MALIGNANT NEOPLASM OF UPPER LOBE, RIGHT BRONCHUS OR LUNG (HCC): ICD-10-CM

## 2018-03-28 DIAGNOSIS — C34.11 MALIGNANT NEOPLASM OF BRONCHUS OF RIGHT UPPER LOBE (HCC): ICD-10-CM

## 2018-03-28 LAB
A/G RATIO: 1.4 (ref 1.1–2.2)
ALBUMIN SERPL-MCNC: 4.4 G/DL (ref 3.4–5)
ALP BLD-CCNC: 99 U/L (ref 40–129)
ALT SERPL-CCNC: 23 U/L (ref 10–40)
ANION GAP SERPL CALCULATED.3IONS-SCNC: 11 MMOL/L (ref 3–16)
AST SERPL-CCNC: 17 U/L (ref 15–37)
BILIRUB SERPL-MCNC: 0.5 MG/DL (ref 0–1)
BUN BLDV-MCNC: 17 MG/DL (ref 7–20)
CALCIUM SERPL-MCNC: 9.8 MG/DL (ref 8.3–10.6)
CHLORIDE BLD-SCNC: 105 MMOL/L (ref 99–110)
CO2: 25 MMOL/L (ref 21–32)
CREAT SERPL-MCNC: 1 MG/DL (ref 0.6–1.2)
GFR AFRICAN AMERICAN: >60
GFR NON-AFRICAN AMERICAN: 55
GLOBULIN: 3.1 G/DL
GLUCOSE BLD-MCNC: 159 MG/DL (ref 70–99)
POTASSIUM SERPL-SCNC: 4.6 MMOL/L (ref 3.5–5.1)
SODIUM BLD-SCNC: 141 MMOL/L (ref 136–145)
TOTAL PROTEIN: 7.5 G/DL (ref 6.4–8.2)

## 2018-03-28 RX ORDER — SODIUM CHLORIDE 0.9 % (FLUSH) 0.9 %
10 SYRINGE (ML) INJECTION ONCE
Status: COMPLETED | OUTPATIENT
Start: 2018-03-28 | End: 2018-03-28

## 2018-03-28 RX ADMIN — Medication 10 ML: at 13:56

## 2018-03-30 ENCOUNTER — TELEPHONE (OUTPATIENT)
Dept: ORTHOPEDIC SURGERY | Age: 68
End: 2018-03-30

## 2018-04-25 ENCOUNTER — PRE-EVALUATION (OUTPATIENT)
Dept: ORTHOPEDIC SURGERY | Age: 68
End: 2018-04-25

## 2018-04-25 ENCOUNTER — OFFICE VISIT (OUTPATIENT)
Dept: ORTHOPEDIC SURGERY | Age: 68
End: 2018-04-25

## 2018-04-25 VITALS
BODY MASS INDEX: 28.27 KG/M2 | HEART RATE: 87 BPM | WEIGHT: 144 LBS | HEIGHT: 60 IN | RESPIRATION RATE: 16 BRPM | SYSTOLIC BLOOD PRESSURE: 118 MMHG | DIASTOLIC BLOOD PRESSURE: 70 MMHG

## 2018-04-25 DIAGNOSIS — G56.01 CARPAL TUNNEL SYNDROME OF RIGHT WRIST: ICD-10-CM

## 2018-04-25 PROCEDURE — 1123F ACP DISCUSS/DSCN MKR DOCD: CPT | Performed by: ORTHOPAEDIC SURGERY

## 2018-04-25 PROCEDURE — 1090F PRES/ABSN URINE INCON ASSESS: CPT | Performed by: ORTHOPAEDIC SURGERY

## 2018-04-25 PROCEDURE — G8399 PT W/DXA RESULTS DOCUMENT: HCPCS | Performed by: ORTHOPAEDIC SURGERY

## 2018-04-25 PROCEDURE — G8427 DOCREV CUR MEDS BY ELIG CLIN: HCPCS | Performed by: ORTHOPAEDIC SURGERY

## 2018-04-25 PROCEDURE — 4040F PNEUMOC VAC/ADMIN/RCVD: CPT | Performed by: ORTHOPAEDIC SURGERY

## 2018-04-25 PROCEDURE — 1036F TOBACCO NON-USER: CPT | Performed by: ORTHOPAEDIC SURGERY

## 2018-04-25 PROCEDURE — 99213 OFFICE O/P EST LOW 20 MIN: CPT | Performed by: ORTHOPAEDIC SURGERY

## 2018-04-25 PROCEDURE — 3017F COLORECTAL CA SCREEN DOC REV: CPT | Performed by: ORTHOPAEDIC SURGERY

## 2018-04-25 PROCEDURE — APPNB15 APP NON BILLABLE TIME 0-15 MINS: Performed by: PHYSICIAN ASSISTANT

## 2018-04-25 PROCEDURE — G8417 CALC BMI ABV UP PARAM F/U: HCPCS | Performed by: ORTHOPAEDIC SURGERY

## 2018-06-08 ENCOUNTER — OFFICE VISIT (OUTPATIENT)
Dept: INTERNAL MEDICINE CLINIC | Age: 68
End: 2018-06-08

## 2018-06-08 ENCOUNTER — OFFICE VISIT (OUTPATIENT)
Dept: PULMONOLOGY | Age: 68
End: 2018-06-08

## 2018-06-08 VITALS
DIASTOLIC BLOOD PRESSURE: 72 MMHG | WEIGHT: 146 LBS | HEIGHT: 60 IN | SYSTOLIC BLOOD PRESSURE: 130 MMHG | HEART RATE: 68 BPM | BODY MASS INDEX: 28.66 KG/M2

## 2018-06-08 VITALS
SYSTOLIC BLOOD PRESSURE: 110 MMHG | WEIGHT: 142 LBS | HEART RATE: 78 BPM | DIASTOLIC BLOOD PRESSURE: 62 MMHG | BODY MASS INDEX: 27.73 KG/M2

## 2018-06-08 DIAGNOSIS — E05.90 HYPERTHYROIDISM: ICD-10-CM

## 2018-06-08 DIAGNOSIS — Z12.31 ENCOUNTER FOR SCREENING MAMMOGRAM FOR BREAST CANCER: Primary | ICD-10-CM

## 2018-06-08 DIAGNOSIS — I10 BENIGN ESSENTIAL HYPERTENSION: ICD-10-CM

## 2018-06-08 DIAGNOSIS — M79.10 MYALGIA: ICD-10-CM

## 2018-06-08 DIAGNOSIS — C34.11 MALIGNANT NEOPLASM OF UPPER LOBE OF RIGHT LUNG (HCC): ICD-10-CM

## 2018-06-08 DIAGNOSIS — R07.89 CHEST WALL PAIN: ICD-10-CM

## 2018-06-08 DIAGNOSIS — F33.41 RECURRENT MAJOR DEPRESSIVE DISORDER, IN PARTIAL REMISSION (HCC): Chronic | ICD-10-CM

## 2018-06-08 DIAGNOSIS — J44.9 COPD, MODERATE (HCC): Primary | ICD-10-CM

## 2018-06-08 LAB
D DIMER: 253 NG/ML DDU (ref 0–229)
SEDIMENTATION RATE, ERYTHROCYTE: 36 MM/HR (ref 0–30)
TOTAL CK: 82 U/L (ref 26–192)

## 2018-06-08 PROCEDURE — 1036F TOBACCO NON-USER: CPT | Performed by: INTERNAL MEDICINE

## 2018-06-08 PROCEDURE — 2022F DILAT RTA XM EVC RTNOPTHY: CPT | Performed by: INTERNAL MEDICINE

## 2018-06-08 PROCEDURE — 3017F COLORECTAL CA SCREEN DOC REV: CPT | Performed by: INTERNAL MEDICINE

## 2018-06-08 PROCEDURE — 99214 OFFICE O/P EST MOD 30 MIN: CPT | Performed by: INTERNAL MEDICINE

## 2018-06-08 PROCEDURE — 3023F SPIROM DOC REV: CPT | Performed by: INTERNAL MEDICINE

## 2018-06-08 PROCEDURE — G8926 SPIRO NO PERF OR DOC: HCPCS | Performed by: INTERNAL MEDICINE

## 2018-06-08 PROCEDURE — G8427 DOCREV CUR MEDS BY ELIG CLIN: HCPCS | Performed by: INTERNAL MEDICINE

## 2018-06-08 PROCEDURE — G8399 PT W/DXA RESULTS DOCUMENT: HCPCS | Performed by: INTERNAL MEDICINE

## 2018-06-08 PROCEDURE — 3046F HEMOGLOBIN A1C LEVEL >9.0%: CPT | Performed by: INTERNAL MEDICINE

## 2018-06-08 PROCEDURE — 4040F PNEUMOC VAC/ADMIN/RCVD: CPT | Performed by: INTERNAL MEDICINE

## 2018-06-08 PROCEDURE — 1123F ACP DISCUSS/DSCN MKR DOCD: CPT | Performed by: INTERNAL MEDICINE

## 2018-06-08 PROCEDURE — G8417 CALC BMI ABV UP PARAM F/U: HCPCS | Performed by: INTERNAL MEDICINE

## 2018-06-08 PROCEDURE — 1090F PRES/ABSN URINE INCON ASSESS: CPT | Performed by: INTERNAL MEDICINE

## 2018-06-08 RX ORDER — ALPRAZOLAM 0.5 MG/1
TABLET ORAL
Refills: 2 | COMMUNITY
Start: 2018-05-31 | End: 2018-07-03 | Stop reason: SDUPTHER

## 2018-06-08 RX ORDER — FLUTICASONE FUROATE AND VILANTEROL 100; 25 UG/1; UG/1
1 POWDER RESPIRATORY (INHALATION) DAILY
Qty: 1 EACH | Refills: 6 | Status: SHIPPED | OUTPATIENT
Start: 2018-06-08 | End: 2019-10-30 | Stop reason: SDUPTHER

## 2018-06-08 RX ORDER — ALBUTEROL SULFATE 90 UG/1
2 AEROSOL, METERED RESPIRATORY (INHALATION) EVERY 6 HOURS PRN
Qty: 1 INHALER | Refills: 11 | Status: SHIPPED | OUTPATIENT
Start: 2018-06-08 | End: 2021-05-03 | Stop reason: SDUPTHER

## 2018-06-08 ASSESSMENT — PATIENT HEALTH QUESTIONNAIRE - PHQ9
SUM OF ALL RESPONSES TO PHQ9 QUESTIONS 1 & 2: 0
SUM OF ALL RESPONSES TO PHQ QUESTIONS 1-9: 0
1. LITTLE INTEREST OR PLEASURE IN DOING THINGS: 0
2. FEELING DOWN, DEPRESSED OR HOPELESS: 0

## 2018-06-09 LAB
ESTIMATED AVERAGE GLUCOSE: 205.9 MG/DL
HBA1C MFR BLD: 8.8 %

## 2018-07-11 ENCOUNTER — HOSPITAL ENCOUNTER (OUTPATIENT)
Dept: OTHER | Age: 68
Discharge: HOME | End: 2018-07-11
Attending: INTERNAL MEDICINE | Admitting: INTERNAL MEDICINE

## 2018-07-11 VITALS — BODY MASS INDEX: 27.88 KG/M2 | HEIGHT: 60 IN | WEIGHT: 142 LBS

## 2018-07-11 DIAGNOSIS — C34.11 CANCER OF BRONCHUS OF RIGHT UPPER LOBE (HCC): ICD-10-CM

## 2018-07-11 RX ORDER — FLUDEOXYGLUCOSE F 18 200 MCI/ML
18.78 INJECTION, SOLUTION INTRAVENOUS
Status: COMPLETED | OUTPATIENT
Start: 2018-07-11 | End: 2018-07-11

## 2018-07-11 RX ADMIN — FLUDEOXYGLUCOSE F 18 18.78 MILLICURIE: 200 INJECTION, SOLUTION INTRAVENOUS at 17:27

## 2018-08-17 ENCOUNTER — OFFICE VISIT (OUTPATIENT)
Dept: INTERNAL MEDICINE CLINIC | Age: 68
End: 2018-08-17

## 2018-08-17 VITALS
SYSTOLIC BLOOD PRESSURE: 136 MMHG | BODY MASS INDEX: 27.29 KG/M2 | WEIGHT: 139 LBS | HEIGHT: 60 IN | HEART RATE: 80 BPM | DIASTOLIC BLOOD PRESSURE: 82 MMHG

## 2018-08-17 DIAGNOSIS — E05.90 HYPERTHYROIDISM: ICD-10-CM

## 2018-08-17 DIAGNOSIS — F33.41 RECURRENT MAJOR DEPRESSIVE DISORDER, IN PARTIAL REMISSION (HCC): Chronic | ICD-10-CM

## 2018-08-17 DIAGNOSIS — Z12.31 ENCOUNTER FOR SCREENING MAMMOGRAM FOR BREAST CANCER: ICD-10-CM

## 2018-08-17 DIAGNOSIS — Z13.220 SCREENING FOR HYPERLIPIDEMIA: ICD-10-CM

## 2018-08-17 DIAGNOSIS — I10 BENIGN ESSENTIAL HYPERTENSION: Primary | ICD-10-CM

## 2018-08-17 LAB
CHOLESTEROL, TOTAL: 156 MG/DL (ref 0–199)
CREATININE URINE: 103.2 MG/DL (ref 28–259)
HDLC SERPL-MCNC: 51 MG/DL (ref 40–60)
LDL CHOLESTEROL CALCULATED: 68 MG/DL
MICROALBUMIN UR-MCNC: 1.9 MG/DL
MICROALBUMIN/CREAT UR-RTO: 18.4 MG/G (ref 0–30)
TRIGL SERPL-MCNC: 185 MG/DL (ref 0–150)
VLDLC SERPL CALC-MCNC: 37 MG/DL

## 2018-08-17 PROCEDURE — 3045F PR MOST RECENT HEMOGLOBIN A1C LEVEL 7.0-9.0%: CPT | Performed by: INTERNAL MEDICINE

## 2018-08-17 PROCEDURE — 1090F PRES/ABSN URINE INCON ASSESS: CPT | Performed by: INTERNAL MEDICINE

## 2018-08-17 PROCEDURE — 1101F PT FALLS ASSESS-DOCD LE1/YR: CPT | Performed by: INTERNAL MEDICINE

## 2018-08-17 PROCEDURE — 2022F DILAT RTA XM EVC RTNOPTHY: CPT | Performed by: INTERNAL MEDICINE

## 2018-08-17 PROCEDURE — 1123F ACP DISCUSS/DSCN MKR DOCD: CPT | Performed by: INTERNAL MEDICINE

## 2018-08-17 PROCEDURE — 99213 OFFICE O/P EST LOW 20 MIN: CPT | Performed by: INTERNAL MEDICINE

## 2018-08-17 PROCEDURE — 3017F COLORECTAL CA SCREEN DOC REV: CPT | Performed by: INTERNAL MEDICINE

## 2018-08-17 PROCEDURE — G8399 PT W/DXA RESULTS DOCUMENT: HCPCS | Performed by: INTERNAL MEDICINE

## 2018-08-17 PROCEDURE — 1036F TOBACCO NON-USER: CPT | Performed by: INTERNAL MEDICINE

## 2018-08-17 PROCEDURE — 4040F PNEUMOC VAC/ADMIN/RCVD: CPT | Performed by: INTERNAL MEDICINE

## 2018-08-17 PROCEDURE — G8417 CALC BMI ABV UP PARAM F/U: HCPCS | Performed by: INTERNAL MEDICINE

## 2018-08-17 PROCEDURE — G8427 DOCREV CUR MEDS BY ELIG CLIN: HCPCS | Performed by: INTERNAL MEDICINE

## 2018-08-17 ASSESSMENT — ENCOUNTER SYMPTOMS: SHORTNESS OF BREATH: 0

## 2018-08-17 NOTE — PROGRESS NOTES
diaphoretic. No erythema. Psychiatric: She has a normal mood and affect. Her behavior is normal. Judgment and thought content normal.   Nursing note and vitals reviewed. Current Outpatient Prescriptions:     buPROPion (WELLBUTRIN SR) 150 MG extended release tablet, TAKE 1 TABLET BY MOUTH TWICE DAILY, Disp: 180 tablet, Rfl: 1    propylthiouracil (PTU) 50 MG tablet, TAKE 1 TABLET BY MOUTH THREE TIMES DAILY, Disp: 270 tablet, Rfl: 1    ALPRAZolam (XANAX) 0.5 MG tablet, TAKE 1 TABLET BY MOUTH THREE TIMES DAILY AS NEEDED, Disp: 90 tablet, Rfl: 2    fluticasone-vilanterol (BREO ELLIPTA) 100-25 MCG/INH AEPB inhaler, Inhale 1 puff into the lungs daily, Disp: 1 each, Rfl: 6    albuterol sulfate  (90 Base) MCG/ACT inhaler, Inhale 2 puffs into the lungs every 6 hours as needed for Wheezing, Disp: 1 Inhaler, Rfl: 11    atorvastatin (LIPITOR) 20 MG tablet, TAKE 1 TABLET BY MOUTH EVERY DAY, Disp: 90 tablet, Rfl: 3    metFORMIN (GLUCOPHAGE) 1000 MG tablet, TAKE 1 TABLET BY MOUTH TWICE DAILY WITH MEALS, Disp: 180 tablet, Rfl: 3    oxyCODONE (ROXICODONE) 5 MG immediate release tablet, Take 1 tablet by mouth every 4-6 hours as needed for pain. Earliest Fill Date: 8/15/17, Disp: 100 tablet, Rfl: 0    gabapentin (NEURONTIN) 100 MG capsule, TAKE 1 CAPSULE BY MOUTH EVERY NIGHT AT BEDTIME (Patient taking differently: TAKE 1 CAPSULE BY MOUTH THREE TIMES DAILY), Disp: 90 capsule, Rfl: 0    metoprolol succinate (TOPROL XL) 50 MG extended release tablet, TAKE 1 TABLET BY MOUTH EVERY DAY, Disp: 90 tablet, Rfl: 3    aspirin 81 MG chewable tablet, Take 81 mg by mouth daily Indications: OTC, Disp: , Rfl:     glucose blood VI test strips (TRUETEST TEST) strip, 1 each by In Vitro route 4 times daily as needed, Disp: 100 each, Rfl: 3    Assessment/Plan:  Kaiden Cottrell was seen today for follow-up and diabetes.     Diagnoses and all orders for this visit:    Benign essential hypertension  Comments:  Chronic, controlled  Orders:  - Lipid Panel; Future    Hyperthyroidism  -     T4, Free; Future  -     TSH without Reflex; Future    Uncontrolled type 2 diabetes mellitus without complication, without long-term current use of insulin ([facility])  -      DIABETES FOOT EXAM  -     Microalbumin / creatinine urine ratio; Future    [facility]    Encounter for screening mammogram for breast cancer    Other orders  -     Cancel: [facility] Digital Screen Bilateral [[id number]]; Future  -     Cancel: Lipid Panel;  Future        Lab Results   Component Value Date    LABA1C 8.8 06/08/2018     Lab Results   Component Value Date    .9 06/08/2018       Rafael Khan MD

## 2018-08-22 RX ORDER — METOPROLOL SUCCINATE 50 MG/1
TABLET, EXTENDED RELEASE ORAL
Qty: 90 TABLET | Refills: 1 | Status: SHIPPED | OUTPATIENT
Start: 2018-08-22 | End: 2019-02-21 | Stop reason: SDUPTHER

## 2018-09-20 ENCOUNTER — HOSPITAL ENCOUNTER (OUTPATIENT)
Dept: CT IMAGING | Age: 68
Discharge: HOME | End: 2018-09-20
Attending: INTERNAL MEDICINE | Admitting: INTERNAL MEDICINE

## 2018-09-20 DIAGNOSIS — R06.02 SHORTNESS OF BREATH: ICD-10-CM

## 2018-09-20 DIAGNOSIS — C34.11 CANCER OF BRONCHUS OF RIGHT UPPER LOBE (HCC): ICD-10-CM

## 2018-09-20 DIAGNOSIS — C34.11 MALIGNANT NEOPLASM OF UPPER LOBE, RIGHT BRONCHUS OR LUNG (HCC): ICD-10-CM

## 2018-09-21 ENCOUNTER — TELEPHONE (OUTPATIENT)
Dept: PULMONOLOGY | Age: 68
End: 2018-09-21

## 2018-09-26 ENCOUNTER — OFFICE VISIT (OUTPATIENT)
Dept: INTERNAL MEDICINE CLINIC | Age: 68
End: 2018-09-26
Payer: MEDICARE

## 2018-09-26 ENCOUNTER — HOSPITAL ENCOUNTER (OUTPATIENT)
Age: 68
Discharge: HOME | End: 2018-09-26
Payer: MEDICARE

## 2018-09-26 VITALS
SYSTOLIC BLOOD PRESSURE: 138 MMHG | BODY MASS INDEX: 27.29 KG/M2 | DIASTOLIC BLOOD PRESSURE: 80 MMHG | WEIGHT: 139 LBS | HEIGHT: 60 IN | HEART RATE: 80 BPM

## 2018-09-26 DIAGNOSIS — J96.11 CHRONIC HYPOXEMIC RESPIRATORY FAILURE (HCC): Primary | ICD-10-CM

## 2018-09-26 DIAGNOSIS — J44.9 COPD, MODERATE (HCC): ICD-10-CM

## 2018-09-26 DIAGNOSIS — Z12.31 ENCOUNTER FOR SCREENING MAMMOGRAM FOR BREAST CANCER: Primary | ICD-10-CM

## 2018-09-26 DIAGNOSIS — I10 BENIGN ESSENTIAL HYPERTENSION: ICD-10-CM

## 2018-09-26 DIAGNOSIS — R06.02 SHORTNESS OF BREATH: ICD-10-CM

## 2018-09-26 DIAGNOSIS — C34.11 MALIGNANT NEOPLASM OF UPPER LOBE OF RIGHT LUNG (HCC): ICD-10-CM

## 2018-09-26 DIAGNOSIS — E05.90 HYPERTHYROIDISM: ICD-10-CM

## 2018-09-26 LAB
BASE EXCESS ARTERIAL: 2.1 MMOL/L (ref -3–3)
CARBOXYHEMOGLOBIN ARTERIAL: 2.9 % (ref 0–1.5)
HBA1C MFR BLD: 7 %
HCO3 ARTERIAL: 26.7 MMOL/L (ref 21–29)
HEMOGLOBIN, ART, EXTENDED: 10.2 G/DL (ref 12–16)
METHEMOGLOBIN ARTERIAL: 0.2 %
O2 CONTENT ARTERIAL: 13 ML/DL
O2 SAT, ARTERIAL: 90.1 %
O2 THERAPY: ABNORMAL
PCO2 ARTERIAL: 40.6 MMHG (ref 35–45)
PH ARTERIAL: 7.43 (ref 7.35–7.45)
PO2 ARTERIAL: 56.1 MMHG (ref 75–108)
T4 FREE: 2 NG/DL (ref 0.9–1.8)
TCO2 ARTERIAL: 62.5 MMOL/L
TSH SERPL DL<=0.05 MIU/L-ACNC: 0.02 UIU/ML (ref 0.27–4.2)

## 2018-09-26 PROCEDURE — 83036 HEMOGLOBIN GLYCOSYLATED A1C: CPT | Performed by: INTERNAL MEDICINE

## 2018-09-26 PROCEDURE — 3045F PR MOST RECENT HEMOGLOBIN A1C LEVEL 7.0-9.0%: CPT | Performed by: INTERNAL MEDICINE

## 2018-09-26 PROCEDURE — 84439 ASSAY OF FREE THYROXINE: CPT

## 2018-09-26 PROCEDURE — 36415 COLL VENOUS BLD VENIPUNCTURE: CPT

## 2018-09-26 PROCEDURE — 84443 ASSAY THYROID STIM HORMONE: CPT

## 2018-09-26 PROCEDURE — 82803 BLOOD GASES ANY COMBINATION: CPT

## 2018-09-26 PROCEDURE — G8427 DOCREV CUR MEDS BY ELIG CLIN: HCPCS | Performed by: INTERNAL MEDICINE

## 2018-09-26 PROCEDURE — 3023F SPIROM DOC REV: CPT | Performed by: INTERNAL MEDICINE

## 2018-09-26 PROCEDURE — 1036F TOBACCO NON-USER: CPT | Performed by: INTERNAL MEDICINE

## 2018-09-26 PROCEDURE — 1123F ACP DISCUSS/DSCN MKR DOCD: CPT | Performed by: INTERNAL MEDICINE

## 2018-09-26 PROCEDURE — 99214 OFFICE O/P EST MOD 30 MIN: CPT | Performed by: INTERNAL MEDICINE

## 2018-09-26 PROCEDURE — 1101F PT FALLS ASSESS-DOCD LE1/YR: CPT | Performed by: INTERNAL MEDICINE

## 2018-09-26 PROCEDURE — 3017F COLORECTAL CA SCREEN DOC REV: CPT | Performed by: INTERNAL MEDICINE

## 2018-09-26 PROCEDURE — G8417 CALC BMI ABV UP PARAM F/U: HCPCS | Performed by: INTERNAL MEDICINE

## 2018-09-26 PROCEDURE — 1090F PRES/ABSN URINE INCON ASSESS: CPT | Performed by: INTERNAL MEDICINE

## 2018-09-26 PROCEDURE — G8399 PT W/DXA RESULTS DOCUMENT: HCPCS | Performed by: INTERNAL MEDICINE

## 2018-09-26 PROCEDURE — 36600 WITHDRAWAL OF ARTERIAL BLOOD: CPT

## 2018-09-26 PROCEDURE — 4040F PNEUMOC VAC/ADMIN/RCVD: CPT | Performed by: INTERNAL MEDICINE

## 2018-09-26 PROCEDURE — G8926 SPIRO NO PERF OR DOC: HCPCS | Performed by: INTERNAL MEDICINE

## 2018-09-26 PROCEDURE — 2022F DILAT RTA XM EVC RTNOPTHY: CPT | Performed by: INTERNAL MEDICINE

## 2018-09-26 RX ORDER — TIZANIDINE 4 MG/1
4 TABLET ORAL EVERY 6 HOURS PRN
COMMUNITY
End: 2019-10-30 | Stop reason: ALTCHOICE

## 2018-09-26 RX ORDER — PREGABALIN 150 MG/1
150 CAPSULE ORAL 2 TIMES DAILY
COMMUNITY
End: 2019-10-30 | Stop reason: ALTCHOICE

## 2018-09-26 RX ORDER — PROPYLTHIOURACIL 50 MG/1
TABLET ORAL
Qty: 270 TABLET | Refills: 1 | Status: SHIPPED | OUTPATIENT
Start: 2018-09-26 | End: 2018-09-28 | Stop reason: SDUPTHER

## 2018-09-26 RX ORDER — HYDROCODONE BITARTRATE AND ACETAMINOPHEN 7.5; 325 MG/1; MG/1
1 TABLET ORAL 4 TIMES DAILY
COMMUNITY
End: 2019-10-30 | Stop reason: ALTCHOICE

## 2018-09-26 RX ORDER — PREDNISONE 10 MG/1
TABLET ORAL
Qty: 45 TABLET | Refills: 1 | Status: SHIPPED | OUTPATIENT
Start: 2018-09-26 | End: 2019-04-24 | Stop reason: ALTCHOICE

## 2018-09-26 ASSESSMENT — ENCOUNTER SYMPTOMS: SHORTNESS OF BREATH: 1

## 2018-09-26 NOTE — PROGRESS NOTES
SUBJECTIVE:  Patient ID: Michelet Wilson is a 70 y.o. y.o. female     HPI    Michelet Wilson returns for follow up of hypertension. Patient has been taking Her medications as prescribed. Patient's blood pressure is  controlled. Side effects related to taking the medications include no medication side effects noted    Patient returns to the office for follow up of her diabetes. Her last hemoglobin A1c was 8.2. She is compliant with her medications but admits to a lot of dietary noncompliance. Patient has symptoms of shortness of breath on exertion. Review of Systems   Respiratory: Positive for shortness of breath. Cardiovascular: Negative for chest pain, palpitations and leg swelling. OBJECTIVE:    /80   Pulse 80   Ht 5' (1.524 m)   Wt 139 lb (63 kg)   BMI 27.15 kg/m²      Physical Exam   Constitutional: She is oriented to person, place, and time. She appears well-developed and well-nourished. No distress. HENT:   Head: Normocephalic and atraumatic. Cardiovascular: Normal rate, regular rhythm, normal heart sounds and intact distal pulses. Exam reveals no gallop and no friction rub. No murmur heard. Pulmonary/Chest: Effort normal. No respiratory distress. She has no wheezes. She has rales (Distant crackles in the bases. Diminished BS in the base of the lungs. ). She exhibits no tenderness. Neurological: She is alert and oriented to person, place, and time. No cranial nerve deficit. Skin: Skin is warm and dry. She is not diaphoretic. Psychiatric: She has a normal mood and affect. Her behavior is normal. Judgment and thought content normal.   Vitals reviewed. Current Outpatient Prescriptions:     HYDROcodone-acetaminophen (NORCO) 7.5-325 MG per tablet, Take 1 tablet by mouth 4 times daily. ., Disp: , Rfl:     pregabalin (LYRICA) 150 MG capsule, Take 150 mg by mouth 2 times daily. ., Disp: , Rfl:     tiZANidine (ZANAFLEX) 4 MG tablet, Take 4 mg by mouth every 6 hours as needed, Disp: , Rfl:     propylthiouracil (PTU) 50 MG tablet, TAKE 1 TABLET BY MOUTH THREE TIMES DAILY, Disp: 270 tablet, Rfl: 1    metoprolol succinate (TOPROL XL) 50 MG extended release tablet, TAKE 1 TABLET BY MOUTH EVERY DAY, Disp: 90 tablet, Rfl: 1    buPROPion (WELLBUTRIN SR) 150 MG extended release tablet, TAKE 1 TABLET BY MOUTH TWICE DAILY, Disp: 180 tablet, Rfl: 1    ALPRAZolam (XANAX) 0.5 MG tablet, TAKE 1 TABLET BY MOUTH THREE TIMES DAILY AS NEEDED, Disp: 90 tablet, Rfl: 2    fluticasone-vilanterol (BREO ELLIPTA) 100-25 MCG/INH AEPB inhaler, Inhale 1 puff into the lungs daily, Disp: 1 each, Rfl: 6    albuterol sulfate  (90 Base) MCG/ACT inhaler, Inhale 2 puffs into the lungs every 6 hours as needed for Wheezing, Disp: 1 Inhaler, Rfl: 11    atorvastatin (LIPITOR) 20 MG tablet, TAKE 1 TABLET BY MOUTH EVERY DAY, Disp: 90 tablet, Rfl: 3    metFORMIN (GLUCOPHAGE) 1000 MG tablet, TAKE 1 TABLET BY MOUTH TWICE DAILY WITH MEALS, Disp: 180 tablet, Rfl: 3    gabapentin (NEURONTIN) 100 MG capsule, TAKE 1 CAPSULE BY MOUTH EVERY NIGHT AT BEDTIME (Patient taking differently: TAKE 1 CAPSULE BY MOUTH THREE TIMES DAILY), Disp: 90 capsule, Rfl: 0    aspirin 81 MG chewable tablet, Take 81 mg by mouth daily Indications: OTC, Disp: , Rfl:     glucose blood VI test strips (TRUETEST TEST) strip, 1 each by In Vitro route 4 times daily as needed, Disp: 100 each, Rfl: 3    Assessment/Plan:  Lachelle Villasenor was seen today for hypertension and neck pain. Diagnoses and all orders for this visit:    Encounter for screening mammogram for breast cancer  -     [facility] Digital Screen Bilateral [[id number]]; Future    Benign essential hypertension  Comments:  Chronic, controlled    Hyperthyroidism  -     propylthiouracil (PTU) 50 MG tablet; TAKE 1 TABLET BY MOUTH THREE TIMES DAILY  -     T4, Free; Future  -     TSH without Reflex;  Future    Uncontrolled type 2 diabetes mellitus without complication, without long-term current use of insulin (HCC)  -     Cancel: Hemoglobin A1C; Future    COPD, moderate ([address]  Comments:  Smoking cessation was stressed. Malignant neoplasm of upper lobe of right lung (HCC)  Comments:  Percocet, Gabapentin    Shortness of breath  -     Stress test, myoview; Future  -     BLOOD GAS, ARTERIAL; Future      Lab Results   Component Value Date    LABA1C 8.8 06/08/2018     Lab Results   Component Value Date    .9 06/08/2018     POCT HbA1c today is 7.0. Pulse ox in the office showed 83% on RA (nail polish on) and it went down to 80% after exercise.       Rafael Khan MD

## 2018-09-26 NOTE — PROGRESS NOTES
ABG drawn x  2 attempt(s) from Right Radial. Patient had positive modified Mike's Test with good collateral circulation. Patient was on room air at time of puncture. Pressure held for 5 minutes. No bleeding or bruising noted at puncture site.   Patient tolerated procedure well    Patient/caregiver was educated on the proper method of use:  Yes      Level of patient/caregiver understanding able to:   [] Verbalize understanding   [] Demonstrate understanding       [] Teach back        [] Needs reinforcement       []  No available caregiver               []  Other:     Response to education:  Excellent

## 2018-09-28 DIAGNOSIS — E05.90 HYPERTHYROIDISM: ICD-10-CM

## 2018-09-28 RX ORDER — PROPYLTHIOURACIL 50 MG/1
TABLET ORAL
Qty: 180 TABLET | Refills: 1 | Status: SHIPPED | OUTPATIENT
Start: 2018-09-28 | End: 2018-10-10 | Stop reason: SDUPTHER

## 2018-10-04 ENCOUNTER — HOSPITAL ENCOUNTER (OUTPATIENT)
Dept: CT IMAGING | Age: 68
Discharge: HOME | End: 2018-10-04
Payer: MEDICARE

## 2018-10-04 ENCOUNTER — HOSPITAL ENCOUNTER (OUTPATIENT)
Dept: NON INVASIVE DIAGNOSTICS | Age: 68
Discharge: HOME | End: 2018-10-04
Payer: MEDICARE

## 2018-10-04 DIAGNOSIS — J96.11 CHRONIC HYPOXEMIC RESPIRATORY FAILURE (HCC): ICD-10-CM

## 2018-10-04 DIAGNOSIS — R06.02 SHORTNESS OF BREATH: ICD-10-CM

## 2018-10-04 LAB
LV EF: 57 %
LVEF MODALITY: NORMAL

## 2018-10-04 PROCEDURE — 93017 CV STRESS TEST TRACING ONLY: CPT | Performed by: INTERNAL MEDICINE

## 2018-10-04 PROCEDURE — 78452 HT MUSCLE IMAGE SPECT MULT: CPT

## 2018-10-04 PROCEDURE — 93017 CV STRESS TEST TRACING ONLY: CPT

## 2018-10-04 PROCEDURE — 71250 CT THORAX DX C-: CPT

## 2018-10-04 PROCEDURE — 3430000000 HC RX DIAGNOSTIC RADIOPHARMACEUTICAL: Performed by: INTERNAL MEDICINE

## 2018-10-04 PROCEDURE — A9502 TC99M TETROFOSMIN: HCPCS | Performed by: INTERNAL MEDICINE

## 2018-10-04 RX ADMIN — TETROFOSMIN 10 MILLICURIE: 0.23 INJECTION, POWDER, LYOPHILIZED, FOR SOLUTION INTRAVENOUS at 07:36

## 2018-10-04 RX ADMIN — TETROFOSMIN 30 MILLICURIE: 0.23 INJECTION, POWDER, LYOPHILIZED, FOR SOLUTION INTRAVENOUS at 08:59

## 2018-10-04 NOTE — PROGRESS NOTES
Patient instructed on Bakari Protocol Stress Test Procedure including possible side effects and adverse reactions. Verbalizes knowledge and understanding and denies having any questions.  Mindy Lara RN

## 2018-10-10 ENCOUNTER — OFFICE VISIT (OUTPATIENT)
Dept: INTERNAL MEDICINE CLINIC | Age: 68
End: 2018-10-10
Payer: MEDICARE

## 2018-10-10 VITALS
WEIGHT: 137 LBS | HEART RATE: 74 BPM | SYSTOLIC BLOOD PRESSURE: 144 MMHG | HEIGHT: 60 IN | OXYGEN SATURATION: 90 % | DIASTOLIC BLOOD PRESSURE: 72 MMHG | BODY MASS INDEX: 26.9 KG/M2

## 2018-10-10 DIAGNOSIS — C34.2 MALIGNANT NEOPLASM OF MIDDLE LOBE OF RIGHT LUNG (HCC): ICD-10-CM

## 2018-10-10 DIAGNOSIS — R06.02 SHORTNESS OF BREATH: ICD-10-CM

## 2018-10-10 DIAGNOSIS — F33.41 RECURRENT MAJOR DEPRESSIVE DISORDER, IN PARTIAL REMISSION (HCC): Chronic | ICD-10-CM

## 2018-10-10 DIAGNOSIS — E05.90 HYPERTHYROIDISM: ICD-10-CM

## 2018-10-10 DIAGNOSIS — J44.9 COPD, MODERATE (HCC): Primary | ICD-10-CM

## 2018-10-10 PROBLEM — S90.32XA CONTUSION OF LEFT FOOT: Status: RESOLVED | Noted: 2017-03-08 | Resolved: 2018-10-10

## 2018-10-10 PROBLEM — M54.12 CERVICAL RADICULITIS: Status: RESOLVED | Noted: 2017-11-03 | Resolved: 2018-10-10

## 2018-10-10 PROBLEM — M75.80 ROTATOR CUFF TENDINITIS: Status: RESOLVED | Noted: 2017-11-03 | Resolved: 2018-10-10

## 2018-10-10 PROCEDURE — G8427 DOCREV CUR MEDS BY ELIG CLIN: HCPCS | Performed by: INTERNAL MEDICINE

## 2018-10-10 PROCEDURE — 3017F COLORECTAL CA SCREEN DOC REV: CPT | Performed by: INTERNAL MEDICINE

## 2018-10-10 PROCEDURE — 1123F ACP DISCUSS/DSCN MKR DOCD: CPT | Performed by: INTERNAL MEDICINE

## 2018-10-10 PROCEDURE — 1036F TOBACCO NON-USER: CPT | Performed by: INTERNAL MEDICINE

## 2018-10-10 PROCEDURE — G8417 CALC BMI ABV UP PARAM F/U: HCPCS | Performed by: INTERNAL MEDICINE

## 2018-10-10 PROCEDURE — G8926 SPIRO NO PERF OR DOC: HCPCS | Performed by: INTERNAL MEDICINE

## 2018-10-10 PROCEDURE — G8399 PT W/DXA RESULTS DOCUMENT: HCPCS | Performed by: INTERNAL MEDICINE

## 2018-10-10 PROCEDURE — G8484 FLU IMMUNIZE NO ADMIN: HCPCS | Performed by: INTERNAL MEDICINE

## 2018-10-10 PROCEDURE — 99214 OFFICE O/P EST MOD 30 MIN: CPT | Performed by: INTERNAL MEDICINE

## 2018-10-10 PROCEDURE — 4040F PNEUMOC VAC/ADMIN/RCVD: CPT | Performed by: INTERNAL MEDICINE

## 2018-10-10 PROCEDURE — 3023F SPIROM DOC REV: CPT | Performed by: INTERNAL MEDICINE

## 2018-10-10 PROCEDURE — 1101F PT FALLS ASSESS-DOCD LE1/YR: CPT | Performed by: INTERNAL MEDICINE

## 2018-10-10 PROCEDURE — 1090F PRES/ABSN URINE INCON ASSESS: CPT | Performed by: INTERNAL MEDICINE

## 2018-10-10 RX ORDER — PROPYLTHIOURACIL 50 MG/1
50 TABLET ORAL 3 TIMES DAILY
Qty: 270 TABLET | Refills: 1 | Status: SHIPPED | OUTPATIENT
Start: 2018-10-10 | End: 2019-08-06

## 2018-10-10 RX ORDER — ALPRAZOLAM 0.5 MG/1
TABLET ORAL
Qty: 90 TABLET | Refills: 2 | Status: SHIPPED | OUTPATIENT
Start: 2018-10-10 | End: 2018-11-10

## 2018-10-10 NOTE — PROGRESS NOTES
Physical Exam   Constitutional: She is oriented to person, place, and time. She appears well-developed and well-nourished. No distress. HENT:   Head: Normocephalic and atraumatic. Pulmonary/Chest: Effort normal. No respiratory distress. Neurological: She is alert and oriented to person, place, and time. No cranial nerve deficit. Skin: Skin is warm and dry. She is not diaphoretic. Psychiatric: She has a normal mood and affect. Her behavior is normal. Judgment and thought content normal.   Vitals reviewed. Assessment/Plan:  Bertram Portillo was seen today for shortness of breath. Diagnoses and all orders for this visit:    COPD, moderate ([address]  Comments: This is the cause of her shortness of breath. Orders:  -     DME Order for Home Oxygen as OP    Hyperthyroidism  -     propylthiouracil (PTU) 50 MG tablet; Take 1 tablet by mouth 3 times daily TAKE 1 TABLET BY MOUTH TWO TIMES DAILY    Shortness of breath  Comments:  COPD related I believe  Orders:  -     DME Order for Home Oxygen as OP    Recurrent major depressive disorder, in partial remission (HCC)  -     ALPRAZolam (XANAX) 0.5 MG tablet; TAKE 1 TABLET BY MOUTH THREE TIMES DAILY AS NEEDED.     Malignant neoplasm of middle lobe of right lung ([address]  -     DME Order for Home Oxygen as OP        Agustin Avilez MD

## 2018-10-11 ENCOUNTER — TELEPHONE (OUTPATIENT)
Dept: INTERNAL MEDICINE CLINIC | Age: 68
End: 2018-10-11

## 2018-10-12 NOTE — TELEPHONE ENCOUNTER
Returned call. Didn't seem interested in helping patient get oxygen, even though her room air oxygen was at 83% at her office visit. Informed her that she obviously isn't going to help and will look at another company. Fax referral to [address]

## 2019-02-04 DIAGNOSIS — F33.0 MAJOR DEPRESSIVE DISORDER, RECURRENT EPISODE, MILD (HCC): Chronic | ICD-10-CM

## 2019-02-04 RX ORDER — BUPROPION HYDROCHLORIDE 150 MG/1
TABLET, EXTENDED RELEASE ORAL
Qty: 180 TABLET | Refills: 1 | Status: SHIPPED | OUTPATIENT
Start: 2019-02-04 | End: 2019-08-29 | Stop reason: SDUPTHER

## 2019-03-01 ENCOUNTER — HOSPITAL ENCOUNTER (OUTPATIENT)
Age: 69
Discharge: HOME | End: 2019-03-01
Payer: MEDICARE

## 2019-03-01 ENCOUNTER — HOSPITAL ENCOUNTER (OUTPATIENT)
Dept: CT IMAGING | Age: 69
Discharge: HOME | End: 2019-03-01
Payer: MEDICARE

## 2019-03-01 DIAGNOSIS — C34.12 MALIGNANT NEOPLASM OF UPPER LOBE OF LEFT LUNG (HCC): ICD-10-CM

## 2019-03-01 LAB
BUN BLDV-MCNC: 11 MG/DL (ref 7–20)
CREAT SERPL-MCNC: 0.7 MG/DL (ref 0.6–1.2)
GFR AFRICAN AMERICAN: >60
GFR NON-AFRICAN AMERICAN: >60

## 2019-03-01 PROCEDURE — 71260 CT THORAX DX C+: CPT

## 2019-03-01 PROCEDURE — 6360000004 HC RX CONTRAST MEDICATION: Performed by: NURSE PRACTITIONER

## 2019-03-01 PROCEDURE — 82565 ASSAY OF CREATININE: CPT

## 2019-03-01 PROCEDURE — 84520 ASSAY OF UREA NITROGEN: CPT

## 2019-03-01 PROCEDURE — 36415 COLL VENOUS BLD VENIPUNCTURE: CPT

## 2019-03-01 RX ADMIN — IOPAMIDOL 75 ML: 755 INJECTION, SOLUTION INTRAVENOUS at 11:54

## 2019-04-24 ENCOUNTER — OFFICE VISIT (OUTPATIENT)
Dept: INTERNAL MEDICINE CLINIC | Age: 69
End: 2019-04-24
Payer: MEDICARE

## 2019-04-24 VITALS
HEART RATE: 72 BPM | HEIGHT: 60 IN | BODY MASS INDEX: 25.72 KG/M2 | SYSTOLIC BLOOD PRESSURE: 138 MMHG | WEIGHT: 131 LBS | DIASTOLIC BLOOD PRESSURE: 74 MMHG

## 2019-04-24 DIAGNOSIS — E11.9 CONTROLLED TYPE 2 DIABETES MELLITUS WITHOUT COMPLICATION, WITHOUT LONG-TERM CURRENT USE OF INSULIN (HCC): ICD-10-CM

## 2019-04-24 DIAGNOSIS — K02.9 INFECTED DENTAL CARRIES: ICD-10-CM

## 2019-04-24 DIAGNOSIS — J44.9 COPD, MODERATE (HCC): ICD-10-CM

## 2019-04-24 DIAGNOSIS — E05.90 HYPERTHYROIDISM: ICD-10-CM

## 2019-04-24 DIAGNOSIS — K04.7 INFECTED DENTAL CARRIES: ICD-10-CM

## 2019-04-24 DIAGNOSIS — F33.41 RECURRENT MAJOR DEPRESSIVE DISORDER, IN PARTIAL REMISSION (HCC): Primary | Chronic | ICD-10-CM

## 2019-04-24 PROCEDURE — 99213 OFFICE O/P EST LOW 20 MIN: CPT | Performed by: INTERNAL MEDICINE

## 2019-04-24 PROCEDURE — 1123F ACP DISCUSS/DSCN MKR DOCD: CPT | Performed by: INTERNAL MEDICINE

## 2019-04-24 PROCEDURE — G8427 DOCREV CUR MEDS BY ELIG CLIN: HCPCS | Performed by: INTERNAL MEDICINE

## 2019-04-24 PROCEDURE — 3046F HEMOGLOBIN A1C LEVEL >9.0%: CPT | Performed by: INTERNAL MEDICINE

## 2019-04-24 PROCEDURE — 1036F TOBACCO NON-USER: CPT | Performed by: INTERNAL MEDICINE

## 2019-04-24 PROCEDURE — 3017F COLORECTAL CA SCREEN DOC REV: CPT | Performed by: INTERNAL MEDICINE

## 2019-04-24 PROCEDURE — 2022F DILAT RTA XM EVC RTNOPTHY: CPT | Performed by: INTERNAL MEDICINE

## 2019-04-24 PROCEDURE — 3023F SPIROM DOC REV: CPT | Performed by: INTERNAL MEDICINE

## 2019-04-24 PROCEDURE — 1090F PRES/ABSN URINE INCON ASSESS: CPT | Performed by: INTERNAL MEDICINE

## 2019-04-24 PROCEDURE — G8926 SPIRO NO PERF OR DOC: HCPCS | Performed by: INTERNAL MEDICINE

## 2019-04-24 PROCEDURE — G8399 PT W/DXA RESULTS DOCUMENT: HCPCS | Performed by: INTERNAL MEDICINE

## 2019-04-24 PROCEDURE — 4040F PNEUMOC VAC/ADMIN/RCVD: CPT | Performed by: INTERNAL MEDICINE

## 2019-04-24 PROCEDURE — G8417 CALC BMI ABV UP PARAM F/U: HCPCS | Performed by: INTERNAL MEDICINE

## 2019-04-24 RX ORDER — AMOXICILLIN AND CLAVULANATE POTASSIUM 875; 125 MG/1; MG/1
1 TABLET, FILM COATED ORAL 2 TIMES DAILY WITH MEALS
Qty: 20 TABLET | Refills: 0 | Status: SHIPPED | OUTPATIENT
Start: 2019-04-24 | End: 2019-05-04

## 2019-04-24 RX ORDER — ALPRAZOLAM 0.5 MG/1
TABLET ORAL
Refills: 2 | COMMUNITY
Start: 2019-03-25 | End: 2019-06-17 | Stop reason: SDUPTHER

## 2019-04-24 RX ORDER — METHIMAZOLE 5 MG/1
5 TABLET ORAL 3 TIMES DAILY
Qty: 90 TABLET | Refills: 3 | Status: SHIPPED | OUTPATIENT
Start: 2019-04-24 | End: 2019-05-24

## 2019-04-24 NOTE — PROGRESS NOTES
TABLET BY MOUTH EVERY DAY, Disp: 90 tablet, Rfl: 1    buPROPion (WELLBUTRIN SR) 150 MG extended release tablet, TAKE 1 TABLET BY MOUTH TWICE DAILY, Disp: 180 tablet, Rfl: 1    propylthiouracil (PTU) 50 MG tablet, Take 1 tablet by mouth 3 times daily TAKE 1 TABLET BY MOUTH TWO TIMES DAILY, Disp: 270 tablet, Rfl: 1    HYDROcodone-acetaminophen (NORCO) 7.5-325 MG per tablet, Take 1 tablet by mouth 4 times daily. ., Disp: , Rfl:     pregabalin (LYRICA) 150 MG capsule, Take 150 mg by mouth 2 times daily. ., Disp: , Rfl:     tiZANidine (ZANAFLEX) 4 MG tablet, Take 4 mg by mouth every 6 hours as needed, Disp: , Rfl:     fluticasone-vilanterol (BREO ELLIPTA) 100-25 MCG/INH AEPB inhaler, Inhale 1 puff into the lungs daily, Disp: 1 each, Rfl: 6    albuterol sulfate  (90 Base) MCG/ACT inhaler, Inhale 2 puffs into the lungs every 6 hours as needed for Wheezing, Disp: 1 Inhaler, Rfl: 11    atorvastatin (LIPITOR) 20 MG tablet, TAKE 1 TABLET BY MOUTH EVERY DAY, Disp: 90 tablet, Rfl: 3    gabapentin (NEURONTIN) 100 MG capsule, TAKE 1 CAPSULE BY MOUTH EVERY NIGHT AT BEDTIME (Patient taking differently: TAKE 1 CAPSULE BY MOUTH THREE TIMES DAILY), Disp: 90 capsule, Rfl: 0    aspirin 81 MG chewable tablet, Take 81 mg by mouth daily Indications: OTC, Disp: , Rfl:     blood glucose test strips (TRUETEST TEST) strip, 1 each by In Vitro route 4 times daily for 5 days, Disp: 100 strip, Rfl: 5    Assessment/Plan:  Ana María Brown was seen today for copd and dental pain. Diagnoses and all orders for this visit:    Recurrent major depressive disorder, in partial remission ([address]  Comments:  Still taking Wellbutrin. COPD, moderate ([address]  Comments: This condition is stable. Patient takes her medications as prescribed. Controlled type 2 diabetes mellitus without complication, without long-term current use of insulin (HCC)  -     Hemoglobin A1C; Future    Hyperthyroidism  -     T4, Free; Future  -     TSH without Reflex;  Future  - methimazole (TAPAZOLE) 5 MG tablet; Take 1 tablet by mouth 3 times daily    Infected dental carries  -     amoxicillin-clavulanate (AUGMENTIN) 875-125 MG per tablet;  Take 1 tablet by mouth 2 times daily (with meals) for 10 days        Lab Results   Component Value Date    LABA1C 7.0 09/26/2018     Lab Results   Component Value Date    .9 06/08/2018        Sri Cummings MD

## 2019-07-23 NOTE — PROGRESS NOTES
1.  Has the patient had a previous reaction to IV contrast? n    2.  Does the patient have kidney disease? n    3.  Is the patient on dialysis? n    If YES to any of these questions, exam will be reviewed with a Radiologist before administering contrast.       Admission Note    Data:  Crystal Ferrara admitted to 334 from emergency room at 0215.      Action:  Dr. Ribera has been notified of admission. Pt oriented to unit, call light in reach.     Response:  Patient tolerated transfer well ambulating to bed independently.  Linda Roque RN.............................2/8/2019 7:20 AM         IV Contrast- Discharge Instructions After Your CT Scan      The IV contrast you received today will be filtered from your bloodstream by your kidneys during the next 24 hours and pass from the body in urine.  You will not be aware of this process and your urine will not change in color.  To help this process you should drink at least 4 additional glasses of water or juice today.  This reduces stress on your kidneys.    Most contrast reactions are immediate.  Should you develop symptoms of concern after discharge, contact the department at the number below.  After hours you should contact your personal physician.  If you develop breathing distress or wheezing, call 911.         NSG DISCHARGE NOTE    Patient discharged to home at 2:00 PM via wheel chair. Accompanied by staff. Discharge instructions reviewed with patient, opportunity offered to ask questions. Prescriptions sent to patients preferred pharmacy. All belongings sent with patient. Pt left percocet paper prescription in her room. Pt notified by phone at [phone] that she had forgotten it. Pt stated that she would come back to get the perception as soon as she could. Paper prescription in currently in pt's shadow chart.     Maddie Jimenez   Pt picked up paper prescription at 1920.  Maddie Jimenez, RN on 2/9/2019 at 7:26 PM      MG tablet TAKE 1 TABLET BY MOUTH THREE TIMES DAILY AS NEEDED 80 tablet 0    oxyCODONE (ROXICODONE) 5 MG immediate release tablet Take 1 tablet by mouth every 4-6 hours as needed for pain. Earliest Fill Date: 8/15/17 100 tablet 0    gabapentin (NEURONTIN) 100 MG capsule TAKE 1 CAPSULE BY MOUTH EVERY NIGHT AT BEDTIME 90 capsule 0    meloxicam (MOBIC) 7.5 MG tablet TAKE 1 TABLET BY MOUTH DAILY 30 tablet 0    fluticasone-vilanterol (BREO ELLIPTA) 100-25 MCG/INH AEPB inhaler Inhale 1 puff into the lungs daily 1 each 6    metoprolol succinate (TOPROL XL) 50 MG extended release tablet TAKE 1 TABLET BY MOUTH EVERY DAY 90 tablet 3    atorvastatin (LIPITOR) 20 MG tablet TAKE 1 TABLET BY MOUTH EVERY DAY 90 tablet 3    triamcinolone (KENALOG) 0.1 % cream Apply topically 2 times daily. 453 g 3    metFORMIN (GLUCOPHAGE) 1000 MG tablet TAKE 1 TABLET BY MOUTH TWICE DAILY WITH MEALS 180 tablet 3    buPROPion (WELLBUTRIN SR) 150 MG extended release tablet TAKE 1 TABLET BY MOUTH TWICE DAILY 180 tablet 3    albuterol sulfate  (90 BASE) MCG/ACT inhaler Inhale 2 puffs into the lungs every 6 hours as needed for Wheezing 1 Inhaler 11    magnesium oxide (MAG-OX) 400 (241.3 MG) MG TABS tablet Take 1 tablet by mouth 2 times daily 60 tablet 1    glucose blood VI test strips (TRUETEST TEST) strip 1 each by In Vitro route 4 times daily as needed 100 each 3    mirtazapine (REMERON) 15 MG tablet Take 1 tablet by mouth nightly 30 tablet 3    ondansetron (ZOFRAN) 4 MG tablet Take 1 tablet by mouth every 6 hours as needed for Nausea or Vomiting 40 tablet 3    pantoprazole (PROTONIX) 40 MG tablet Take 1 tablet by mouth daily 30 tablet 0    aspirin 81 MG chewable tablet Take 81 mg by mouth daily Indications: OTC       No current facility-administered medications for this visit.         Past Surgical History:   Procedure Laterality Date    BLADDER REPAIR  1972    tuck    HYSTERECTOMY  1972    THORACOTOMY Right 6/17/2015     - 10.6 mg/dL    Total Protein 6.7 6.4 - 8.2 g/dL    Alb 3.5 3.4 - 5.0 g/dL    Albumin/Globulin Ratio 1.1 1.1 - 2.2    Total Bilirubin 0.4 0.0 - 1.0 mg/dL    Alkaline Phosphatase 114 40 - 129 U/L    ALT 28 10 - 40 U/L    AST 22 15 - 37 U/L    Globulin 3.2 g/dL   Results for orders placed or performed during the hospital encounter of 10/27/17 (from the past 504 hour(s))   Sedimentation Rate    Collection Time: 10/27/17 11:54 AM   Result Value Ref Range    Sed Rate 44 (H) 0 - 30 mm/Hr   TSH without Reflex    Collection Time: 10/27/17 11:54 AM   Result Value Ref Range    TSH <0.01 (L) 0.27 - 4.20 uIU/mL   T4, Free    Collection Time: 10/27/17 11:54 AM   Result Value Ref Range    T4 Free 1.8 0.9 - 1.8 ng/dL           Tammie Stephens MD FACS  [facility]  11/17/2017 Patient is a 68y old  Female who presents with a chief complaint of     HPI:    67 y/o F with PMH of HTN, Dyslipidemia, Spinal stenosis, OA, GERD, and Obesity s/p anterior cervical laminectomy(2019) and today is s/p posterior cervical laminectomy POD #0 had procedure done today because since May 2019, she was moved a heavy case of water that caused her arms and hands to feel numb.  She initally saw a neurologist who did an MRI and advised her to see an orthopedic surgeon.  The orthopedic surgeon performed an Anterior Cervical Laminectomy in  of this year.  However she still had numbness in both arms and she was told that a Posterior Cervical Laminectomy will help.      Pt is S/P procedure.  She says she is still having numbness in both hands/arms Left < Right.  She also complaining of weakness in both Left greater than right.      PAST MEDICAL & SURGICAL HISTORY:  Compressed cervical disc: c4-c5  GERD (gastroesophageal reflux disease)  Hyperlipidemia  HTN (hypertension)  Diverticulitis  Chronic back pain  Class 1 obesity with body mass index (BMI) of 33.0 to 33.9 in adult  H/O: osteoarthritis  S/P laminectomy: 19- Anterior Cervical Laminectomy  H/O: hysterectomy: partial   H/O: : x 2 -      REVIEW OF SYSTEMS:    CONSTITUTIONAL: No fever, weight loss, or fatigue  EYES: No eye pain, visual disturbances, or discharge  ENMT:  No difficulty hearing, tinnitus, vertigo; No sinus or throat pain  NECK: positive neck pain after procedure  RESPIRATORY: No cough, wheezing, chills or hemoptysis; No shortness of breath  CARDIOVASCULAR: No chest pain, palpitations, dizziness, or leg swelling  GASTROINTESTINAL: No abdominal or epigastric pain. No nausea, vomiting, or hematemesis; No diarrhea or constipation. No melena or hematochezia.  GENITOURINARY: No dysuria, frequency, hematuria, or incontinence  NEUROLOGICAL: described above  SKIN: No itching, burning, rashes, or lesions   LYMPH NODES: No enlarged glands  ENDOCRINE: No heat or cold intolerance; No hair loss  MUSCULOSKELETAL: weakness in arms and hands Left > Right  PSYCHIATRIC: No depression, anxiety, mood swings, or difficulty sleeping  HEME/LYMPH: No easy bruising, or bleeding gums  ALLERGY AND IMMUNOLOGIC: No hives or eczema      MEDICATIONS  (STANDING):  atorvastatin 10 milliGRAM(s) Oral at bedtime  ceFAZolin   IVPB 2000 milliGRAM(s) IV Intermittent every 8 hours  docusate sodium 100 milliGRAM(s) Oral three times a day  gabapentin 300 milliGRAM(s) Oral at bedtime  lactated ringers. 1000 milliLiter(s) (100 mL/Hr) IV Continuous <Continuous>  pantoprazole    Tablet 40 milliGRAM(s) Oral before breakfast  senna 2 Tablet(s) Oral at bedtime    MEDICATIONS  (PRN):  diazepam    Tablet 5 milliGRAM(s) Oral every 8 hours PRN muscle spasms  HYDROmorphone  Injectable 0.5 milliGRAM(s) IV Push every 3 hours PRN Severe Pain (7 - 10)  magnesium hydroxide Suspension 30 milliLiter(s) Oral every 12 hours PRN Constipation  oxyCODONE    IR 5 milliGRAM(s) Oral every 3 hours PRN Mild Pain (1 - 3)  oxyCODONE    IR 10 milliGRAM(s) Oral every 3 hours PRN Moderate Pain (4 - 6)      Allergies    No Known Allergies    Intolerances        SOCIAL HISTORY:    smokes 3 cigs a day  glass of wine at night  no drugs      FAMILY HISTORY:  FH: type 2 diabetes: brother- A- age 73  FH: prostate cancer: brother- A- age 78  Family hx of alcoholism: mother -  Family history of breast cancer in mother: mother-   FH: type 2 diabetes: father-       Vital Signs Last 24 Hrs  T(C): 36.9 (2019 21:20), Max: 36.9 (2019 21:20)  T(F): 98.4 (2019 21:20), Max: 98.4 (2019 21:20)  HR: 80 (2019 21:20) (65 - 81)  BP: 156/64 (2019 21:20) (148/84 - 160/75)  BP(mean): --  RR: 20 (2019 21:20) (10 - 24)  SpO2: 100% (2019 21:20) (97% - 100%)    PHYSICAL EXAM:    GENERAL: NAD, well-groomed, well-developed  HEAD:  Atraumatic, Normocephalic  EYES: EOMI, PERRLA, conjunctiva and sclera clear  ENMT: No tonsillar erythema, exudates, or enlargement; Moist mucous membranes  NECK: Supple  NERVOUS SYSTEM:  Alert & Oriented X3, Good concentration; Motor Strength 5/5 IN right upper hand, 4/5 in left upper hand.   CHEST/LUNG: Clear to percussion bilaterally; No rales, rhonchi, wheezing, or rubs  HEART: Regular rate and rhythm; No murmurs, rubs, or gallops  ABDOMEN: Soft, Nontender, Nondistended; Bowel sounds present  EXTREMITIES:  2+ Peripheral Pulses, No clubbing, cyanosis, or edema  LYMPH: No lymphadenopathy noted  SKIN: No rashes or lesions

## 2019-07-26 ENCOUNTER — OFFICE VISIT (OUTPATIENT)
Dept: INTERNAL MEDICINE CLINIC | Age: 69
End: 2019-07-26
Payer: MEDICARE

## 2019-07-26 VITALS
HEART RATE: 64 BPM | SYSTOLIC BLOOD PRESSURE: 124 MMHG | BODY MASS INDEX: 25.13 KG/M2 | HEIGHT: 60 IN | WEIGHT: 128 LBS | DIASTOLIC BLOOD PRESSURE: 70 MMHG

## 2019-07-26 DIAGNOSIS — J44.9 COPD, MODERATE (HCC): ICD-10-CM

## 2019-07-26 DIAGNOSIS — E05.90 HYPERTHYROIDISM: Primary | ICD-10-CM

## 2019-07-26 DIAGNOSIS — C34.2 MALIGNANT NEOPLASM OF MIDDLE LOBE OF RIGHT LUNG (HCC): ICD-10-CM

## 2019-07-26 DIAGNOSIS — E11.9 CONTROLLED TYPE 2 DIABETES MELLITUS WITHOUT COMPLICATION, WITHOUT LONG-TERM CURRENT USE OF INSULIN (HCC): ICD-10-CM

## 2019-07-26 PROCEDURE — G8417 CALC BMI ABV UP PARAM F/U: HCPCS | Performed by: INTERNAL MEDICINE

## 2019-07-26 PROCEDURE — 99214 OFFICE O/P EST MOD 30 MIN: CPT | Performed by: INTERNAL MEDICINE

## 2019-07-26 PROCEDURE — 1090F PRES/ABSN URINE INCON ASSESS: CPT | Performed by: INTERNAL MEDICINE

## 2019-07-26 PROCEDURE — 1123F ACP DISCUSS/DSCN MKR DOCD: CPT | Performed by: INTERNAL MEDICINE

## 2019-07-26 PROCEDURE — 3017F COLORECTAL CA SCREEN DOC REV: CPT | Performed by: INTERNAL MEDICINE

## 2019-07-26 PROCEDURE — G8926 SPIRO NO PERF OR DOC: HCPCS | Performed by: INTERNAL MEDICINE

## 2019-07-26 PROCEDURE — 4040F PNEUMOC VAC/ADMIN/RCVD: CPT | Performed by: INTERNAL MEDICINE

## 2019-07-26 PROCEDURE — 1036F TOBACCO NON-USER: CPT | Performed by: INTERNAL MEDICINE

## 2019-07-26 PROCEDURE — 3045F PR MOST RECENT HEMOGLOBIN A1C LEVEL 7.0-9.0%: CPT | Performed by: INTERNAL MEDICINE

## 2019-07-26 PROCEDURE — 3023F SPIROM DOC REV: CPT | Performed by: INTERNAL MEDICINE

## 2019-07-26 PROCEDURE — G8399 PT W/DXA RESULTS DOCUMENT: HCPCS | Performed by: INTERNAL MEDICINE

## 2019-07-26 PROCEDURE — 2022F DILAT RTA XM EVC RTNOPTHY: CPT | Performed by: INTERNAL MEDICINE

## 2019-07-26 PROCEDURE — G8427 DOCREV CUR MEDS BY ELIG CLIN: HCPCS | Performed by: INTERNAL MEDICINE

## 2019-07-26 RX ORDER — FLUTICASONE FUROATE AND VILANTEROL 100; 25 UG/1; UG/1
1 POWDER RESPIRATORY (INHALATION) DAILY
Qty: 2 EACH | Refills: 0 | COMMUNITY
Start: 2019-07-26 | End: 2019-10-30

## 2019-07-26 RX ORDER — FLUTICASONE FUROATE AND VILANTEROL 100; 25 UG/1; UG/1
1 POWDER RESPIRATORY (INHALATION) DAILY
Qty: 1 EACH | Refills: 5 | Status: SHIPPED | OUTPATIENT
Start: 2019-07-26 | End: 2021-05-03 | Stop reason: SDUPTHER

## 2019-07-26 NOTE — PROGRESS NOTES
No erythema. Psychiatric: She has a normal mood and affect. Her behavior is normal. Judgment and thought content normal.   Nursing note and vitals reviewed. Current Outpatient Medications:     atorvastatin (LIPITOR) 20 MG tablet, TAKE 1 TABLET BY MOUTH EVERY DAY, Disp: 90 tablet, Rfl: 1    ALPRAZolam (XANAX) 0.5 MG tablet, TAKE 1 TABLET BY MOUTH THREE TIMES DAILY AS NEEDED, Disp: 90 tablet, Rfl: 2    metFORMIN (GLUCOPHAGE) 1000 MG tablet, TAKE 1 TABLET BY MOUTH TWICE DAILY WITH MEALS, Disp: 180 tablet, Rfl: 3    metoprolol succinate (TOPROL XL) 50 MG extended release tablet, TAKE 1 TABLET BY MOUTH EVERY DAY, Disp: 90 tablet, Rfl: 1    buPROPion (WELLBUTRIN SR) 150 MG extended release tablet, TAKE 1 TABLET BY MOUTH TWICE DAILY, Disp: 180 tablet, Rfl: 1    propylthiouracil (PTU) 50 MG tablet, Take 1 tablet by mouth 3 times daily TAKE 1 TABLET BY MOUTH TWO TIMES DAILY, Disp: 270 tablet, Rfl: 1    fluticasone-vilanterol (BREO ELLIPTA) 100-25 MCG/INH AEPB inhaler, Inhale 1 puff into the lungs daily, Disp: 1 each, Rfl: 6    gabapentin (NEURONTIN) 100 MG capsule, TAKE 1 CAPSULE BY MOUTH EVERY NIGHT AT BEDTIME (Patient taking differently: TAKE 1 CAPSULE BY MOUTH THREE TIMES DAILY), Disp: 90 capsule, Rfl: 0    aspirin 81 MG chewable tablet, Take 81 mg by mouth daily Indications: OTC, Disp: , Rfl:     blood glucose test strips (TRUETEST TEST) strip, 1 each by In Vitro route 4 times daily for 5 days, Disp: 100 strip, Rfl: 5    HYDROcodone-acetaminophen (NORCO) 7.5-325 MG per tablet, Take 1 tablet by mouth 4 times daily. ., Disp: , Rfl:     pregabalin (LYRICA) 150 MG capsule, Take 150 mg by mouth 2 times daily. ., Disp: , Rfl:     tiZANidine (ZANAFLEX) 4 MG tablet, Take 4 mg by mouth every 6 hours as needed, Disp: , Rfl:     albuterol sulfate  (90 Base) MCG/ACT inhaler, Inhale 2 puffs into the lungs every 6 hours as needed for Wheezing, Disp: 1 Inhaler, Rfl: 11    Assessment/Plan:  Sandra Veloz was seen today

## 2019-08-06 RX ORDER — METHIMAZOLE 10 MG/1
10 TABLET ORAL 3 TIMES DAILY
Qty: 90 TABLET | Refills: 3 | Status: SHIPPED | OUTPATIENT
Start: 2019-08-06 | End: 2019-12-30

## 2019-09-19 RX ORDER — BLOOD-GLUCOSE METER
1 EACH MISCELLANEOUS DAILY
COMMUNITY
End: 2019-09-19 | Stop reason: SDUPTHER

## 2019-09-19 RX ORDER — BLOOD-GLUCOSE METER
1 EACH MISCELLANEOUS DAILY
Qty: 1 KIT | Refills: 0 | Status: SHIPPED | OUTPATIENT
Start: 2019-09-19 | End: 2022-03-10

## 2019-09-19 NOTE — TELEPHONE ENCOUNTER
Pt needing a new meter. She specifically asked for a certain type. Device pended.     Patient asking for a refill on her DM Meter    Last office visit 7/26/19    Next office visit 10/30/19

## 2019-09-20 RX ORDER — LANCETS
1 EACH MISCELLANEOUS 4 TIMES DAILY
Qty: 400 EACH | Refills: 3 | Status: SHIPPED | OUTPATIENT
Start: 2019-09-20 | End: 2019-09-20 | Stop reason: SDUPTHER

## 2019-09-26 DIAGNOSIS — F32.A DEPRESSION, UNSPECIFIED DEPRESSION TYPE: Primary | ICD-10-CM

## 2019-09-27 RX ORDER — ALPRAZOLAM 0.5 MG/1
TABLET ORAL
Qty: 90 TABLET | Refills: 0 | Status: SHIPPED | OUTPATIENT
Start: 2019-09-27 | End: 2019-12-01 | Stop reason: SDUPTHER

## 2019-10-30 ENCOUNTER — OFFICE VISIT (OUTPATIENT)
Dept: INTERNAL MEDICINE CLINIC | Age: 69
End: 2019-10-30
Payer: MEDICARE

## 2019-10-30 VITALS
SYSTOLIC BLOOD PRESSURE: 136 MMHG | HEART RATE: 72 BPM | BODY MASS INDEX: 25.52 KG/M2 | HEIGHT: 60 IN | WEIGHT: 130 LBS | DIASTOLIC BLOOD PRESSURE: 80 MMHG

## 2019-10-30 DIAGNOSIS — J44.9 COPD, MODERATE (HCC): ICD-10-CM

## 2019-10-30 DIAGNOSIS — E11.9 CONTROLLED TYPE 2 DIABETES MELLITUS WITHOUT COMPLICATION, WITHOUT LONG-TERM CURRENT USE OF INSULIN (HCC): ICD-10-CM

## 2019-10-30 DIAGNOSIS — H91.93 DECREASED HEARING OF BOTH EARS: ICD-10-CM

## 2019-10-30 DIAGNOSIS — Z00.00 ROUTINE GENERAL MEDICAL EXAMINATION AT A HEALTH CARE FACILITY: Primary | ICD-10-CM

## 2019-10-30 DIAGNOSIS — E05.90 HYPERTHYROIDISM: ICD-10-CM
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
## 2019-10-30 DIAGNOSIS — I10 BENIGN ESSENTIAL HYPERTENSION: ICD-10-CM

## 2019-10-30 DIAGNOSIS — F43.21 SITUATIONAL DEPRESSION: ICD-10-CM

## 2019-10-30 PROCEDURE — 4040F PNEUMOC VAC/ADMIN/RCVD: CPT | Performed by: INTERNAL MEDICINE

## 2019-10-30 PROCEDURE — 1123F ACP DISCUSS/DSCN MKR DOCD: CPT | Performed by: INTERNAL MEDICINE

## 2019-10-30 PROCEDURE — 3017F COLORECTAL CA SCREEN DOC REV: CPT | Performed by: INTERNAL MEDICINE

## 2019-10-30 PROCEDURE — G0438 PPPS, INITIAL VISIT: HCPCS | Performed by: INTERNAL MEDICINE

## 2019-10-30 PROCEDURE — G8484 FLU IMMUNIZE NO ADMIN: HCPCS | Performed by: INTERNAL MEDICINE

## 2019-10-30 ASSESSMENT — PATIENT HEALTH QUESTIONNAIRE - PHQ9: SUM OF ALL RESPONSES TO PHQ QUESTIONS 1-9: 17

## 2019-10-30 ASSESSMENT — LIFESTYLE VARIABLES
HOW MANY STANDARD DRINKS CONTAINING ALCOHOL DO YOU HAVE ON A TYPICAL DAY: 0
HOW OFTEN DURING THE LAST YEAR HAVE YOU FOUND THAT YOU WERE NOT ABLE TO STOP DRINKING ONCE YOU HAD STARTED: 0
HOW OFTEN DO YOU HAVE A DRINK CONTAINING ALCOHOL: 1
HAVE YOU OR SOMEONE ELSE BEEN INJURED AS A RESULT OF YOUR DRINKING: 0
AUDIT-C TOTAL SCORE: 1
HOW OFTEN DURING THE LAST YEAR HAVE YOU BEEN UNABLE TO REMEMBER WHAT HAPPENED THE NIGHT BEFORE BECAUSE YOU HAD BEEN DRINKING: 0
HOW OFTEN DO YOU HAVE SIX OR MORE DRINKS ON ONE OCCASION: 0
AUDIT TOTAL SCORE: 1
HAS A RELATIVE, FRIEND, DOCTOR, OR ANOTHER HEALTH PROFESSIONAL EXPRESSED CONCERN ABOUT YOUR DRINKING OR SUGGESTED YOU CUT DOWN: 0
HOW OFTEN DURING THE LAST YEAR HAVE YOU HAD A FEELING OF GUILT OR REMORSE AFTER DRINKING: 0
HOW OFTEN DURING THE LAST YEAR HAVE YOU NEEDED AN ALCOHOLIC DRINK FIRST THING IN THE MORNING TO GET YOURSELF GOING AFTER A NIGHT OF HEAVY DRINKING: 0
HOW OFTEN DURING THE LAST YEAR HAVE YOU FAILED TO DO WHAT WAS NORMALLY EXPECTED FROM YOU BECAUSE OF DRINKING: 0

## 2020-03-10 ENCOUNTER — TELEPHONE (OUTPATIENT)
Dept: INTERNAL MEDICINE CLINIC | Age: 70
End: 2020-03-10

## 2020-03-10 NOTE — TELEPHONE ENCOUNTER
Pt wants same day acute with pcp only, declined other providers and next avail with him was in May. She c/o cough x 3-4 wks and diarrhea x 3 days with no other sx.  Thanks   Wants to be fit in, thanks

## 2020-03-11 ENCOUNTER — OFFICE VISIT (OUTPATIENT)
Dept: INTERNAL MEDICINE CLINIC | Age: 70
End: 2020-03-11
Payer: MEDICARE

## 2020-03-11 VITALS
SYSTOLIC BLOOD PRESSURE: 130 MMHG | HEART RATE: 72 BPM | BODY MASS INDEX: 25.19 KG/M2 | DIASTOLIC BLOOD PRESSURE: 74 MMHG | WEIGHT: 129 LBS

## 2020-03-11 PROCEDURE — 1036F TOBACCO NON-USER: CPT | Performed by: INTERNAL MEDICINE

## 2020-03-11 PROCEDURE — 3023F SPIROM DOC REV: CPT | Performed by: INTERNAL MEDICINE

## 2020-03-11 PROCEDURE — G8427 DOCREV CUR MEDS BY ELIG CLIN: HCPCS | Performed by: INTERNAL MEDICINE

## 2020-03-11 PROCEDURE — 3046F HEMOGLOBIN A1C LEVEL >9.0%: CPT | Performed by: INTERNAL MEDICINE

## 2020-03-11 PROCEDURE — 99214 OFFICE O/P EST MOD 30 MIN: CPT | Performed by: INTERNAL MEDICINE

## 2020-03-11 PROCEDURE — 1090F PRES/ABSN URINE INCON ASSESS: CPT | Performed by: INTERNAL MEDICINE

## 2020-03-11 PROCEDURE — 4040F PNEUMOC VAC/ADMIN/RCVD: CPT | Performed by: INTERNAL MEDICINE

## 2020-03-11 PROCEDURE — G8417 CALC BMI ABV UP PARAM F/U: HCPCS | Performed by: INTERNAL MEDICINE

## 2020-03-11 PROCEDURE — G8399 PT W/DXA RESULTS DOCUMENT: HCPCS | Performed by: INTERNAL MEDICINE

## 2020-03-11 PROCEDURE — G8926 SPIRO NO PERF OR DOC: HCPCS | Performed by: INTERNAL MEDICINE

## 2020-03-11 PROCEDURE — G8484 FLU IMMUNIZE NO ADMIN: HCPCS | Performed by: INTERNAL MEDICINE

## 2020-03-11 PROCEDURE — 1123F ACP DISCUSS/DSCN MKR DOCD: CPT | Performed by: INTERNAL MEDICINE

## 2020-03-11 PROCEDURE — 3017F COLORECTAL CA SCREEN DOC REV: CPT | Performed by: INTERNAL MEDICINE

## 2020-03-11 PROCEDURE — 2022F DILAT RTA XM EVC RTNOPTHY: CPT | Performed by: INTERNAL MEDICINE

## 2020-03-11 RX ORDER — LEVOFLOXACIN 500 MG/1
500 TABLET, FILM COATED ORAL DAILY
Qty: 10 TABLET | Refills: 0 | Status: SHIPPED | OUTPATIENT
Start: 2020-03-11 | End: 2020-03-21

## 2020-03-11 RX ORDER — PREDNISONE 20 MG/1
20 TABLET ORAL DAILY
Qty: 7 TABLET | Refills: 0 | Status: SHIPPED | OUTPATIENT
Start: 2020-03-11 | End: 2020-03-18

## 2020-03-11 RX ORDER — DIPHENOXYLATE HYDROCHLORIDE AND ATROPINE SULFATE 2.5; .025 MG/1; MG/1
TABLET ORAL
Qty: 30 TABLET | Refills: 0 | Status: SHIPPED | OUTPATIENT
Start: 2020-03-11 | End: 2020-03-21

## 2020-03-11 ASSESSMENT — ENCOUNTER SYMPTOMS
VOMITING: 0
DIARRHEA: 1
NAUSEA: 1

## 2020-03-11 NOTE — PROGRESS NOTES
is normal. No respiratory distress. Breath sounds: Wheezing (Expiratory), rhonchi and rales present. Neurological:      Mental Status: She is alert and oriented to person, place, and time. Cranial Nerves: No cranial nerve deficit. Psychiatric:         Behavior: Behavior normal.         Thought Content:  Thought content normal.         Judgment: Judgment normal.           Current Outpatient Medications:     buPROPion (WELLBUTRIN SR) 150 MG extended release tablet, TAKE 1 TABLET BY MOUTH TWICE DAILY, Disp: 180 tablet, Rfl: 1    methIMAzole (TAPAZOLE) 10 MG tablet, TAKE 1 TABLET BY MOUTH THREE TIMES DAILY, Disp: 270 tablet, Rfl: 1    atorvastatin (LIPITOR) 20 MG tablet, TAKE 1 TABLET BY MOUTH EVERY DAY, Disp: 90 tablet, Rfl: 1    ACCU-CHEK SOFTCLIX LANCETS MISC, USE FOUR TIMES DAILY AS DIRECTED, Disp: 400 each, Rfl: 2    Glucose Blood (ACCU-CHEK IRA PLUS VI), 1 each by In Vitro route 4 times daily Indications: Please dispense Accu-Chek Ira Plus Test Strips with DX code E11.9 , Disp: , Rfl:     Blood Glucose Monitoring Suppl (ACCU-CHEK IRA PLUS) w/Device KIT, 1 each by Does not apply route daily Please dispense Accu-Chek Ira Plus Meter with DX code E11.9, Disp: 1 kit, Rfl: 0    metoprolol succinate (TOPROL XL) 50 MG extended release tablet, TAKE 1 TABLET BY MOUTH EVERY DAY, Disp: 90 tablet, Rfl: 1    fluticasone-vilanterol (BREO ELLIPTA) 100-25 MCG/INH AEPB inhaler, Inhale 1 puff into the lungs daily, Disp: 1 each, Rfl: 5    metFORMIN (GLUCOPHAGE) 1000 MG tablet, TAKE 1 TABLET BY MOUTH TWICE DAILY WITH MEALS, Disp: 180 tablet, Rfl: 3    gabapentin (NEURONTIN) 100 MG capsule, TAKE 1 CAPSULE BY MOUTH EVERY NIGHT AT BEDTIME (Patient taking differently: TAKE 1 CAPSULE BY MOUTH THREE TIMES DAILY), Disp: 90 capsule, Rfl: 0    aspirin 81 MG chewable tablet, Take 81 mg by mouth daily Indications: OTC, Disp: , Rfl:     albuterol sulfate  (90 Base) MCG/ACT inhaler, Inhale 2 puffs into the

## 2020-04-13 RX ORDER — ALPRAZOLAM 0.5 MG/1
0.5 TABLET ORAL 3 TIMES DAILY PRN
Qty: 90 TABLET | Refills: 1 | Status: SHIPPED | OUTPATIENT
Start: 2020-04-13 | End: 2020-07-12

## 2020-04-13 RX ORDER — ALPRAZOLAM 0.5 MG/1
TABLET ORAL
Qty: 90 TABLET | Refills: 2 | Status: SHIPPED | OUTPATIENT
Start: 2020-04-13 | End: 2020-04-13 | Stop reason: SDUPTHER

## 2020-04-13 RX ORDER — DIPHENOXYLATE HYDROCHLORIDE AND ATROPINE SULFATE 2.5; .025 MG/1; MG/1
TABLET ORAL
Qty: 30 TABLET | Refills: 0 | Status: SHIPPED | OUTPATIENT
Start: 2020-04-13 | End: 2020-05-13

## 2020-06-30 ENCOUNTER — HOSPITAL ENCOUNTER (OUTPATIENT)
Age: 70
Discharge: HOME | End: 2020-06-30
Payer: MEDICARE

## 2020-06-30 ENCOUNTER — HOSPITAL ENCOUNTER (OUTPATIENT)
Dept: CT IMAGING | Age: 70
Discharge: HOME | End: 2020-06-30
Payer: MEDICARE

## 2020-06-30 LAB
A/G RATIO: 1.2 (ref 1.1–2.2)
ALBUMIN SERPL-MCNC: 4.1 G/DL (ref 3.4–5)
ALP BLD-CCNC: 113 U/L (ref 40–129)
ALT SERPL-CCNC: 12 U/L (ref 10–40)
ANION GAP SERPL CALCULATED.3IONS-SCNC: 9 MMOL/L (ref 3–16)
AST SERPL-CCNC: 13 U/L (ref 15–37)
BILIRUB SERPL-MCNC: 0.4 MG/DL (ref 0–1)
BUN BLDV-MCNC: 12 MG/DL (ref 7–20)
CALCIUM SERPL-MCNC: 10.4 MG/DL (ref 8.3–10.6)
CHLORIDE BLD-SCNC: 104 MMOL/L (ref 99–110)
CO2: 27 MMOL/L (ref 21–32)
CREAT SERPL-MCNC: 0.8 MG/DL (ref 0.6–1.2)
GFR AFRICAN AMERICAN: >60
GFR NON-AFRICAN AMERICAN: >60
GLOBULIN: 3.4 G/DL
GLUCOSE BLD-MCNC: 161 MG/DL (ref 70–99)
POTASSIUM SERPL-SCNC: 4.6 MMOL/L (ref 3.5–5.1)
SODIUM BLD-SCNC: 140 MMOL/L (ref 136–145)
TOTAL PROTEIN: 7.5 G/DL (ref 6.4–8.2)

## 2020-06-30 PROCEDURE — 6360000004 HC RX CONTRAST MEDICATION: Performed by: NURSE PRACTITIONER

## 2020-06-30 PROCEDURE — 36415 COLL VENOUS BLD VENIPUNCTURE: CPT

## 2020-06-30 PROCEDURE — 80053 COMPREHEN METABOLIC PANEL: CPT

## 2020-06-30 PROCEDURE — 71260 CT THORAX DX C+: CPT

## 2020-06-30 RX ADMIN — IOPAMIDOL 75 ML: 755 INJECTION, SOLUTION INTRAVENOUS at 09:04

## 2020-07-16 RX ORDER — ALPRAZOLAM 0.5 MG/1
TABLET ORAL
Qty: 90 TABLET | Refills: 1 | Status: SHIPPED | OUTPATIENT
Start: 2020-07-16 | End: 2020-08-15

## 2020-10-05 ENCOUNTER — OFFICE VISIT (OUTPATIENT)
Dept: INTERNAL MEDICINE CLINIC | Age: 70
End: 2020-10-05
Payer: MEDICARE

## 2020-10-05 VITALS
DIASTOLIC BLOOD PRESSURE: 70 MMHG | OXYGEN SATURATION: 95 % | TEMPERATURE: 96.8 F | HEART RATE: 68 BPM | SYSTOLIC BLOOD PRESSURE: 128 MMHG | BODY MASS INDEX: 24.74 KG/M2 | HEIGHT: 60 IN | WEIGHT: 126 LBS

## 2020-10-05 PROBLEM — Z79.4 TYPE 2 DIABETES MELLITUS WITH HYPERGLYCEMIA, WITH LONG-TERM CURRENT USE OF INSULIN (HCC): Status: ACTIVE | Noted: 2019-04-24

## 2020-10-05 PROBLEM — E11.65 TYPE 2 DIABETES MELLITUS WITH HYPERGLYCEMIA, WITH LONG-TERM CURRENT USE OF INSULIN (HCC): Status: ACTIVE | Noted: 2019-04-24

## 2020-10-05 PROCEDURE — 3051F HG A1C>EQUAL 7.0%<8.0%: CPT | Performed by: INTERNAL MEDICINE

## 2020-10-05 PROCEDURE — 3017F COLORECTAL CA SCREEN DOC REV: CPT | Performed by: INTERNAL MEDICINE

## 2020-10-05 PROCEDURE — G0439 PPPS, SUBSEQ VISIT: HCPCS | Performed by: INTERNAL MEDICINE

## 2020-10-05 PROCEDURE — G8484 FLU IMMUNIZE NO ADMIN: HCPCS | Performed by: INTERNAL MEDICINE

## 2020-10-05 PROCEDURE — 4040F PNEUMOC VAC/ADMIN/RCVD: CPT | Performed by: INTERNAL MEDICINE

## 2020-10-05 PROCEDURE — 1123F ACP DISCUSS/DSCN MKR DOCD: CPT | Performed by: INTERNAL MEDICINE

## 2020-10-05 PROCEDURE — 99497 ADVNCD CARE PLAN 30 MIN: CPT | Performed by: INTERNAL MEDICINE

## 2020-10-05 PROCEDURE — G8431 POS CLIN DEPRES SCRN F/U DOC: HCPCS | Performed by: INTERNAL MEDICINE

## 2020-10-05 PROCEDURE — G0444 DEPRESSION SCREEN ANNUAL: HCPCS | Performed by: INTERNAL MEDICINE

## 2020-10-05 ASSESSMENT — PATIENT HEALTH QUESTIONNAIRE - PHQ9
SUM OF ALL RESPONSES TO PHQ9 QUESTIONS 1 & 2: 6
3. TROUBLE FALLING OR STAYING ASLEEP: 0
SUM OF ALL RESPONSES TO PHQ QUESTIONS 1-9: 18
5. POOR APPETITE OR OVEREATING: 3
7. TROUBLE CONCENTRATING ON THINGS, SUCH AS READING THE NEWSPAPER OR WATCHING TELEVISION: 3
2. FEELING DOWN, DEPRESSED OR HOPELESS: 3
1. LITTLE INTEREST OR PLEASURE IN DOING THINGS: 3
8. MOVING OR SPEAKING SO SLOWLY THAT OTHER PEOPLE COULD HAVE NOTICED. OR THE OPPOSITE, BEING SO FIGETY OR RESTLESS THAT YOU HAVE BEEN MOVING AROUND A LOT MORE THAN USUAL: 3
10. IF YOU CHECKED OFF ANY PROBLEMS, HOW DIFFICULT HAVE THESE PROBLEMS MADE IT FOR YOU TO DO YOUR WORK, TAKE CARE OF THINGS AT HOME, OR GET ALONG WITH OTHER PEOPLE: 3
6. FEELING BAD ABOUT YOURSELF - OR THAT YOU ARE A FAILURE OR HAVE LET YOURSELF OR YOUR FAMILY DOWN: 0
9. THOUGHTS THAT YOU WOULD BE BETTER OFF DEAD, OR OF HURTING YOURSELF: 0
SUM OF ALL RESPONSES TO PHQ QUESTIONS 1-9: 18
4. FEELING TIRED OR HAVING LITTLE ENERGY: 3

## 2020-10-05 ASSESSMENT — COLUMBIA-SUICIDE SEVERITY RATING SCALE - C-SSRS
6. HAVE YOU EVER DONE ANYTHING, STARTED TO DO ANYTHING, OR PREPARED TO DO ANYTHING TO END YOUR LIFE?: NO
1. WITHIN THE PAST MONTH, HAVE YOU WISHED YOU WERE DEAD OR WISHED YOU COULD GO TO SLEEP AND NOT WAKE UP?: NO
2. HAVE YOU ACTUALLY HAD ANY THOUGHTS OF KILLING YOURSELF?: NO

## 2020-10-05 ASSESSMENT — LIFESTYLE VARIABLES: HOW OFTEN DO YOU HAVE A DRINK CONTAINING ALCOHOL: 0

## 2020-10-05 NOTE — PATIENT INSTRUCTIONS
Personalized Preventive Plan for Harleen Mccarthy - 10/5/2020  Medicare offers a range of preventive health benefits. Some of the tests and screenings are paid in full while other may be subject to a deductible, co-insurance, and/or copay. Some of these benefits include a comprehensive review of your medical history including lifestyle, illnesses that may run in your family, and various assessments and screenings as appropriate. After reviewing your medical record and screening and assessments performed today your provider may have ordered immunizations, labs, imaging, and/or referrals for you. A list of these orders (if applicable) as well as your Preventive Care list are included within your After Visit Summary for your review. Other Preventive Recommendations:    · A preventive eye exam performed by an eye specialist is recommended every 1-2 years to screen for glaucoma; cataracts, macular degeneration, and other eye disorders. · A preventive dental visit is recommended every 6 months. · Try to get at least 150 minutes of exercise per week or 10,000 steps per day on a pedometer . · Order or download the FREE \"Exercise & Physical Activity: Your Everyday Guide\" from [employer]. Call 4-768.576.5613 or search [employer] online. · You need 9223-8618 mg of calcium and 2462-3881 IU of vitamin D per day. It is possible to meet your calcium requirement with diet alone, but a vitamin D supplement is usually necessary to meet this goal.  · When exposed to the sun, use a sunscreen that protects against both UVA and UVB radiation with an SPF of 30 or greater. Reapply every 2 to 3 hours or after sweating, drying off with a towel, or swimming. · Always wear a seat belt when traveling in a car. Always wear a helmet when riding a bicycle or motorcycle. Personalized Preventive Plan for Harleen Mccarthy - 10/5/2020  Medicare offers a range of preventive health benefits. Some of the tests and screenings are paid in full while other may be subject to a deductible, co-insurance, and/or copay. Some of these benefits include a comprehensive review of your medical history including lifestyle, illnesses that may run in your family, and various assessments and screenings as appropriate. After reviewing your medical record and screening and assessments performed today your provider may have ordered immunizations, labs, imaging, and/or referrals for you. A list of these orders (if applicable) as well as your Preventive Care list are included within your After Visit Summary for your review. Other Preventive Recommendations:    A preventive eye exam performed by an eye specialist is recommended every 1-2 years to screen for glaucoma; cataracts, macular degeneration, and other eye disorders. A preventive dental visit is recommended every 6 months. Try to get at least 150 minutes of exercise per week or 10,000 steps per day on a pedometer . Order or download the FREE \"Exercise & Physical Activity: Your Everyday Guide\" from [employer]. Call 1-353.477.9130 or search [employer] online. You need 8806-4513 mg of calcium and 4925-9800 IU of vitamin D per day. It is possible to meet your calcium requirement with diet alone, but a vitamin D supplement is usually necessary to meet this goal.  When exposed to the sun, use a sunscreen that protects against both UVA and UVB radiation with an SPF of 30 or greater. Reapply every 2 to 3 hours or after sweating, drying off with a towel, or swimming. Always wear a seat belt when traveling in a car. Always wear a helmet when riding a bicycle or motorcycle.

## 2020-10-05 NOTE — PROGRESS NOTES
Medicare Annual Wellness Visit  Name: Alina Magallanes Date: 10/5/2020   MRN: [id number] Sex: Female   Age: 69 y.o. Ethnicity: Non-/Non    : 1950 Race: Anny Alvarado is here for Medicare AWV    Screenings for behavioral, psychosocial and functional/safety risks, and cognitive dysfunction are all negative except as indicated below. These results, as well as other patient data from the [address] form, are documented in Flowsheets linked to this Encounter. PHQ Scores 10/5/2020 10/30/2019 2018 3/16/2017 2015 2014 2013   PHQ2 Score 6 5 0 2 2 2 -   PHQ9 Score 18 17 0 5 2 2 9     Interpretation of Total Score Depression Severity: 1-4 = Minimal depression, 5-9 = Mild depression, 10-14 = Moderate depression, 15-19 = Moderately severe depression, 20-27 = Severe depression    Allergies   Allergen Reactions    Codeine Anxiety       Prior to Visit Medications    Medication Sig Taking?  Authorizing Provider   ACCU-CHEK ANA LAURA PLUS strip TEST FOUR TIMES DAILY Yes Judy Hodgson MD   metFORMIN (GLUCOPHAGE) 1000 MG tablet TAKE 1 TABLET BY MOUTH TWICE DAILY WITH MEALS Yes Judy Hodgson MD   metoprolol succinate (TOPROL XL) 50 MG extended release tablet TAKE 1 TABLET BY MOUTH EVERY DAY Yes Judy Hodgson MD   methIMAzole (TAPAZOLE) 10 MG tablet Take 0.5 tablets by mouth 3 times daily Yes Judy Hodgson MD   buPROPion [facility]) 150 MG extended release tablet TAKE 1 TABLET BY MOUTH TWICE DAILY Yes Judy Hodgson MD   atorvastatin (LIPITOR) 20 MG tablet TAKE 1 TABLET BY MOUTH EVERY DAY Yes Judy Hodgson MD   ACCU-CHEK SOFTCLIX LANCETS MISC USE FOUR TIMES DAILY AS DIRECTED Yes Judy Hodgson MD   Glucose Blood (ACCU-CHEK ANA LAURA PLUS VI) 1 each by In Vitro route 4 times daily Indications: Please dispense Accu-Chek Ana Laura Plus Test Strips with DX code E11.9  Yes Historical Provider, MD   Blood Glucose Monitoring Suppl (ACCU-CHEK ANA LAURA PLUS) w/Device KIT 1 each by Does not apply route daily Please dispense Accu-Chek Ana Laura Plus Meter with DX code E11.9 Yes Cecy Parnell MD   fluticasone-vilanterol (BREO ELLIPTA) 100-25 MCG/INH AEPB inhaler Inhale 1 puff into the lungs daily Yes Cecy Parnell MD   gabapentin (NEURONTIN) 100 MG capsule TAKE 1 CAPSULE BY MOUTH EVERY NIGHT AT BEDTIME  Patient taking differently: TAKE 1 CAPSULE BY MOUTH THREE TIMES DAILY Yes Rebeca An MD   aspirin 81 MG chewable tablet Take 81 mg by mouth daily Indications: OTC Yes Historical Provider, MD   albuterol sulfate  (90 Base) MCG/ACT inhaler Inhale 2 puffs into the lungs every 6 hours as needed for Wheezing  Kitty Mcghee MD       Past Medical History:   Diagnosis Date    Antineoplastic chemotherapy induced anemia 11/12/2015    Anxiety     Benign essential hypertension     Cervical radiculitis 11/3/2017    Cervicalgia 11/3/2017    Chemotherapy induced nausea and vomiting 9/24/2015    Chronic fatigue syndrome     Chronic obstructive pulmonary disease (COPD) ([address]     Contusion of left foot 3/8/2017    COPD, moderate ([address] 6/1/2016    INTERPRETATION: Spirometry showed decreased FVC of 1.86 L, 77% predicted and decreased  FEV-1 of 1.16 L, 60% predicted. FEV-1/FVC ratio was decreased at 63%. No response to bronchodilators demonstrated on spirometry. Lung volumes showed increased total lung capacity of 141% predicted and residual volume of 241% predicted. Diffusion capacity showed  decreased DLCO of 66% predicted.   IMPRESSION: Mod    Diabetes mellitus type II, controlled ([address]     Encounter for chemotherapy management 10/8/2015    Hearing disorder, sensorineural 2/23/2017    Hyperlipidemia     Hyperthyroidism     Iron deficiency anemia     Left foot pain 2/22/2017    Lung cancer ([address]     Lung mass     R lung mass    Malignant neoplasm of upper lobe of right lung ([address] 5/5/2015    Myofascial pain on right side 10/1/2015    Neoplasm related pain     Primary osteoarthritis of right hip 3/8/2017    Rotator cuff tendinitis 11/3/2017    Tinnitus 2/23/2017    Tobacco user     quit smoking in jan, 2015    Uncontrolled type 2 diabetes mellitus without complication, without long-term current use of insulin 3/11/2016       Past Surgical History:   Procedure Laterality Date    BLADDER REPAIR  1972    tuck    HYSTERECTOMY  1972    THORACOTOMY Right 6/17/2015    Dr. Joel Pena - via VATS w/RUL for adenocarcinoma    TUNNELED VENOUS PORT PLACEMENT Left 7/17/15    Dr. Joel Pena  Shelby Senna power injectable port - 8F (max 300psi; MRI conditional 3-Bhakti)       Family History   Problem Relation Age of Onset    Diabetes Father         & TB history    Hypertension Father     Diabetes Sister     Diabetes Brother     Stroke Son     Osteoporosis Mother         & Angina    Anesth Problems Neg Hx     Malig Hyperten Neg Hx     Hypotension Neg Hx     Malig Hypertherm Neg Hx     Pseudochol. Deficiency Neg Hx        CareTeam (Including outside providers/suppliers regularly involved in providing care):   Patient Care Team:  Declan Sutherland MD as PCP - Miguel Lakhani MD as PCP - Hematology/Oncology (Hematology and Oncology)  Declan Sutherland MD as PCP - REHABILITATION [facility] Empaneled Provider    Wt Readings from Last 3 Encounters:   10/05/20 126 lb (57.2 kg)   03/11/20 129 lb (58.5 kg)   10/30/19 130 lb (59 kg)     Vitals:    10/05/20 0950   BP: 128/70   Pulse: 68   Temp: 96.8 °F (36 °C)   TempSrc: Infrared   SpO2: 95%   Weight: 126 lb (57.2 kg)   Height: 5' (1.524 m)     Body mass index is 24.61 kg/m². Based upon direct observation of the patient, evaluation of cognition reveals recent and remote memory intact. Physical Exam  Vitals signs and nursing note reviewed. Constitutional:       General: She is not in acute distress. Appearance: She is well-developed. Cardiovascular:      Rate and Rhythm: Normal rate and regular rhythm.       Heart sounds: Normal heart sounds. No murmur. No friction rub. No gallop. Pulmonary:      Effort: Pulmonary effort is normal. No respiratory distress. Breath sounds: Normal breath sounds. No wheezing or rales. Chest:      Chest wall: No tenderness. Musculoskeletal:      Comments: No calluses or open lesions   Skin:     General: Skin is warm. Findings: No erythema or rash. Neurological:      Mental Status: She is alert and oriented to person, place, and time. Comments: Normal sensation to microfilament bilaterally. Psychiatric:         Behavior: Behavior normal.         Thought Content: Thought content normal.         Judgment: Judgment normal.           Patient's complete Health Risk Assessment and screening values have been reviewed and are found in Flowsheets. The following problems were reviewed today and where indicated follow up appointments were made and/or referrals ordered. Positive Risk Factor Screenings with Interventions:     Depression:  PHQ-2 Score: 6  PHQ-9 Total Score: 18    Severity:1-4 = minimal depression, 5-9 = mild depression, 10-14 = moderate depression, 15-19 = moderately severe depression, 20-27 = severe depression  Depression Interventions:  · reactive depression related to the death of her grandson recently    General Health:  General  In general, how would you say your health is?: Fair  Do you get the social and emotional support that you need?: Yes  Do you have a Living Will?: (!) No  General Health Risk Interventions:  · No Living Will: provided the state-specific advance directive document to the patient    Health Habits/Nutrition:  Health Habits/Nutrition  Do you exercise for at least 20 minutes 2-3 times per week?: (!) No  Have you lost any weight without trying in the past 3 months?: No  Do you eat fewer than 2 meals per day?: No  Have you seen a dentist within the past year?: (!) No  Body mass index is 24.61 kg/m².   Health Habits/Nutrition Interventions:  · Inadequate physical activity:  patient is not ready to increase his/her physical activity level at this time  · Dental exam overdue:  she is edentulous    Hearing/Vision:  No exam data present  Hearing/Vision  Do you or your family notice any trouble with your hearing?: (!) Yes  Do you have difficulty driving, watching TV, or doing any of your daily activities because of your eyesight?: (!) Yes  Have you had an eye exam within the past year?: Yes  Hearing/Vision Interventions:    Patient has tinnitus that impedes her hearing  · Vision concerns:  She gets better with pulling up her eyelids, ophthalmology/optometry referral provided    Safety:  Safety  Do you have working smoke detectors?: Yes  Have all throw rugs been removed or fastened?: Yes  Do you have non-slip mats or surfaces in all bathtubs/showers?: Yes  Do all of your stairways have a railing or banister?: (!) No  Are your doorways, halls and stairs free of clutter?: Yes  Do you always fasten your seatbelt when you are in a car?: Yes  Safety Interventions:  · She has a 1 floor home.      Personalized Preventive Plan   Current Health Maintenance Status  Immunization History   Administered Date(s) Administered    PPD Test 03/03/2008    Tdap (Boostrix, Adacel) 04/29/2013        Health Maintenance   Topic Date Due    Shingles Vaccine (1 of 2) 04/20/1999    Pneumococcal 65+ years Vaccine (1 of 1 - PPSV23) 04/20/2014    Breast cancer screen  02/06/2017    Colon cancer screen colonoscopy  11/19/2019    Flu vaccine (1) 09/01/2020    Annual Wellness Visit (AWV)  10/30/2020    Statin Therapy  12/11/2020    Diabetic foot exam  03/11/2021    A1C test (Diabetic or Prediabetic)  03/11/2021    Diabetic microalbuminuria test  03/11/2021    Lipid screen  03/11/2021    TSH testing  03/11/2021    Potassium monitoring  06/30/2021    Creatinine monitoring  06/30/2021    Low dose CT lung screening  06/30/2021    Diabetic retinal exam  06/29/2022  DTaP/Tdap/Td vaccine (2 - Td) 04/29/2023    DEXA (modify frequency per FRAX score)  Completed    Hepatitis C screen  Completed    Hepatitis A vaccine  Aged Out    Hib vaccine  Aged Out    Meningococcal (ACWY) vaccine  Aged Out     Recommendations for [employer] Due: see orders and patient instructions/AVS.  . Recommended screening schedule for the next 5-10 years is provided to the patient in written form: see Patient Rory Zamora was seen today for medicare aw.     Diagnoses and all orders for this visit:    Positive depression screening  -     Positive Screen for Clinical Depression with a Documented Follow-up Plan   -     [place] ADVANCED CARE PLAN FACE TO FACE, 1ST 30MIN [[id number]]    Controlled type 2 diabetes mellitus without complication, without long-term current use of insulin ([address]  -     [place] ADVANCED CARE PLAN FACE TO FACE, 1ST 30MIN [[id number]]    Type 2 diabetes mellitus with hyperglycemia, with long-term current use of insulin ([address]  -     [place] ADVANCED CARE PLAN FACE TO [address], 1ST 30MIN [id number]    Malignant neoplasm of middle lobe of right lung ([address]  -     [place] ADVANCED CARE PLAN FACE TO FACE, 1ST 30MIN [[id number]]    COPD, moderate ([address]  -     [place] ADVANCED CARE PLAN FACE TO FACE, 1ST 30MIN [id number]    Hyperthyroidism  -     [place] ADVANCED CARE PLAN FACE TO [address], 1ST 30MIN [id number]    Benign essential hypertension  -     [place] ADVANCED CARE PLAN FACE TO [address], 1ST 30MIN [id number]    Routine general medical examination at a health care facility  -     [address] TO [address], [address] [[id number]]    ACP (advance care planning)  -     [place] ADVANCED CARE PLAN FACE TO [address], [address] [[id number]]        Advance Care Planning     Advance Care Planning (ACP) Physician/NP/PA (Provider) Conversation      Date of ACP Conversation: 10/5/2020    Conversation Conducted with:   Patient with Decision Making [address] Maker:    Current Designated Health Care Decision Maker:      Note: Assess and validate information in current ACP documents, as indicated. If no Authorized Decision Maker has previously been identified, then patient chooses [employer]:  \"Who would you like to name as your primary health care decision-maker? \"             Name: Papo Hartman        Relationship: Son         Phone number: [phone]  Philippe Contreras this person be reached easily? \" Yes  \"Who would you like to name as your back-up decision maker? \"   Name: Iqra Cason        Relationship: daughter         Phone number: [phone]  Philippe Contreras this person be reached easily? \" Yes    Note: If the relationship of these Decision-Makers to the patient does NOT follow your state's Next of Kin hierarchy, recommend that patient complete ACP document that meets state-specific requirements to allow them to act on the patient's behalf when appropriate. Care Preferences:    Hospitalization: \"If your health worsens and it becomes clear that your chance of recovery is unlikely, what would your preference be regarding hospitalization? \"  If the patient would want hospitalization, answer \"yes\". If the patient would prefer comfort-focused treatment without hospitalization, answer \"no\". YES/NO/WILD CARDS: yes      Ventilation: \"If you were in your present state of health and suddenly became very ill and were unable to breathe on your own, what would your preference be about the use of a ventilator (breathing machine) if it was available to you? \"    If patient would desire the use of a ventilator (breathing machine), answer \"yes\", if not answer \"no\":yes    \"If your health worsens and it becomes clear that your chance of recovery is unlikely, what would your preference be about the use of a ventilator (breathing machine) if it was available to you? \"   no    Resuscitation:  \"CPR works best to restart the heart when there is a sudden event, like a heart attack, in someone who is otherwise healthy.  Unfortunately, CPR does not typically restart the heart for people who have serious health conditions or who are very sick. \"    \"In the event your heart stopped as a result of an underlying serious health condition, would you want attempts to be made to restart your heart (answer \"yes\" for attempt to resuscitate) or would you prefer a natural death (answer \"no\" for do not attempt to resuscitate)? \"   no     NOTE: If the patient has a valid advance directive AND provides care preference(s) that are inconsistent with that prior directive, advise the patient to consider either: creating a new advance directive that complies with state-specific requirements; or, if that is not possible, orally revoking that prior directive in accordance with state-specific requirements, which must be documented in the EHR.     Conversation Outcomes / Follow-Up Plan:   Recommended completion of Advance Directive      Length of Voluntary ACP Conversation in minutes:  25 minutes    Lizy Michaud

## 2020-10-09 ENCOUNTER — NURSE TRIAGE (OUTPATIENT)
Dept: OTHER | Facility: CLINIC | Age: 70
End: 2020-10-09

## 2020-10-09 PROBLEM — J96.00 ACUTE RESPIRATORY FAILURE (HCC): Status: ACTIVE | Noted: 2020-10-09

## 2020-10-09 NOTE — TELEPHONE ENCOUNTER
I called the patient and her oxygen saturation is down to 87% (up from 60%) and she had two episodes of syncope this week since her visit with me on Monday.      Mercy Araya MD  FACP
Not asked    11. TRAVEL: \"Have you traveled out of the country in the last month? \" (e.g., travel history, exposures)        Denies    Protocols used: BREATHING DIFFICULTY-ADULT-[place]    Patient called pre-service center [facility]) Kelli with red flag complaint. Brief description of triage: Pt's daughter calls in reporting pt's oxygen level was 68% yesterday (now 87%), pt has been having spells of severe SOB and breathing is currently slow and shallow. Pt has also been falling asleep frequently, even during conversation. Triage indicates for patient to call  now, and pt agreeable. Care advice provided, patient verbalizes understanding; denies any other questions or concerns; instructed to call back for any new or worsening symptoms. Attention Provider: Thank you for allowing me to participate in the care of your patient. The patient was connected to triage in response to information provided to the [facility]. Please do not respond through this encounter as the response is not directed to a shared pool.

## 2020-10-10 ENCOUNTER — HOSPITAL ENCOUNTER (INPATIENT)
Age: 70
LOS: 2 days | Discharge: HOME | DRG: 177 | End: 2020-10-12
Attending: FAMILY MEDICINE | Admitting: HOSPITALIST
Payer: MEDICARE

## 2020-10-10 PROBLEM — U07.1 COVID-19: Status: ACTIVE | Noted: 2020-10-10

## 2020-10-10 PROBLEM — E11.69 TYPE 2 DIABETES MELLITUS WITH OTHER SPECIFIED COMPLICATION (HCC): Status: ACTIVE | Noted: 2019-04-24

## 2020-10-10 LAB
A/G RATIO: 1.2 (ref 1.1–2.2)
A/G RATIO: 1.3 (ref 1.1–2.2)
ABO/RH: NORMAL
ALBUMIN SERPL-MCNC: 3.8 G/DL (ref 3.4–5)
ALBUMIN SERPL-MCNC: 4 G/DL (ref 3.4–5)
ALP BLD-CCNC: 109 U/L (ref 40–129)
ALP BLD-CCNC: 116 U/L (ref 40–129)
ALT SERPL-CCNC: 12 U/L (ref 10–40)
ALT SERPL-CCNC: 13 U/L (ref 10–40)
ANION GAP SERPL CALCULATED.3IONS-SCNC: 11 MMOL/L (ref 3–16)
ANION GAP SERPL CALCULATED.3IONS-SCNC: 9 MMOL/L (ref 3–16)
ANTIBODY SCREEN: NORMAL
APTT: 28.8 SEC (ref 24.2–36.2)
APTT: 28.8 SEC (ref 24.2–36.2)
AST SERPL-CCNC: 20 U/L (ref 15–37)
AST SERPL-CCNC: 20 U/L (ref 15–37)
BASOPHILS ABSOLUTE: 0 K/UL (ref 0–0.2)
BASOPHILS ABSOLUTE: 0 K/UL (ref 0–0.2)
BASOPHILS RELATIVE PERCENT: 0.1 %
BASOPHILS RELATIVE PERCENT: 0.3 %
BILIRUB SERPL-MCNC: 0.3 MG/DL (ref 0–1)
BILIRUB SERPL-MCNC: 0.3 MG/DL (ref 0–1)
BLOOD BANK DISPENSE STATUS: NORMAL
BLOOD BANK DISPENSE STATUS: NORMAL
BLOOD BANK PRODUCT CODE: NORMAL
BLOOD BANK PRODUCT CODE: NORMAL
BPU ID: NORMAL
BPU ID: NORMAL
BUN BLDV-MCNC: 11 MG/DL (ref 7–20)
BUN BLDV-MCNC: 13 MG/DL (ref 7–20)
C-REACTIVE PROTEIN: 5.7 MG/L (ref 0–5.1)
CALCIUM SERPL-MCNC: 9.2 MG/DL (ref 8.3–10.6)
CALCIUM SERPL-MCNC: 9.3 MG/DL (ref 8.3–10.6)
CHLORIDE BLD-SCNC: 99 MMOL/L (ref 99–110)
CHLORIDE BLD-SCNC: 99 MMOL/L (ref 99–110)
CO2: 28 MMOL/L (ref 21–32)
CO2: 31 MMOL/L (ref 21–32)
CREAT SERPL-MCNC: 0.7 MG/DL (ref 0.6–1.2)
CREAT SERPL-MCNC: 0.8 MG/DL (ref 0.6–1.2)
D DIMER: 300 NG/ML DDU (ref 0–229)
D DIMER: 305 NG/ML DDU (ref 0–229)
DESCRIPTION BLOOD BANK: NORMAL
DESCRIPTION BLOOD BANK: NORMAL
EOSINOPHILS ABSOLUTE: 0 K/UL (ref 0–0.6)
EOSINOPHILS ABSOLUTE: 0 K/UL (ref 0–0.6)
EOSINOPHILS RELATIVE PERCENT: 0 %
EOSINOPHILS RELATIVE PERCENT: 0 %
FERRITIN: 140.2 NG/ML (ref 15–150)
FIBRINOGEN: 252 MG/DL (ref 200–397)
GFR AFRICAN AMERICAN: >60
GFR AFRICAN AMERICAN: >60
GFR NON-AFRICAN AMERICAN: >60
GFR NON-AFRICAN AMERICAN: >60
GLOBULIN: 3 G/DL
GLOBULIN: 3.2 G/DL
GLUCOSE BLD-MCNC: 143 MG/DL (ref 70–99)
GLUCOSE BLD-MCNC: 143 MG/DL (ref 70–99)
GLUCOSE BLD-MCNC: 162 MG/DL (ref 70–99)
GLUCOSE BLD-MCNC: 197 MG/DL (ref 70–99)
GLUCOSE BLD-MCNC: 213 MG/DL (ref 70–99)
GLUCOSE BLD-MCNC: 213 MG/DL (ref 70–99)
GLUCOSE BLD-MCNC: 85 MG/DL (ref 70–99)
HCT VFR BLD CALC: 37.7 % (ref 36–48)
HCT VFR BLD CALC: 38.9 % (ref 36–48)
HEMOGLOBIN: 12.3 G/DL (ref 12–16)
HEMOGLOBIN: 12.5 G/DL (ref 12–16)
INR BLD: 0.97 (ref 0.86–1.14)
INR BLD: 1 (ref 0.86–1.14)
LACTATE DEHYDROGENASE: 213 U/L (ref 100–190)
LYMPHOCYTES ABSOLUTE: 0.5 K/UL (ref 1–5.1)
LYMPHOCYTES ABSOLUTE: 0.6 K/UL (ref 1–5.1)
LYMPHOCYTES RELATIVE PERCENT: 12.8 %
LYMPHOCYTES RELATIVE PERCENT: 13.6 %
MCH RBC QN AUTO: 28.6 PG (ref 26–34)
MCH RBC QN AUTO: 29.1 PG (ref 26–34)
MCHC RBC AUTO-ENTMCNC: 32.3 G/DL (ref 31–36)
MCHC RBC AUTO-ENTMCNC: 32.6 G/DL (ref 31–36)
MCV RBC AUTO: 88.5 FL (ref 80–100)
MCV RBC AUTO: 89.2 FL (ref 80–100)
MONOCYTES ABSOLUTE: 0.1 K/UL (ref 0–1.3)
MONOCYTES ABSOLUTE: 0.3 K/UL (ref 0–1.3)
MONOCYTES RELATIVE PERCENT: 3 %
MONOCYTES RELATIVE PERCENT: 6.2 %
NEUTROPHILS ABSOLUTE: 3.4 K/UL (ref 1.7–7.7)
NEUTROPHILS ABSOLUTE: 3.7 K/UL (ref 1.7–7.7)
NEUTROPHILS RELATIVE PERCENT: 80.1 %
NEUTROPHILS RELATIVE PERCENT: 83.9 %
PDW BLD-RTO: 14.8 % (ref 12.4–15.4)
PDW BLD-RTO: 15.1 % (ref 12.4–15.4)
PERFORMED ON: ABNORMAL
PERFORMED ON: NORMAL
PLATELET # BLD: 195 K/UL (ref 135–450)
PLATELET # BLD: 204 K/UL (ref 135–450)
PMV BLD AUTO: 9 FL (ref 5–10.5)
PMV BLD AUTO: 9.1 FL (ref 5–10.5)
POTASSIUM REFLEX MAGNESIUM: 4 MMOL/L (ref 3.5–5.1)
POTASSIUM REFLEX MAGNESIUM: 4.3 MMOL/L (ref 3.5–5.1)
PROCALCITONIN: 0.05 NG/ML (ref 0–0.15)
PROTHROMBIN TIME: 11.3 SEC (ref 10–13.2)
PROTHROMBIN TIME: 11.6 SEC (ref 10–13.2)
RBC # BLD: 4.22 M/UL (ref 4–5.2)
RBC # BLD: 4.39 M/UL (ref 4–5.2)
SODIUM BLD-SCNC: 138 MMOL/L (ref 136–145)
SODIUM BLD-SCNC: 139 MMOL/L (ref 136–145)
TOTAL CK: 121 U/L (ref 26–192)
TOTAL PROTEIN: 7 G/DL (ref 6.4–8.2)
TOTAL PROTEIN: 7 G/DL (ref 6.4–8.2)
TSH REFLEX FT4: 1.49 UIU/ML (ref 0.27–4.2)
WBC # BLD: 4 K/UL (ref 4–11)
WBC # BLD: 4.6 K/UL (ref 4–11)

## 2020-10-10 PROCEDURE — 86900 BLOOD TYPING SEROLOGIC ABO: CPT

## 2020-10-10 PROCEDURE — XW13325 TRANSFUSION OF CONVALESCENT PLASMA (NONAUTOLOGOUS) INTO PERIPHERAL VEIN, PERCUTANEOUS APPROACH, NEW TECHNOLOGY GROUP 5: ICD-10-PCS | Performed by: INTERNAL MEDICINE

## 2020-10-10 PROCEDURE — 99223 1ST HOSP IP/OBS HIGH 75: CPT | Performed by: INTERNAL MEDICINE

## 2020-10-10 PROCEDURE — 86901 BLOOD TYPING SEROLOGIC RH(D): CPT

## 2020-10-10 PROCEDURE — 6370000000 HC RX 637 (ALT 250 FOR IP): Performed by: HOSPITALIST

## 2020-10-10 PROCEDURE — 2580000003 HC RX 258

## 2020-10-10 PROCEDURE — 2500000003 HC RX 250 WO HCPCS: Performed by: INTERNAL MEDICINE

## 2020-10-10 PROCEDURE — XW033E5 INTRODUCTION OF REMDESIVIR ANTI-INFECTIVE INTO PERIPHERAL VEIN, PERCUTANEOUS APPROACH, NEW TECHNOLOGY GROUP 5: ICD-10-PCS | Performed by: INTERNAL MEDICINE

## 2020-10-10 PROCEDURE — 86850 RBC ANTIBODY SCREEN: CPT

## 2020-10-10 PROCEDURE — 2580000003 HC RX 258: Performed by: HOSPITALIST

## 2020-10-10 PROCEDURE — 85384 FIBRINOGEN ACTIVITY: CPT

## 2020-10-10 PROCEDURE — 82728 ASSAY OF FERRITIN: CPT

## 2020-10-10 PROCEDURE — 99222 1ST HOSP IP/OBS MODERATE 55: CPT | Performed by: INTERNAL MEDICINE

## 2020-10-10 PROCEDURE — 2580000003 HC RX 258: Performed by: INTERNAL MEDICINE

## 2020-10-10 PROCEDURE — 6370000000 HC RX 637 (ALT 250 FOR IP): Performed by: INTERNAL MEDICINE

## 2020-10-10 PROCEDURE — 85025 COMPLETE CBC W/AUTO DIFF WBC: CPT

## 2020-10-10 PROCEDURE — 6360000002 HC RX W HCPCS: Performed by: HOSPITALIST

## 2020-10-10 PROCEDURE — 83036 HEMOGLOBIN GLYCOSYLATED A1C: CPT

## 2020-10-10 PROCEDURE — 2060000000 HC ICU INTERMEDIATE R&B

## 2020-10-10 PROCEDURE — 87449 NOS EACH ORGANISM AG IA: CPT

## 2020-10-10 PROCEDURE — 85610 PROTHROMBIN TIME: CPT

## 2020-10-10 PROCEDURE — 1200000000 HC SEMI PRIVATE

## 2020-10-10 PROCEDURE — 82550 ASSAY OF CK (CPK): CPT

## 2020-10-10 PROCEDURE — 36415 COLL VENOUS BLD VENIPUNCTURE: CPT

## 2020-10-10 PROCEDURE — 87081 CULTURE SCREEN ONLY: CPT

## 2020-10-10 PROCEDURE — U0003 INFECTIOUS AGENT DETECTION BY NUCLEIC ACID (DNA OR RNA); SEVERE ACUTE RESPIRATORY SYNDROME CORONAVIRUS 2 (SARS-COV-2) (CORONAVIRUS DISEASE [COVID-19]), AMPLIFIED PROBE TECHNIQUE, MAKING USE OF HIGH THROUGHPUT TECHNOLOGIES AS DESCRIBED BY CMS-2020-01-R: HCPCS

## 2020-10-10 PROCEDURE — 84443 ASSAY THYROID STIM HORMONE: CPT

## 2020-10-10 PROCEDURE — 80053 COMPREHEN METABOLIC PANEL: CPT

## 2020-10-10 PROCEDURE — 86140 C-REACTIVE PROTEIN: CPT

## 2020-10-10 PROCEDURE — 84145 PROCALCITONIN (PCT): CPT

## 2020-10-10 PROCEDURE — 85379 FIBRIN DEGRADATION QUANT: CPT

## 2020-10-10 PROCEDURE — 83615 LACTATE (LD) (LDH) ENZYME: CPT

## 2020-10-10 PROCEDURE — 85730 THROMBOPLASTIN TIME PARTIAL: CPT

## 2020-10-10 PROCEDURE — 87040 BLOOD CULTURE FOR BACTERIA: CPT

## 2020-10-10 RX ORDER — LANOLIN ALCOHOL/MO/W.PET/CERES
3 CREAM (GRAM) TOPICAL NIGHTLY PRN
COMMUNITY

## 2020-10-10 RX ORDER — ONDANSETRON 2 MG/ML
4 INJECTION INTRAMUSCULAR; INTRAVENOUS EVERY 6 HOURS PRN
Status: DISCONTINUED | OUTPATIENT
Start: 2020-10-10 | End: 2020-10-12 | Stop reason: HOSPADM

## 2020-10-10 RX ORDER — BUPROPION HYDROCHLORIDE 150 MG/1
150 TABLET, EXTENDED RELEASE ORAL 2 TIMES DAILY
Status: DISCONTINUED | OUTPATIENT
Start: 2020-10-10 | End: 2020-10-12 | Stop reason: HOSPADM

## 2020-10-10 RX ORDER — PROMETHAZINE HYDROCHLORIDE 25 MG/1
12.5 TABLET ORAL EVERY 6 HOURS PRN
Status: DISCONTINUED | OUTPATIENT
Start: 2020-10-10 | End: 2020-10-12 | Stop reason: HOSPADM

## 2020-10-10 RX ORDER — INSULIN LISPRO 100 [IU]/ML
0-6 INJECTION, SOLUTION INTRAVENOUS; SUBCUTANEOUS NIGHTLY
Status: DISCONTINUED | OUTPATIENT
Start: 2020-10-10 | End: 2020-10-12 | Stop reason: HOSPADM

## 2020-10-10 RX ORDER — HYDRALAZINE HYDROCHLORIDE 20 MG/ML
10 INJECTION INTRAMUSCULAR; INTRAVENOUS EVERY 6 HOURS PRN
Status: DISCONTINUED | OUTPATIENT
Start: 2020-10-10 | End: 2020-10-12 | Stop reason: HOSPADM

## 2020-10-10 RX ORDER — NICOTINE POLACRILEX 4 MG
15 LOZENGE BUCCAL PRN
Status: DISCONTINUED | OUTPATIENT
Start: 2020-10-10 | End: 2020-10-12 | Stop reason: HOSPADM

## 2020-10-10 RX ORDER — DEXTROSE MONOHYDRATE 25 G/50ML
12.5 INJECTION, SOLUTION INTRAVENOUS PRN
Status: DISCONTINUED | OUTPATIENT
Start: 2020-10-10 | End: 2020-10-12 | Stop reason: HOSPADM

## 2020-10-10 RX ORDER — ATORVASTATIN CALCIUM 20 MG/1
20 TABLET, FILM COATED ORAL DAILY
Status: DISCONTINUED | OUTPATIENT
Start: 2020-10-10 | End: 2020-10-12 | Stop reason: HOSPADM

## 2020-10-10 RX ORDER — ASPIRIN 81 MG/1
81 TABLET, CHEWABLE ORAL DAILY
Status: DISCONTINUED | OUTPATIENT
Start: 2020-10-10 | End: 2020-10-12 | Stop reason: HOSPADM

## 2020-10-10 RX ORDER — METOPROLOL SUCCINATE 50 MG/1
50 TABLET, EXTENDED RELEASE ORAL DAILY
Status: DISCONTINUED | OUTPATIENT
Start: 2020-10-10 | End: 2020-10-12 | Stop reason: HOSPADM

## 2020-10-10 RX ORDER — POTASSIUM CHLORIDE 20 MEQ/1
40 TABLET, EXTENDED RELEASE ORAL PRN
Status: DISCONTINUED | OUTPATIENT
Start: 2020-10-10 | End: 2020-10-12 | Stop reason: HOSPADM

## 2020-10-10 RX ORDER — SODIUM CHLORIDE 0.9 % (FLUSH) 0.9 %
10 SYRINGE (ML) INJECTION PRN
Status: DISCONTINUED | OUTPATIENT
Start: 2020-10-10 | End: 2020-10-12 | Stop reason: HOSPADM

## 2020-10-10 RX ORDER — OXYCODONE HYDROCHLORIDE 5 MG/1
5 TABLET ORAL EVERY 4 HOURS PRN
COMMUNITY

## 2020-10-10 RX ORDER — ALPRAZOLAM 0.5 MG/1
0.5 TABLET ORAL NIGHTLY PRN
COMMUNITY
End: 2021-01-18

## 2020-10-10 RX ORDER — MAGNESIUM SULFATE IN WATER 40 MG/ML
2 INJECTION, SOLUTION INTRAVENOUS PRN
Status: DISCONTINUED | OUTPATIENT
Start: 2020-10-10 | End: 2020-10-12 | Stop reason: HOSPADM

## 2020-10-10 RX ORDER — ACETAMINOPHEN 650 MG/1
650 SUPPOSITORY RECTAL EVERY 6 HOURS PRN
Status: DISCONTINUED | OUTPATIENT
Start: 2020-10-10 | End: 2020-10-12 | Stop reason: HOSPADM

## 2020-10-10 RX ORDER — SODIUM CHLORIDE 0.9 % (FLUSH) 0.9 %
10 SYRINGE (ML) INJECTION EVERY 12 HOURS SCHEDULED
Status: DISCONTINUED | OUTPATIENT
Start: 2020-10-10 | End: 2020-10-12 | Stop reason: HOSPADM

## 2020-10-10 RX ORDER — METHIMAZOLE 5 MG/1
5 TABLET ORAL 3 TIMES DAILY
Status: DISCONTINUED | OUTPATIENT
Start: 2020-10-10 | End: 2020-10-10

## 2020-10-10 RX ORDER — INSULIN LISPRO 100 [IU]/ML
0-12 INJECTION, SOLUTION INTRAVENOUS; SUBCUTANEOUS
Status: DISCONTINUED | OUTPATIENT
Start: 2020-10-10 | End: 2020-10-12 | Stop reason: HOSPADM

## 2020-10-10 RX ORDER — ALBUTEROL SULFATE 2.5 MG/3ML
2.5 SOLUTION RESPIRATORY (INHALATION) EVERY 6 HOURS PRN
Status: DISCONTINUED | OUTPATIENT
Start: 2020-10-10 | End: 2020-10-12 | Stop reason: HOSPADM

## 2020-10-10 RX ORDER — GUAIFENESIN 600 MG/1
1200 TABLET, EXTENDED RELEASE ORAL DAILY
Status: ON HOLD | COMMUNITY
End: 2020-10-12 | Stop reason: HOSPADM

## 2020-10-10 RX ORDER — DEXAMETHASONE 4 MG/1
6 TABLET ORAL DAILY
Status: DISCONTINUED | OUTPATIENT
Start: 2020-10-10 | End: 2020-10-12 | Stop reason: HOSPADM

## 2020-10-10 RX ORDER — POTASSIUM CHLORIDE 7.45 MG/ML
10 INJECTION INTRAVENOUS PRN
Status: DISCONTINUED | OUTPATIENT
Start: 2020-10-10 | End: 2020-10-12 | Stop reason: HOSPADM

## 2020-10-10 RX ORDER — GABAPENTIN 100 MG/1
100 CAPSULE ORAL NIGHTLY
Status: DISCONTINUED | OUTPATIENT
Start: 2020-10-10 | End: 2020-10-10 | Stop reason: ALTCHOICE

## 2020-10-10 RX ORDER — SODIUM CHLORIDE 9 MG/ML
INJECTION, SOLUTION INTRAVENOUS
Status: COMPLETED
Start: 2020-10-10 | End: 2020-10-10

## 2020-10-10 RX ORDER — GUAIFENESIN/DEXTROMETHORPHAN 100-10MG/5
5 SYRUP ORAL EVERY 4 HOURS PRN
Status: DISCONTINUED | OUTPATIENT
Start: 2020-10-10 | End: 2020-10-12 | Stop reason: HOSPADM

## 2020-10-10 RX ORDER — ACETAMINOPHEN 325 MG/1
650 TABLET ORAL EVERY 6 HOURS PRN
Status: DISCONTINUED | OUTPATIENT
Start: 2020-10-10 | End: 2020-10-12 | Stop reason: HOSPADM

## 2020-10-10 RX ORDER — DEXTROSE MONOHYDRATE 50 MG/ML
100 INJECTION, SOLUTION INTRAVENOUS PRN
Status: DISCONTINUED | OUTPATIENT
Start: 2020-10-10 | End: 2020-10-12 | Stop reason: HOSPADM

## 2020-10-10 RX ORDER — OXYCODONE HYDROCHLORIDE 5 MG/1
5 TABLET ORAL EVERY 4 HOURS PRN
Status: DISCONTINUED | OUTPATIENT
Start: 2020-10-10 | End: 2020-10-12 | Stop reason: HOSPADM

## 2020-10-10 RX ORDER — POLYETHYLENE GLYCOL 3350 17 G/17G
17 POWDER, FOR SOLUTION ORAL DAILY PRN
Status: DISCONTINUED | OUTPATIENT
Start: 2020-10-10 | End: 2020-10-12 | Stop reason: HOSPADM

## 2020-10-10 RX ORDER — 0.9 % SODIUM CHLORIDE 0.9 %
30 INTRAVENOUS SOLUTION INTRAVENOUS PRN
Status: DISCONTINUED | OUTPATIENT
Start: 2020-10-10 | End: 2020-10-12 | Stop reason: HOSPADM

## 2020-10-10 RX ORDER — LANOLIN ALCOHOL/MO/W.PET/CERES
3 CREAM (GRAM) TOPICAL NIGHTLY PRN
Status: DISCONTINUED | OUTPATIENT
Start: 2020-10-10 | End: 2020-10-12 | Stop reason: HOSPADM

## 2020-10-10 RX ORDER — 0.9 % SODIUM CHLORIDE 0.9 %
20 INTRAVENOUS SOLUTION INTRAVENOUS ONCE
Status: COMPLETED | OUTPATIENT
Start: 2020-10-10 | End: 2020-10-10

## 2020-10-10 RX ORDER — ALBUTEROL SULFATE 90 UG/1
2 AEROSOL, METERED RESPIRATORY (INHALATION) EVERY 6 HOURS PRN
Status: DISCONTINUED | OUTPATIENT
Start: 2020-10-10 | End: 2020-10-12 | Stop reason: HOSPADM

## 2020-10-10 RX ORDER — BUDESONIDE AND FORMOTEROL FUMARATE DIHYDRATE 160; 4.5 UG/1; UG/1
2 AEROSOL RESPIRATORY (INHALATION) 2 TIMES DAILY
Status: DISCONTINUED | OUTPATIENT
Start: 2020-10-10 | End: 2020-10-12 | Stop reason: HOSPADM

## 2020-10-10 RX ADMIN — MELATONIN TAB 3 MG 3 MG: 3 TAB at 03:28

## 2020-10-10 RX ADMIN — DEXAMETHASONE 6 MG: 4 TABLET ORAL at 03:27

## 2020-10-10 RX ADMIN — MELATONIN TAB 3 MG 3 MG: 3 TAB at 23:30

## 2020-10-10 RX ADMIN — Medication 10 ML: at 09:07

## 2020-10-10 RX ADMIN — OXYCODONE 5 MG: 5 TABLET ORAL at 09:16

## 2020-10-10 RX ADMIN — Medication 30 ML: at 14:00

## 2020-10-10 RX ADMIN — INSULIN LISPRO 2 UNITS: 100 INJECTION, SOLUTION INTRAVENOUS; SUBCUTANEOUS at 09:07

## 2020-10-10 RX ADMIN — ASPIRIN 81 MG: 81 TABLET, CHEWABLE ORAL at 09:06

## 2020-10-10 RX ADMIN — OXYCODONE 5 MG: 5 TABLET ORAL at 21:19

## 2020-10-10 RX ADMIN — ATORVASTATIN CALCIUM 20 MG: 20 TABLET, FILM COATED ORAL at 09:07

## 2020-10-10 RX ADMIN — Medication 10 ML: at 21:20

## 2020-10-10 RX ADMIN — SODIUM CHLORIDE 30 ML: 9 INJECTION, SOLUTION INTRAVENOUS at 14:00

## 2020-10-10 RX ADMIN — ENOXAPARIN SODIUM 30 MG: 30 INJECTION SUBCUTANEOUS at 21:19

## 2020-10-10 RX ADMIN — METOPROLOL SUCCINATE 50 MG: 50 TABLET, EXTENDED RELEASE ORAL at 09:06

## 2020-10-10 RX ADMIN — OXYCODONE 5 MG: 5 TABLET ORAL at 13:53

## 2020-10-10 RX ADMIN — SODIUM CHLORIDE 20 ML: 9 INJECTION, SOLUTION INTRAVENOUS at 14:45

## 2020-10-10 RX ADMIN — Medication 10 ML: at 14:00

## 2020-10-10 RX ADMIN — BUPROPION HYDROCHLORIDE 150 MG: 150 TABLET, EXTENDED RELEASE ORAL at 21:19

## 2020-10-10 RX ADMIN — BUPROPION HYDROCHLORIDE 150 MG: 150 TABLET, EXTENDED RELEASE ORAL at 09:06

## 2020-10-10 RX ADMIN — ENOXAPARIN SODIUM 30 MG: 30 INJECTION SUBCUTANEOUS at 09:06

## 2020-10-10 RX ADMIN — REMDESIVIR 200 MG: 100 INJECTION, POWDER, LYOPHILIZED, FOR SOLUTION INTRAVENOUS at 13:00

## 2020-10-10 RX ADMIN — INSULIN GLARGINE 14 UNITS: 100 INJECTION, SOLUTION SUBCUTANEOUS at 22:42

## 2020-10-10 ASSESSMENT — ENCOUNTER SYMPTOMS
ABDOMINAL PAIN: 0
SHORTNESS OF BREATH: 1
RHINORRHEA: 0
NAUSEA: 0
CHEST TIGHTNESS: 0
EYE REDNESS: 0
DIARRHEA: 0
CHOKING: 0
STRIDOR: 0
APNEA: 0
COUGH: 1
FACIAL SWELLING: 0
TROUBLE SWALLOWING: 0
COLOR CHANGE: 0
BLOOD IN STOOL: 0
PHOTOPHOBIA: 0
EYE DISCHARGE: 0

## 2020-10-10 ASSESSMENT — PAIN SCALES - GENERAL
PAINLEVEL_OUTOF10: 7
PAINLEVEL_OUTOF10: 7
PAINLEVEL_OUTOF10: 0
PAINLEVEL_OUTOF10: 6
PAINLEVEL_OUTOF10: 3
PAINLEVEL_OUTOF10: 7
PAINLEVEL_OUTOF10: 0
PAINLEVEL_OUTOF10: 7

## 2020-10-10 ASSESSMENT — PAIN DESCRIPTION - LOCATION: LOCATION: GENERALIZED

## 2020-10-10 ASSESSMENT — PAIN DESCRIPTION - FREQUENCY: FREQUENCY: CONTINUOUS

## 2020-10-10 ASSESSMENT — PAIN DESCRIPTION - PAIN TYPE: TYPE: CHRONIC PAIN

## 2020-10-10 ASSESSMENT — PAIN DESCRIPTION - PROGRESSION: CLINICAL_PROGRESSION: GRADUALLY WORSENING

## 2020-10-10 ASSESSMENT — PAIN DESCRIPTION - DESCRIPTORS: DESCRIPTORS: ACHING

## 2020-10-10 ASSESSMENT — PAIN - FUNCTIONAL ASSESSMENT: PAIN_FUNCTIONAL_ASSESSMENT: ACTIVITIES ARE NOT PREVENTED

## 2020-10-10 ASSESSMENT — PAIN DESCRIPTION - ORIENTATION: ORIENTATION: OTHER (COMMENT)

## 2020-10-10 ASSESSMENT — PAIN DESCRIPTION - ONSET: ONSET: ON-GOING

## 2020-10-10 NOTE — H&P
_    [facility]    10/10/2020 1:32 AM    Patient Information:  Tish Blanco is a 69 y.o. female [id number]    PCP:  Dayana Merida MD (Tel: [phone] )    Date of Service:   Pt seen/examined on 10/10/2020   Admitted to Inpatient with expected LOS greater than two midnights due to medical therapy. Chief complaint:  No chief complaint on file. History of Present Illness:  Ori Quintero is a 69 y.o. female who presented with   · Lethargy and sleeping a lot at home for 2-3 days at home and patient has been having unsteady gait @ Home . Acting lethargic @ Home . She checked her O2 saturation and noted Low O2 saturation @ Home in 60s . Called PMD and was told to come to ED . · Has a h/o Right Lung CA and is s/p resection on partial Rt Lung in past and is iff Chemo Radiation now X 5 years   · Went to [facility] ED and was Transferred here for further care. She been tested + for COVID @ 10/9/20   · Imaging @ ED concerning for PNA and patient was requiring inc O2 needs and hence being admitted for Transferred to [address] [place] [address] for further care   · She denies any cough   · No F/C   · No sick contacts though her Amanuel Miller daughter is An MA   · No N/V/D       History and pertinent information obtained from   · ED provider   · Prior Chart    · Patient          While in ED  · Patient care Given  · Monitoring done   · Pertinent Labs done and acute issues noted. · Imaging CXR , CT, done in ED . Acute issues noted as below. While in ED, Indicated/Pertinent Medications/Care given as below      · Nebs   · Solumedrol   · Azithromycin X 1       · Imaging done in ED as below. And is/are s/o LLB PNA and no PE . CT head is Unremarkable for acute findings. · Pertinent Abnormal Labs and their interpretation/active and acute issues as mentioned below.        Currently, on my evaluation, patient is :   · Since arrival, post above Rx, patient is AO X 3   · Is on NC o2 now @ 2-3 LPM     Patient denies any acute complaints to me, needing interventions specifically & emergently @ Time of my evaluation. Patient is not in acute hemodynamically unstable distress from respiratory or cardiac standpoint @ time of my evaluation. REVIEW OF SYSTEMS:     Constitutional:  Malaise + and weakness   ENT:   Negative for rhinorrhea, epistaxis, hoarseness, sore throat. Respiratory:   Negative for shortness of breath, wheezing  Cardiovascular:   Negative for  chest pain, palpitations   Gastrointestinal:   Negative for nausea, vomiting, diarrhea  Genitourinary:   Negative for polyuria, dysuria   Hematologic/Lymphatic:   Negative for  bleeding tendency, easy bruising  Musculoskeletal:   Negative for myalgias and arthralgias  Neurologic:   Negative for  Confusion, dysarthria. Skin:   Negative for itching,rash  Psychiatric:  Negative for depression, anxiety, agitation. Endocrine:  Negative for polydipsia, polyuria,heat /cold intolerance. Past Medical History:         has a past medical history of Antineoplastic chemotherapy induced anemia, Anxiety, Benign essential hypertension, Cervical radiculitis, Cervicalgia, Chemotherapy induced nausea and vomiting, Chronic fatigue syndrome, Chronic obstructive pulmonary disease (COPD) (HCC), Contusion of left foot, COPD, moderate (HCC), Diabetes mellitus type II, controlled ([address] Encounter for chemotherapy management, Hearing disorder, sensorineural, Hyperlipidemia, Hyperthyroidism, Iron deficiency anemia, Left foot pain, Lung cancer ([address] Lung mass, Malignant neoplasm of upper lobe of right lung (HCC), Myofascial pain on right side, Neoplasm related pain, Primary osteoarthritis of right hip, Rotator cuff tendinitis, Tinnitus, Tobacco user, and Uncontrolled type 2 diabetes mellitus without complication, without long-term current use of insulin.        Past Surgical History:         has a past surgical history that includes Hysterectomy (1972); bladder repair (1972); thoracotomy (Right, 6/17/2015); and Tunneled venous port placement (Left, 7/17/15). Medications:      Current Outpatient Medications on File Prior to Encounter   Medication Sig Dispense Refill    ACCU-CHEK IRA PLUS strip TEST FOUR TIMES DAILY 100 strip 3    metFORMIN (GLUCOPHAGE) 1000 MG tablet TAKE 1 TABLET BY MOUTH TWICE DAILY WITH MEALS 180 tablet 3    metoprolol succinate (TOPROL XL) 50 MG extended release tablet TAKE 1 TABLET BY MOUTH EVERY DAY 90 tablet 1    methIMAzole (TAPAZOLE) 10 MG tablet Take 0.5 tablets by mouth 3 times daily 90 tablet 5    buPROPion (WELLBUTRIN SR) 150 MG extended release tablet TAKE 1 TABLET BY MOUTH TWICE DAILY 180 tablet 1    atorvastatin (LIPITOR) 20 MG tablet TAKE 1 TABLET BY MOUTH EVERY DAY 90 tablet 1    ACCU-CHEK SOFTCLIX LANCETS MISC USE FOUR TIMES DAILY AS DIRECTED 400 each 2    Glucose Blood (ACCU-CHEK IRA PLUS VI) 1 each by In Vitro route 4 times daily Indications: Please dispense Accu-Chek Ira Plus Test Strips with DX code E11.9       Blood Glucose Monitoring Suppl (ACCU-CHEK IRA PLUS) w/Device KIT 1 each by Does not apply route daily Please dispense Accu-Chek Ira Plus Meter with DX code E11.9 1 kit 0    fluticasone-vilanterol (BREO ELLIPTA) 100-25 MCG/INH AEPB inhaler Inhale 1 puff into the lungs daily 1 each 5    albuterol sulfate  (90 Base) MCG/ACT inhaler Inhale 2 puffs into the lungs every 6 hours as needed for Wheezing 1 Inhaler 11    gabapentin (NEURONTIN) 100 MG capsule TAKE 1 CAPSULE BY MOUTH EVERY NIGHT AT BEDTIME (Patient taking differently: TAKE 1 CAPSULE BY MOUTH THREE TIMES DAILY) 90 capsule 0    aspirin 81 MG chewable tablet Take 81 mg by mouth daily Indications: OTC           Allergies: Allergies   Allergen Reactions    Codeine Anxiety          Social History:   reports that she quit smoking about 5 years ago. Her smoking use included cigarettes. She has a 52.50 pack-year smoking history.  She has never used smokeless tobacco. She reports that she does not drink alcohol. Family History:            Problem Relation Age of Onset    Diabetes Father         & TB history    Hypertension Father     Diabetes Sister     Diabetes Brother     Stroke Son     Osteoporosis Mother         & Angina    Anesth Problems Neg Hx     Malig Hyperten Neg Hx     Hypotension Neg Hx     Malig Hypertherm Neg Hx     Pseudochol. Deficiency Neg Hx            Physical Exam:  BP (!) 158/62   Pulse 67   Temp 97.8 °F (36.6 °C) (Temporal)   Resp 16   Ht 5' (1.524 m)   Wt 126 lb (57.2 kg)   SpO2 92%   BMI 24.61 kg/m²     General appearance: Appears  Comfortable    Eyes:  Sclera clear, pupils equal  ENT:  Moist mucus membranes, Trachea midline. Cardiovascular: Regular rhythm, normal S1, S2. No murmur, gallop, rub. No edema in lower extremities   Respiratory:  Noted Dec AE B/ L . Gastrointestinal:  Abdomen soft,  non-tender,  not distended,  normal bowel sounds   Musculoskeletal:  No cyanosis in digits, neck supple  Neurology:  Cranial nerves grossly intact. Alert and oriented in time, place and person. No speech or motor deficits   Psychiatry:  Appropriate affect. Not agitated  Skin:  Warm, dry, normal turgor, no rash       Labs:     No results for input(s): WBC, HGB, HCT, PLT in the last 72 hours. No results for input(s): NA, K, CL, CO2, BUN, CREATININE, CALCIUM, PHOS in the last 72 hours. Invalid input(s): MAGNES  No results for input(s): AST, ALT, BILIDIR, BILITOT, ALKPHOS in the last 72 hours. No results for input(s): INR in the last 72 hours. No results for input(s): Jt Oiler in the last 72 hours.       Urinalysis:      Lab Results   Component Value Date    NITRU Negative 07/17/2015    WBCUA 10-20 07/17/2015    BACTERIA Rare 06/08/2015    RBCUA None seen 07/17/2015    BLOODU Negative 07/17/2015    SPECGRAV 1.025 07/17/2015    GLUCOSEU Negative 07/17/2015         Radiology:       IMAGING :     No orders to display         PROBLEM LIST      Active Hospital Problems    Diagnosis Date Noted    COVID-19 [U07.1] 10/10/2020    Acute respiratory failure (HCC) [J96.00] 10/09/2020       PLAN/ORDERS FOR THIS ADMISSION/HOSPITALIZATION     COVID PNA/ LLB PNA . [ Acute/Present on arrival/Reason for Admission ]  => Tested + @ 10/9/20 . Maintain Droplet and contact precautions for now . Will send routine and then F/u daily labs a/w COV-19 Infection , CRP/CK/LDH/D dimer/Ferritin levels . => Started Supportive care . Started Empiric decadron @ admission per protocol recommendations @ 6 mg QD X 10 days => Pulmonary c/s . ID c/s in AM     Elevated D dimer . Will Start anticoagulation per protocol depending on D dimer results . Labs pending @ time of admission     ·   · RLL Lung nodule . Needs OP f/u   ·   · Essential Hypertension . Chronic condition. For now, resumed home medications of  BB   ·   · Uncontrolled DM 2 , w/ Hyperglycemia . Is on PO Medications @ Home. . While inpatient, start Insulin Regimen per orders and will closely monitor blood sugars and adjust regimen prn . Rx of Hypoglycemia prn , per order sets. ·   · Mixed Hyperlipidemia  . Chronic condition. For now, resumed home medications of  Statins   ·   · Neuropathy . Chronic condition. For now, resumed home medications of  Neuropathy   ·   · Enlarging Thyroid Gland and known Hyperthyroidism . Chronic condition. Check TFTs . Not on methimazole any more per patient [ recently d/susie   ·   · H/o RML Lung CA , is s/p Chemo Radiation in past         ·  Home medications for chronic medical problems reviewed and Held / Resumed / Pertinent changes made [as mentioned above] in home medications, as clinically warranted/Indicated .  See EPIC orders for details         · DVT Prophylaxis : Lovenox SQ ; + SCDs   · Nutrition/Diet: DIET CARB CONTROL;  · Code Status: Full Code  · PT/OT and ambulatory Eval Status:  Ambulate with Assist    · Probable LOS & future Disposition planned post discharge  - Home in 2-3 days       Please see EPIC orders for detailed orders/recommendations of plan and medications. CONSULTS ORDERED @ ADMISSION   IP CONSULT TO INFECTIOUS DISEASES   IP CONSULT TO PULMONOLOGY      Medical Decision Making : HIGH     Total patient Care [ Direct and Indirect ] time spent in evaluating the patient an discussing plan with appropriate staff/patient/family members is 54 min       Rito Villanueva MD    Hospitalist, [facility].    10/10/2020 1:32 AM

## 2020-10-10 NOTE — PROGRESS NOTES
4 Eyes Skin Assessment     NAME:  Aisha Stover  YOB: 1950  MEDICAL RECORD NUMBER:  [id number]    The patient is being assess for  Admission    I agree that 2 RN's have performed a thorough Head to Toe Skin Assessment on the patient. ALL assessment sites listed below have been assessed. Areas assessed by both nurses:    Head, Face, Ears, Shoulders, Back, Chest, Arms, Elbows, Hands, Sacrum. Buttock, Coccyx, Ischium and Legs. Feet and Heels        Does the Patient have a Wound? Yes wound(s) were present on assessment.  LDA wound assessment was Initiated and completed        Santos Prevention initiated:  Yes   Wound Care Orders initiated:  No    Pressure Injury (Stage 3,4, Unstageable, DTI, NWPT, and Complex wounds) if present place consult order under [de-identified] No    New and Established Ostomies if present place consult order under : NA      Nurse 1 eSignature: Electronically signed by Veronica Lima RN on 10/10/20 at 3:14 AM EDT    **SHARE this note so that the co-signing nurse is able to place an eSignature**    Nurse 2 eSignature: Electronically signed by Donald Nj RN on 10/10/20 at 3:46 AM EDT

## 2020-10-10 NOTE — PROGRESS NOTES
Patient admitted (transfer from [facility]) into [facility]. Placed on monitor. Afebrile. VSS. 92% on 2LO2NC. Patient's O2 NC came off her nose and her O2 sats dropped to 88%  Increased O2 to2L. Will practice C&DB and teach IS use. Skin pink warm dry and intact. One small scrape 1 cm lesion/scab healing on her left lower leg anterior shin. Coccyx/Sacrum unremarkable. Placed sacral border. LS diminished throughout. ABD soft +BSx4. Last BM 10/9/20. Patient states she voids regularly. Cap refill <2-3 sec. Pulses bilaterally radial and pedal medium/palpable. Denies pain at this time. Assisted with gown. Oriented patient to room and call light system. Reviewed plan of care for safety. Reviewed that patient needs to call nursing for ambulation to bathroom. SR up x3. Call light in reach. Bed in lowest position. Bed alarm activated. [facility]

## 2020-10-10 NOTE — CONSULTS
[facility] Pulmonary and Critical Care   Consult Note      Reason for Consult: COVID-19 pneumonia  Requesting Physician: Cristina Acuna    Subjective:   CHIEF COMPLAINT: Shortness of breath/lethargy     HPI: Presented to [facility] ER with few day history of lethargy and unsteady gait. Associated with shortness of breath with no cough or fever. Noted to have profound hypoxia and was also tested positive at outside facility-transferred to [facility] for further management. No clear sick contacts, but has several family members residing at home. Patient's granddaughter is an MA. Pulmonary consultation has been requested. History of moderate COPD-on Breo 100 and albuterol inhaler as needed, right upper lobe non-small cell lung cancer-wedge resection followed by adjuvant treatment with chemotherapy/immunotherapy and radiation in 2015. The patient is a 69 y.o. female with significant past medical history of:      Diagnosis Date    Antineoplastic chemotherapy induced anemia 11/12/2015    Anxiety     Benign essential hypertension     Cervical radiculitis 11/3/2017    Cervicalgia 11/3/2017    Chemotherapy induced nausea and vomiting 9/24/2015    Chronic fatigue syndrome     Chronic obstructive pulmonary disease (COPD) (HCC)     Contusion of left foot 3/8/2017    COPD, moderate (HCC) 6/1/2016    INTERPRETATION: Spirometry showed decreased FVC of 1.86 L, 77% predicted and decreased  FEV-1 of 1.16 L, 60% predicted. FEV-1/FVC ratio was decreased at 63%. No response to bronchodilators demonstrated on spirometry. Lung volumes showed increased total lung capacity of 141% predicted and residual volume of 241% predicted. Diffusion capacity showed  decreased DLCO of 66% predicted.   IMPRESSION: Mod    Diabetes mellitus type II, controlled [address] [address]     Encounter for chemotherapy management 10/8/2015    Hearing disorder, sensorineural 2/23/2017    Hyperlipidemia     Hyperthyroidism     Iron deficiency anemia     Left foot pain 2/22/2017    [facility] [address]     Lung mass     R lung mass    Malignant neoplasm of upper lobe of right lung (HCC) 5/5/2015    Myofascial pain on right side 10/1/2015    Neoplasm related pain     Primary osteoarthritis of right hip 3/8/2017    Rotator cuff tendinitis 11/3/2017    Tinnitus 2/23/2017    Tobacco user     quit smoking in jan, 2015    Uncontrolled type 2 diabetes mellitus without complication, without long-term current use of insulin 3/11/2016        Past Surgical History:        Procedure Laterality Date    BLADDER REPAIR  1972    tuck    HYSTERECTOMY  1972    THORACOTOMY Right 6/17/2015    Dr. Richard Baron - via VATS w/RUL for adenocarcinoma    TUNNELED VENOUS PORT PLACEMENT Left 7/17/15    Dr. Richard Ocampoie Portal power injectable port - 8F (max 300psi; MRI conditional 3-Bhakti)     Current Medications:    Current Facility-Administered Medications: sodium chloride flush 0.9 % injection 10 mL, 10 mL, Intravenous, 2 times per day  sodium chloride flush 0.9 % injection 10 mL, 10 mL, Intravenous, PRN  acetaminophen (TYLENOL) tablet 650 mg, 650 mg, Oral, Q6H PRN **OR** acetaminophen (TYLENOL) suppository 650 mg, 650 mg, Rectal, Q6H PRN  polyethylene glycol (GLYCOLAX) packet 17 g, 17 g, Oral, Daily PRN  promethazine (PHENERGAN) tablet 12.5 mg, 12.5 mg, Oral, Q6H PRN **OR** ondansetron (ZOFRAN) injection 4 mg, 4 mg, Intravenous, Q6H PRN  potassium chloride (KLOR-CON M) extended release tablet 40 mEq, 40 mEq, Oral, PRN **OR** potassium bicarb-citric acid (EFFER-K) effervescent tablet 40 mEq, 40 mEq, Oral, PRN **OR** potassium chloride 10 mEq/100 mL IVPB (Peripheral Line), 10 mEq, Intravenous, PRN  magnesium sulfate 2 g in 50 mL IVPB premix, 2 g, Intravenous, PRN  dexamethasone (DECADRON) tablet 6 mg, 6 mg, Oral, Daily  guaiFENesin-dextromethorphan (ROBITUSSIN DM) 100-10 MG/5ML syrup 5 mL, 5 mL, Oral, Q4H PRN  aspirin chewable tablet 81 mg, 81 mg, Oral, Daily  atorvastatin (LIPITOR) tablet 20 mg, 20 mg, Oral, Daily  buPROPion [facility] SR) extended release tablet 150 mg, 150 mg, Oral, BID  metoprolol succinate (TOPROL XL) extended release tablet 50 mg, 50 mg, Oral, Daily  glucose (GLUTOSE) 40 % oral gel 15 g, 15 g, Oral, PRN  dextrose 50 % IV solution, 12.5 g, Intravenous, PRN  glucagon (rDNA) injection 1 mg, 1 mg, Intramuscular, PRN  dextrose 5 % solution, 100 mL/hr, Intravenous, PRN  insulin glargine (LANTUS;BASAGLAR) injection pen 14 Units, 0.25 Units/kg, Subcutaneous, Nightly  insulin lispro (1 Unit Dial) 0-12 Units, 0-12 Units, Subcutaneous, TID WC  insulin lispro (1 Unit Dial) 0-6 Units, 0-6 Units, Subcutaneous, Nightly  enoxaparin (LOVENOX) injection 30 mg, 30 mg, Subcutaneous, BID  melatonin tablet 3 mg, 3 mg, Oral, Nightly PRN  hydrALAZINE (APRESOLINE) injection 10 mg, 10 mg, Intravenous, Q6H PRN  oxyCODONE (ROXICODONE) immediate release tablet 5 mg, 5 mg, Oral, Q4H PRN  0.9 % sodium chloride bolus, 20 mL, Intravenous, Once  remdesivir 200 mg in sodium chloride 0.9 % 250 mL IVPB, 200 mg, Intravenous, Once **FOLLOWED BY** [START ON 10/11/2020] remdesivir 100 mg in sodium chloride 0.9 % 250 mL IVPB, 100 mg, Intravenous, Q24H  0.9 % sodium chloride bolus, 30 mL, Intravenous, PRN    Allergies   Allergen Reactions    Codeine Anxiety       Social History:    TOBACCO:   reports that she quit smoking about 5 years ago. Her smoking use included cigarettes. She has a 52.50 pack-year smoking history. She has never used smokeless tobacco.  ETOH:   reports no history of alcohol use. Patient currently lives independently    Family History:       Problem Relation Age of Onset    Diabetes Father         & TB history    Hypertension Father     Diabetes Sister     Diabetes Brother     Stroke Son     Osteoporosis Mother         & Angina    Anesth Problems Neg Hx     Malig Hyperten Neg Hx     Hypotension Neg Hx     Malig Hypertherm Neg Hx     Pseudochol.  Deficiency Neg Hx        REVIEW OF SYSTEMS: Constitutional: Fatigue and malaise  Ears, nose, mouth, throat: negative for ear drainage, epistaxis, hoarseness, nasal congestion, sore throat and voice change  Respiratory: negative except for shortness of breath  Cardiovascular: negative for chest pain, chest pressure/discomfort, irregular heart beat, lower extremity edema and palpitations  Gastrointestinal: negative for abdominal pain, constipation, diarrhea, jaundice, melena, odynophagia, reflux symptoms and vomiting  Hematologic/lymphatic: negative for bleeding, easy bruising, lymphadenopathy and petechiae  Musculoskeletal:negative for arthralgias, bone pain, muscle weakness, neck pain and stiff joints  Neurological: negative for dizziness, gait problems, headaches, seizures, speech problems, tremors and weakness  Behavioral/Psych: negative for anxiety, behavior problems, depression, fatigue and sleep disturbance  Endocrine: negative for diabetic symptoms including none, neuropathy, polyphagia, polyuria, polydipsia, vomiting and diarrhea and temperature intolerance  Allergic/Immunologic: negative for anaphylaxis, angioedema, hay fever and urticaria      Objective:     Patient Vitals for the past 8 hrs:   BP Temp Temp src Pulse Resp SpO2   10/10/20 0906 (!) 158/64 -- -- 66 -- --   10/10/20 0800 (!) 158/64 97.2 °F (36.2 °C) Temporal 53 16 95 %   10/10/20 0404 -- -- -- -- 19 94 %   10/10/20 0400 125/82 97.9 °F (36.6 °C) Temporal 53 20 92 %   10/10/20 0300 (!) 140/52 -- -- 68 -- 94 %     No intake/output data recorded. No intake/output data recorded. Physical Exam:    Due to the current efforts to prevent transmission of COVID-19 and also the need to preserve PPE for other caregivers, a face-to-face encounter with the patient was not performed. That being said, all relevant records and diagnostic tests were reviewed, including laboratory results and imaging. Please reference any relevant documentation elsewhere.  Care will be coordinated with the primary service. Data Review:  CBC:   Lab Results   Component Value Date    WBC 4.6 10/10/2020    RBC 4.39 10/10/2020    RBC 3.63 06/15/2017     BMP:   Lab Results   Component Value Date    GLUCOSE 197 10/10/2020    GLUCOSE 120 06/15/2017    CO2 31 10/10/2020    BUN 13 10/10/2020    CREATININE 0.8 10/10/2020    CALCIUM 9.3 10/10/2020     ABG:   Lab Results   Component Value Date    SHW9QND 26.7 09/26/2018    BEART 2.1 09/26/2018    V5IZOSZL 90.1 09/26/2018    PHART 7.426 09/26/2018    QOU9MYZ 40.6 09/26/2018    PO2ART 56.1 09/26/2018    RSV2KDU 62.5 09/26/2018       Radiology: All pertinent images / reports were reviewed as a part of this visit. Narrative    EXAMINATION: CT-ANGIO CHEST PE PROTOCOL 92312         EXAM DATE: 10/9/2020 3:50 PM         DEMOGRAPHICS: 69years old Female         INDICATION:                        History:  sob, hypoxia, hx: lung ca. Number of Series/Images:  8. 75 ml    OMNIPAQUE 350 mg/mL injection.         COMPARISON: No existing relevant imaging study corresponding to the same    anatomical region is available.         TECHNIQUE: The study was performed with the rapid administration of    intravenous contrast timed to best evaluate and opacify the pulmonary    arteries and their branches. Axial source images were obtained. 3-D    reconstructions were performed. Imaging and processing were performed per    the institutional PE evaluation protocol.  CT radiation dose optimization    techniques (automated exposure control, and use of iterative    reconstruction techniques, or adjustment of the mA and/or kV according to    patient size) were used to limit patient radiation dose.         CT CHEST FINDINGS:         Chest wall:  The chest wall and axilla are within normal limits.             Thyroid: Partially visualized thyroid gland is enlarged and heterogenous.                 Mediastinum: Nonspecific mildly prominent pretracheal lymph node measures    9 mm in short axis on axial image 63

## 2020-10-10 NOTE — PLAN OF CARE
Problem: Gas Exchange - Impaired:  Goal: Levels of oxygenation will improve  Description: Levels of oxygenation will improve  Outcome: Ongoing  Patient's O2 sats will maintain at >90%.

## 2020-10-10 NOTE — CONSULTS
done.  Blood group is A+    She is not on home oxygen. She desaturated to 60% on room air at home. She is currently on 3 L oxygen via nasal cannula    Plan:     Diagnostic Workup:     Agree with checking baseline , lactate, d-dimer, LDH level   Add procalcitonin to morning labs   Will order every other day LDH level and d-dimer, which may help with prognostication (Reference: LIN[facility] Intern Med. Published online March 13, 2020. doi:10.1001/jamainternmed.2020.0994). We will also order pro calcitonin and CRP  level every other day for now   2 sets of blood cultures from different sites for thoroughness of work-up   Urine Legionella and pneumococcal antigens   Chest x-ray reviewed. Due to exposure risk, will avoid doing CT scan of the chest or 2 view chest x-ray due to limited utility   Nasal MRSA screening culture        Antimicrobials:     The patient is on 3 L oxygen via nasal cannula. She is at high risk of complications. Will start IV remdesivir 20 mg loading dose followed 100 mg every 24 hours   Continue Decadron 6 mg daily   Maintain good glycemic control while on Decadron and remdesivir   Given high risk of complications, will also order 2 units of convalescent plasma under FDA EUA   Supportive care is still the cornerstone of treatment for COVID 19 infection. Currently, there are no fully FDA approved treatments for COVID 19 infection. The recommendations below are based on frequently changing guidelines from [place] [address] and IDSA and limited published data available so far, and some of those may change again in coming days. [employer] Remdesevir has been granted FDA emergency use authorization (EUA) for use in hospitalized COVID 19 patients. Preliminary results from Adaptive COVID 19 Treatment Trial (ACTT - 1) sponsored by Anjel Pratt showed faster recovery time in patients who received Remdesveir as compared to placebo. (https://shea.org/. org/doi/full/10.1056/JBNBsu3756188).   This study also showed a trend towards mortality benefit in the treatment arm, however, the data on mortality benefit did not read statistical significance (which may be due to low power to detect mortality benefit in a study). In at least one recently published study, 5-day course of IV Remdesevir has not been found be statistically different from a 10-day course of IV Remdesevir in COVID 19 patients, who are not on a mechanical ventilator (https://shea.org/. org/doi/full/10.1056/QZJKxt9115245). [facility] has determined that there is insufficient data on optimal duration of remdesivir in patients who have not shown clinical improvement after 5 days of therapy. In this group, some experts extend the total remdesivir treatment duration to up to 10 days (CIII). In patients on mechanical ventilation or ECMO, IDSA suggests 10 days of remdesivir ( https://www.hawkins.com/ )   The RECOVERY (Randomised Evaluation of COVid-19 thERapY) trial conducted in [place] showed that dexamethasone reduced deaths by one-third in patients receiving invasive mechanical ventilation, by one-fifth in patients receiving oxygen without invasive mechanical ventilation, but did not reduce mortality in patients not receiving respiratory support at randomization. Dexamethasone was given up to 10 days in this trial (https://wall.org/. org/doi/full/10.1056/ZGFCgy7320502). The NIH COVID-19 Treatment Guidelines Panel recommends using dexamethasone (at a dose of 6 mg per day for up to 10 days) for the treatment of COVID-19 in patients who are mechanically ventilated (AI) and in patients who require supplemental oxygen but who are not mechanically ventilated (BI), but recommends against using dexamethasone for the treatment of COVID-19 in patients who do not require supplemental oxygen.     If dexamethasone is not available, the NIH Panel recommends using alternative glucocorticoids such as prednisone, methylprednisolone, or hydrocortisone (AIII)   FDA has issued emergency use authorization for COVID 19 convalescent plasma for treatment of hospitalized patients with COVID 19 on August 23, 2020 (http://PlanGrid.[employer]/). There is still insufficient data for the NIH panel to recommend either for or against its use. It should not be considered standard of care for the treatment of patients with COVID-19 according to the Rookopli 96 19 treatment panel.  Interleukin-6 inhibitor, Tocilizumab (Actemra) was previously proposed in severely ill patients, particularly those with very high interleukin-6 levels. However, NIH COVID-19 treatment  panel has determined that there is insufficient data to recommend either for or against the use of interleukin 1 inhibitors, interleukin-6 inhibitors and interferon beta for treatment of COVID 19 (CreditClassification.com.pt). Recently released update on the phase 3 COVACTA trial, which is the first global, randomised, double-blind, placebo-controlled phase III trial investigating Actemra/RoActemra in hospitalized patients with severe COVID 19 respiratory pneumonia did not meet its primary endpoint of improved clinical status in patients with COVID-19 associated pneumonia, or the key secondary endpoint of reduced patient mortality. ([employer].[place] [employer].cy)   FDA has withdrawn its EUA for hydroxychloroquine as a treatment modality for COVID 19 on Alanna 15, 2020. In 2 small, uncontrolled studies conducted in [place] and [place], hydroxychloroquine and its congener, chloroquine were previously reported to be effective against COVID-19. However, International Society of Antimicrobial Chemotherapy subsequently declared that the trial did not meet the Societys expected standard.  (LINWOOD Netw Open. 2020;3(4):b990774. EMX:82.3420/SLKQWSRPBVAVPIG.9982.4512).  A large recently published observational study in [facility] did not show any benefit of hydroxychloroquine in reducing intubation rates or mortality in COVID 19 patients (N Engl J Med. 2020 May 7. doi: 10.1056/LLEBok2023098). A large randomized controlled trial called the \"RECOVERY trial\"  conducted in the [facility] has not shown hydroxychloroquine to be beneficial in hospitalized COVID 19 patients. Although, one retrospective study published in IJID indicated potential benefit of hydroxychloroquine and hydroxychloroquine plus azithromycin combination   https://[employer]/) , it had several flaws including retrospective, nonrandomized design, concomitant use of steroids in most patients and exclusion of 10% of patients were still hospitalized at the time the study was ended, potentially skewing the results.  In a recently published study, hydroxychloroquine has also failed to show benefit as a postexposure prophylaxis for COVID 19 within 4 days after exposure (https://shea.org/. org/doi/full/10.1056/EIEXsr8077080)   The NIH COVID-19 Treatment Guidelines Panel now recommends against using hydroxychloroquine plus azithromycin for the treatment of COVID-19, except in a clinical trial (AIII) (Personeta.co.uk). The Panel recommends against the use of chloroquine or hydroxychloroquine for the treatment of COVID-19, except in a clinical trial (AII). The Panel recommends against the use of high-dose chloroquine (600 mg twice daily for 10 days) for the treatment of COVID-19 (AI).  Coadministration of Remdesvir and chloroquine phosphate or hydroxychloroquine sulfate is not recommended based on in vitro data demonstrating an antagonistic effect of chloroquine on the intracellular metabolic activation and antiviral activity of Remdesevir.  (Reference: Remdesevir package insert)  · Lopinavir/ritonavir trial has failed to show benefits in the recently published trial in [facility] (Reference:N Engl J Med. 2020 Mar 18. doi: 10.1056/GYMYgh1176483). However, this trial was under-powered. Except in the context of a clinical trial, the COVID-19 Treatment Guidelines Panel recommends against using lopinavir/ritonavir (AI) or other HIV protease inhibitors (AIII) for the treatment of COVID-19. (https://Dctio.Chobani/  · The NIH panel recommends against the use of Interferons (hamilton or beta), Bruton's tyrosine kinase inhibitors and Janus kinase inhibitors (AIII) for the treatment of COVID-19, except in a clinical trial (Airseed.Chobani.pt). The Panel recommends against the use of mesenchymal stem cells for the treatment of COVID-19, except in a clinical trial (AII).  Recommendations for routine VTE prophylaxis are the same for hospitalized pregnant and nonpregnant patients (AIII).  There is insufficient data about use of vitamin C, vitamin D, zinc in COVID 19. NIH guidelines recommend against use of statins, ACE inhibitor/ARB's solely for the purpose of treating COVID 19, if not otherwise clinically indicated for other medical reasons. IDSA panel suggests against famotidine use for the sole purpose of treating COVID-19 in hospitalized patients, outside of the context of a clinical trial .    Continue strict droplet plus isolation currently being used for COVID-19 infections.  Recommend close vitals monitoring.  Continue oxygen support to try to maintain oxygen saturation above 92%   Fall and aspiration precautions.  Discussed above plan with RN    Continue close monitoring in COVID 19 unit.         Drug Monitoring:    · Continue serial monitoring for antibiotic toxicity as follows: CBC, CMP  · Continue to watch for following: new or worsening fever, hypotension, hives, lip swelling and redness or purulence at vascular access sites.    I/v access Management:    · Continue to monitor i.v access sites for erythema, induration, discharge or tenderness. · As always, continue efforts to minimizetubes/lines/drains as clinically appropriate to reduce chances of line associated infections. Current isolation precautions:    Currently active isolation(s): Droplet Plus       Level of complexity of consult: High     Risk of Complications/Morbidity: High     · Illness(es)/ Infection present that pose threat to life/bodily function. · There is potential for severe exacerbation of infection/side effects of treatment. · Therapy requires intensive monitoring for antimicrobial agent toxicity. Thank you for involving me in the care of your patient. I will continue to follow. If you have any additional questions, please do not hesitate to contact me. Subjective:     Presenting complaint in ER:     No chief complaint on file. HPI: Simone Duke is a 69 y.o. female patient, who was seen at the request of Dr. Trina Charles MD.    History was obtained from chart review and the patient/ RN    The patient was admitted on 10/10/2020. I have been consulted to see the patient for above mentioned reason(s). The patient has multiple medical comorbidities, and presented to the ER for generalized weakness and lethargy and unsteady gait. The patient checked her oxygen saturation at home and she was desaturating in the 60s. The patient has history of right lung cancer and had a partial right lung resection in the past and is status post chemoradiation. The patient went to [facility] ER and was tested for COVID 19 on 10/9/2020 at [facility], which came back positive. The patient was transferred to [facility] and was admitted here    I have been asked for my opinion for management for this patient. Past Medical History: All past medical history reviewed today.     Past Medical History:   Diagnosis Date    Antineoplastic chemotherapy induced anemia 11/12/2015    Anxiety     Benign essential hypertension     Cervical radiculitis 11/3/2017    Cervicalgia 11/3/2017    Chemotherapy induced nausea and vomiting 9/24/2015    Chronic fatigue syndrome     Chronic obstructive pulmonary disease (COPD) (HCC)     Contusion of left foot 3/8/2017    COPD, moderate (HCC) 6/1/2016    INTERPRETATION: Spirometry showed decreased FVC of 1.86 L, 77% predicted and decreased  FEV-1 of 1.16 L, 60% predicted. FEV-1/FVC ratio was decreased at 63%. No response to bronchodilators demonstrated on spirometry. Lung volumes showed increased total lung capacity of 141% predicted and residual volume of 241% predicted. Diffusion capacity showed  decreased DLCO of 66% predicted. IMPRESSION: Mod    Diabetes mellitus type II, controlled ([address]     Encounter for chemotherapy management 10/8/2015    Hearing disorder, sensorineural 2/23/2017    Hyperlipidemia     Hyperthyroidism     Iron deficiency anemia     Left foot pain 2/22/2017    Lung cancer ([address]     Lung mass     R lung mass    Malignant neoplasm of upper lobe of right lung ([address] 5/5/2015    Myofascial pain on right side 10/1/2015    Neoplasm related pain     Primary osteoarthritis of right hip 3/8/2017    Rotator cuff tendinitis 11/3/2017    Tinnitus 2/23/2017    Tobacco user     quit smoking in jan, 2015    Uncontrolled type 2 diabetes mellitus without complication, without long-term current use of insulin 3/11/2016         Past Surgical History: All pastsurgical history was reviewed today. Past Surgical History:   Procedure Laterality Date    BLADDER REPAIR  1972    tuck    HYSTERECTOMY  1972    THORACOTOMY Right 6/17/2015    Dr. Janet Rodriguez - via VATS w/RUL for adenocarcinoma    TUNNELED VENOUS PORT PLACEMENT Left 7/17/15    Dr. Janet Rodriguez  [place] power injectable port - 8F (max 300psi; MRI conditional 3-Bhakti)         Family History:  All family history was reviewed today.        Problem Relation Age of Onset    Diabetes Father         & TB history    Hypertension Father     Diabetes Sister     Diabetes Brother     Stroke Son     Osteoporosis Mother         & Angina    Anesth Problems Neg Hx     Malig Hyperten Neg Hx     Hypotension Neg Hx     Malig Hypertherm Neg Hx     Pseudochol. Deficiency Neg Hx          Medications: All current and past medications were reviewed. Medications Prior to Admission: guaiFENesin (MUCINEX) 600 MG extended release tablet, Take 1,200 mg by mouth daily  ALPRAZolam (XANAX) 0.5 MG tablet, Take 0.5 mg by mouth nightly as needed for Sleep.  melatonin 3 MG TABS tablet, Take 3 mg by mouth nightly as needed  oxyCODONE (ROXICODONE) 5 MG immediate release tablet, Take 5 mg by mouth every 4 hours as needed for Pain (Oxycodone 5 mg Immediately Q 4-6 Hours PRN).  Takes for cancer pain (phantom pain) where right rib lobectomy pain  ACCU-CHEK IRA PLUS strip, TEST FOUR TIMES DAILY  metFORMIN (GLUCOPHAGE) 1000 MG tablet, TAKE 1 TABLET BY MOUTH TWICE DAILY WITH MEALS  buPROPion (WELLBUTRIN SR) 150 MG extended release tablet, TAKE 1 TABLET BY MOUTH TWICE DAILY  atorvastatin (LIPITOR) 20 MG tablet, TAKE 1 TABLET BY MOUTH EVERY DAY  ACCU-CHEK SOFTCLIX LANCETS MISC, USE FOUR TIMES DAILY AS DIRECTED  Glucose Blood (ACCU-CHEK IRA PLUS VI), 1 each by In Vitro route 4 times daily Indications: Please dispense Accu-Chek Ira Plus Test Strips with DX code E11.9   Blood Glucose Monitoring Suppl (ACCU-CHEK IRA PLUS) w/Device KIT, 1 each by Does not apply route daily Please dispense Accu-Chek Ira Plus Meter with DX code E11.9  albuterol sulfate  (90 Base) MCG/ACT inhaler, Inhale 2 puffs into the lungs every 6 hours as needed for Wheezing  aspirin 81 MG chewable tablet, Take 81 mg by mouth daily Indications: OTC  metoprolol succinate (TOPROL XL) 50 MG extended release tablet, TAKE 1 TABLET BY MOUTH EVERY DAY  methIMAzole (TAPAZOLE) 10 MG tablet, Take 0.5 tablets by mouth 3 times daily  fluticasone-vilanterol (BREO ELLIPTA) 100-25 MCG/INH AEPB inhaler, Inhale 1 puff into the lungs daily  gabapentin (NEURONTIN) 100 MG capsule, TAKE 1 CAPSULE BY MOUTH EVERY NIGHT AT BEDTIME (Patient taking differently: TAKE 1 CAPSULE BY MOUTH THREE TIMES DAILY)     sodium chloride flush  10 mL Intravenous 2 times per day    dexamethasone  6 mg Oral Daily    aspirin  81 mg Oral Daily    atorvastatin  20 mg Oral Daily    buPROPion  150 mg Oral BID    metoprolol succinate  50 mg Oral Daily    insulin glargine  0.25 Units/kg Subcutaneous Nightly    insulin lispro  0-12 Units Subcutaneous TID WC    insulin lispro  0-6 Units Subcutaneous Nightly    enoxaparin  30 mg Subcutaneous BID    sodium chloride  20 mL Intravenous Once          REVIEW OF SYSTEMS:       Review of Systems   Constitutional: Positive for fatigue. Negative for chills, diaphoresis and unexpected weight change. HENT: Negative for congestion, ear discharge, ear pain, facial swelling, hearing loss, rhinorrhea and trouble swallowing. Eyes: Negative for photophobia, discharge, redness and visual disturbance. Respiratory: Positive for cough and shortness of breath. Negative for apnea, choking, chest tightness and stridor. Cardiovascular: Negative for chest pain and palpitations. Gastrointestinal: Negative for abdominal pain, blood in stool, diarrhea and nausea. Endocrine: Negative for polydipsia, polyphagia and polyuria. Genitourinary: Negative for difficulty urinating, dysuria, frequency, hematuria, menstrual problem and vaginal discharge. Musculoskeletal: Negative for arthralgias, joint swelling, myalgias and neck stiffness. Skin: Negative for color change and rash. Allergic/Immunologic: Negative for immunocompromised state. Neurological: Negative for dizziness, seizures, speech difficulty, light-headedness and headaches. Hematological: Negative for adenopathy.    Psychiatric/Behavioral: Negative for agitation, hallucinations and suicidal ideas. Objective:       PHYSICAL EXAM:      Vitals:   Vitals:    10/10/20 0400 10/10/20 0404 10/10/20 0800 10/10/20 0906   BP: 125/82  (!) 158/64 (!) 158/64   Pulse: 53  53 66   Resp: 20 19 16    Temp: 97.9 °F (36.6 °C)  97.2 °F (36.2 °C)    TempSrc: Temporal  Temporal    SpO2: 92% 94% 95%    Weight:       Height:           Physical Exam     PHYSICAL EXAM:     In-person bedside physical examination deferred. Pursuant to the emergency declaration under the [facility], [address] [place] [address] and the [employer] Act, this clinical encounter was conducted to provide necessary medical care. (Also consistent with new provisions and guidance offered by [facility] on March 18, 2020 in setting of COVID 19 outbreak and in order to preserve personal protective equipment in accordance with the flexibilities announced by CMS on March 30, 2020)   References: https://[facility]. [facility]/Portals/0/Resources/COVID-19/3_18%20Telemed%20Guidance%20Updated%20March%2018. pdf?wsk=[email or web address]                      https://[facility]. [facility]/Portals/0/Resources/COVID-19/3_18%20Telemed%20Guidance%20Updated%20March%2018. pdf?wta=[email or web address]                      http://[email or web address] pdf                            General: Awake and oriented to time place and person according to primary service, vitals reviewed  HEENT: Deferred  Cardiovascular: Telemetry data reviewed, rest deferred   Pulmonary: deferred  Abdomen/GI: deferred  Neuro: deferred  Skin: deferred  Musculoskeletal:  deferred  Genitourinary: Deferred  Psych: deferred  Lymphatic/Immunologic: deferred    Intake and output:     No intake/output data recorded. Lab Data:   All available labs were reviewed by me today.      CBC:   Recent Labs     10/10/20  0144 10/10/20  0435   WBC 4.0 4.6   RBC 4.22 4.39   HGB 12.3 12.5   HCT 37.7 38.9    195   MCV 89.2 88.5   MCH 29.1 28.6   MCHC 32.6 32.3   RDW 15.1 14.8        BMP:  Recent Labs     10/10/20  0145 10/10/20  0434    139   K 4.0 4.3   CL 99 99   CO2 28 31   BUN 11 13   CREATININE 0.7 0.8   CALCIUM 9.2 9.3   GLUCOSE 213* 197*        Hepatic FunctionPanel:   Lab Results   Component Value Date    ALKPHOS 116 10/10/2020    ALT 13 10/10/2020    AST 20 10/10/2020    PROT 7.0 10/10/2020    PROT 6.9 06/15/2017    BILITOT 0.3 10/10/2020    BILIDIR 0.3 07/17/2015    IBILI 0.8 07/17/2015    LABALBU 4.0 10/10/2020       CPK:   Lab Results   Component Value Date    CKTOTAL 121 10/10/2020     ESR:   Lab Results   Component Value Date    SEDRATE 36 (H) 06/08/2018     CRP: No results found for: CRP      Imaging: All pertinent images and reports for the current visit were reviewed by meduring this visit. XR CHEST PORTABLE    (Results Pending)       Outside records:    Labs, Microbiology, Radiology and pertinent results from Care everywhere, if available, were reviewed as a part ofthe consultation. Problem list:       Patient Active Problem List   Diagnosis Code    Essential hypertension I10    Hyperthyroidism E05.90    Major depression, recurrent ([address] F33.9    Chronic obstructive pulmonary disease ([address] J44.9    Carpal tunnel syndrome of right wrist G56.01    Lung cancer ([address] C34.90    Type 2 diabetes mellitus with other specified complication ([address] O42.69    Acute respiratory failure ([address] J96.00    COVID-19 U07.1    Pneumonia due to COVID-19 virus U07.1, J12.89    History of lung cancer Z85. 80    Ex-smoker Z87.891    Chronic obstructive pulmonary disease with acute lower respiratory infection ([address] D22.4    Metabolic encephalopathy A57.74    Mixed hyperlipidemia E78.2         Please note that this chart was generated using Dragon dictation software.  Although every effort was made to ensure the accuracy of this automated transcription, some errors in transcription may have occurred inadvertently. If you may need any clarification, please do not hesitate to contact me through EPIC or at the phone number provided below with my electronic signature. Any pictures or media included in this note were obtained after taking informed verbal consent from the patient and with their approval to include those in the patient's medical record.       Tracy Amaya MD, MPH  10/10/20, 10:00 AM EDT   [facility] Infectious Disease   [address] [place] [address], [address] [place] [address]  Office: [phone]  Fax: [phone]  Clinic days:  Tuesday & Thursday

## 2020-10-10 NOTE — PROGRESS NOTES
Verbalizes would rather be home. Breakfast ordered and here immediately. Complaints of pain. Pain medication ordered per Dr. Deena Whitley. ID called plan for Remdesivir and Plasma as pt. High risk factors. Call light in reach, meds as ordered.

## 2020-10-11 ENCOUNTER — APPOINTMENT (OUTPATIENT)
Dept: GENERAL RADIOLOGY | Age: 70
DRG: 177 | End: 2020-10-11
Attending: FAMILY MEDICINE
Payer: MEDICARE

## 2020-10-11 LAB
A/G RATIO: 1.3 (ref 1.1–2.2)
ALBUMIN SERPL-MCNC: 3.6 G/DL (ref 3.4–5)
ALP BLD-CCNC: 91 U/L (ref 40–129)
ALT SERPL-CCNC: 13 U/L (ref 10–40)
ANION GAP SERPL CALCULATED.3IONS-SCNC: 8 MMOL/L (ref 3–16)
APTT: 28.4 SEC (ref 24.2–36.2)
AST SERPL-CCNC: 18 U/L (ref 15–37)
BASOPHILS ABSOLUTE: 0 K/UL (ref 0–0.2)
BASOPHILS RELATIVE PERCENT: 0.2 %
BILIRUB SERPL-MCNC: 0.3 MG/DL (ref 0–1)
BUN BLDV-MCNC: 23 MG/DL (ref 7–20)
C-REACTIVE PROTEIN: 2.7 MG/L (ref 0–5.1)
CALCIUM SERPL-MCNC: 8.6 MG/DL (ref 8.3–10.6)
CHLORIDE BLD-SCNC: 99 MMOL/L (ref 99–110)
CO2: 31 MMOL/L (ref 21–32)
CREAT SERPL-MCNC: 0.8 MG/DL (ref 0.6–1.2)
D DIMER: 200 NG/ML DDU (ref 0–229)
EOSINOPHILS ABSOLUTE: 0 K/UL (ref 0–0.6)
EOSINOPHILS RELATIVE PERCENT: 0.1 %
ESTIMATED AVERAGE GLUCOSE: 165.7 MG/DL
FERRITIN: 128.9 NG/ML (ref 15–150)
FIBRINOGEN: 226 MG/DL (ref 200–397)
GFR AFRICAN AMERICAN: >60
GFR NON-AFRICAN AMERICAN: >60
GLOBULIN: 2.8 G/DL
GLUCOSE BLD-MCNC: 123 MG/DL (ref 70–99)
GLUCOSE BLD-MCNC: 137 MG/DL (ref 70–99)
GLUCOSE BLD-MCNC: 237 MG/DL (ref 70–99)
GLUCOSE BLD-MCNC: 76 MG/DL (ref 70–99)
GLUCOSE BLD-MCNC: 93 MG/DL (ref 70–99)
HBA1C MFR BLD: 7.4 %
HCT VFR BLD CALC: 34.7 % (ref 36–48)
HEMOGLOBIN: 11.3 G/DL (ref 12–16)
INR BLD: 0.93 (ref 0.86–1.14)
L. PNEUMOPHILA SEROGP 1 UR AG: NORMAL
LACTATE DEHYDROGENASE: 174 U/L (ref 100–190)
LYMPHOCYTES ABSOLUTE: 1.7 K/UL (ref 1–5.1)
LYMPHOCYTES RELATIVE PERCENT: 19 %
MCH RBC QN AUTO: 29 PG (ref 26–34)
MCHC RBC AUTO-ENTMCNC: 32.6 G/DL (ref 31–36)
MCV RBC AUTO: 88.9 FL (ref 80–100)
MONOCYTES ABSOLUTE: 0.7 K/UL (ref 0–1.3)
MONOCYTES RELATIVE PERCENT: 7.7 %
NEUTROPHILS ABSOLUTE: 6.7 K/UL (ref 1.7–7.7)
NEUTROPHILS RELATIVE PERCENT: 73 %
PDW BLD-RTO: 15.1 % (ref 12.4–15.4)
PERFORMED ON: ABNORMAL
PERFORMED ON: NORMAL
PLATELET # BLD: 201 K/UL (ref 135–450)
PMV BLD AUTO: 9.1 FL (ref 5–10.5)
POTASSIUM REFLEX MAGNESIUM: 3.6 MMOL/L (ref 3.5–5.1)
PROCALCITONIN: 0.06 NG/ML (ref 0–0.15)
PROTHROMBIN TIME: 10.8 SEC (ref 10–13.2)
RBC # BLD: 3.9 M/UL (ref 4–5.2)
SODIUM BLD-SCNC: 138 MMOL/L (ref 136–145)
STREP PNEUMONIAE ANTIGEN, URINE: NORMAL
TOTAL CK: 88 U/L (ref 26–192)
TOTAL PROTEIN: 6.4 G/DL (ref 6.4–8.2)
WBC # BLD: 9.1 K/UL (ref 4–11)

## 2020-10-11 PROCEDURE — 82728 ASSAY OF FERRITIN: CPT

## 2020-10-11 PROCEDURE — 85610 PROTHROMBIN TIME: CPT

## 2020-10-11 PROCEDURE — 85025 COMPLETE CBC W/AUTO DIFF WBC: CPT

## 2020-10-11 PROCEDURE — 6370000000 HC RX 637 (ALT 250 FOR IP): Performed by: PHYSICIAN ASSISTANT

## 2020-10-11 PROCEDURE — 2580000003 HC RX 258: Performed by: HOSPITALIST

## 2020-10-11 PROCEDURE — 85384 FIBRINOGEN ACTIVITY: CPT

## 2020-10-11 PROCEDURE — 99232 SBSQ HOSP IP/OBS MODERATE 35: CPT | Performed by: INTERNAL MEDICINE

## 2020-10-11 PROCEDURE — 2700000000 HC OXYGEN THERAPY PER DAY

## 2020-10-11 PROCEDURE — 6370000000 HC RX 637 (ALT 250 FOR IP): Performed by: HOSPITALIST

## 2020-10-11 PROCEDURE — 80053 COMPREHEN METABOLIC PANEL: CPT

## 2020-10-11 PROCEDURE — 86140 C-REACTIVE PROTEIN: CPT

## 2020-10-11 PROCEDURE — 71045 X-RAY EXAM CHEST 1 VIEW: CPT

## 2020-10-11 PROCEDURE — 6360000002 HC RX W HCPCS: Performed by: HOSPITALIST

## 2020-10-11 PROCEDURE — 2060000000 HC ICU INTERMEDIATE R&B

## 2020-10-11 PROCEDURE — 83615 LACTATE (LD) (LDH) ENZYME: CPT

## 2020-10-11 PROCEDURE — 94761 N-INVAS EAR/PLS OXIMETRY MLT: CPT

## 2020-10-11 PROCEDURE — 85730 THROMBOPLASTIN TIME PARTIAL: CPT

## 2020-10-11 PROCEDURE — 94640 AIRWAY INHALATION TREATMENT: CPT

## 2020-10-11 PROCEDURE — 2580000003 HC RX 258: Performed by: INTERNAL MEDICINE

## 2020-10-11 PROCEDURE — 82550 ASSAY OF CK (CPK): CPT

## 2020-10-11 PROCEDURE — 36415 COLL VENOUS BLD VENIPUNCTURE: CPT

## 2020-10-11 PROCEDURE — 85379 FIBRIN DEGRADATION QUANT: CPT

## 2020-10-11 PROCEDURE — 2500000003 HC RX 250 WO HCPCS: Performed by: INTERNAL MEDICINE

## 2020-10-11 PROCEDURE — 6370000000 HC RX 637 (ALT 250 FOR IP): Performed by: INTERNAL MEDICINE

## 2020-10-11 PROCEDURE — 84145 PROCALCITONIN (PCT): CPT

## 2020-10-11 RX ORDER — IBUPROFEN 400 MG/1
400 TABLET ORAL EVERY 6 HOURS PRN
Status: DISCONTINUED | OUTPATIENT
Start: 2020-10-11 | End: 2020-10-12 | Stop reason: HOSPADM

## 2020-10-11 RX ADMIN — ENOXAPARIN SODIUM 30 MG: 30 INJECTION SUBCUTANEOUS at 09:28

## 2020-10-11 RX ADMIN — Medication 2 PUFF: at 08:25

## 2020-10-11 RX ADMIN — OXYCODONE 5 MG: 5 TABLET ORAL at 23:44

## 2020-10-11 RX ADMIN — ASPIRIN 81 MG: 81 TABLET, CHEWABLE ORAL at 09:28

## 2020-10-11 RX ADMIN — OXYCODONE 5 MG: 5 TABLET ORAL at 17:37

## 2020-10-11 RX ADMIN — ATORVASTATIN CALCIUM 20 MG: 20 TABLET, FILM COATED ORAL at 09:28

## 2020-10-11 RX ADMIN — REMDESIVIR 100 MG: 100 INJECTION, POWDER, LYOPHILIZED, FOR SOLUTION INTRAVENOUS at 11:43

## 2020-10-11 RX ADMIN — BUPROPION HYDROCHLORIDE 150 MG: 150 TABLET, EXTENDED RELEASE ORAL at 09:28

## 2020-10-11 RX ADMIN — BUPROPION HYDROCHLORIDE 150 MG: 150 TABLET, EXTENDED RELEASE ORAL at 21:03

## 2020-10-11 RX ADMIN — Medication 2 PUFF: at 20:09

## 2020-10-11 RX ADMIN — METOPROLOL SUCCINATE 50 MG: 50 TABLET, EXTENDED RELEASE ORAL at 09:27

## 2020-10-11 RX ADMIN — IBUPROFEN 400 MG: 400 TABLET, FILM COATED ORAL at 01:12

## 2020-10-11 RX ADMIN — DEXAMETHASONE 6 MG: 4 TABLET ORAL at 09:27

## 2020-10-11 RX ADMIN — INSULIN GLARGINE 14 UNITS: 100 INJECTION, SOLUTION SUBCUTANEOUS at 21:11

## 2020-10-11 RX ADMIN — INSULIN LISPRO 4 UNITS: 100 INJECTION, SOLUTION INTRAVENOUS; SUBCUTANEOUS at 17:38

## 2020-10-11 RX ADMIN — Medication 10 ML: at 09:26

## 2020-10-11 RX ADMIN — OXYCODONE 5 MG: 5 TABLET ORAL at 12:00

## 2020-10-11 RX ADMIN — MELATONIN TAB 3 MG 3 MG: 3 TAB at 23:44

## 2020-10-11 RX ADMIN — ENOXAPARIN SODIUM 30 MG: 30 INJECTION SUBCUTANEOUS at 21:02

## 2020-10-11 RX ADMIN — Medication 10 ML: at 21:02

## 2020-10-11 ASSESSMENT — PAIN SCALES - GENERAL
PAINLEVEL_OUTOF10: 5
PAINLEVEL_OUTOF10: 6
PAINLEVEL_OUTOF10: 7
PAINLEVEL_OUTOF10: 0
PAINLEVEL_OUTOF10: 5
PAINLEVEL_OUTOF10: 6
PAINLEVEL_OUTOF10: 0
PAINLEVEL_OUTOF10: 0
PAINLEVEL_OUTOF10: 6

## 2020-10-11 ASSESSMENT — PAIN DESCRIPTION - LOCATION: LOCATION: BACK

## 2020-10-11 ASSESSMENT — PAIN DESCRIPTION - PAIN TYPE: TYPE: CHRONIC PAIN

## 2020-10-11 NOTE — PROGRESS NOTES
Nursing assessment completed. Afebrile. VSS.  NSR. Up ad kwaku to bathroom. Voiding quantity sufficient yellow clear urine. LS diminished. No coughing at this time. Denies dyspnea and/or shortness of breath. No acute distress. Patient comfortable. SR up x3. Call light in reach.

## 2020-10-11 NOTE — PROGRESS NOTES
[facility] Pulmonary/CCM Progress note      Admit Date: 10/10/2020    Chief Complaint: Shortness of breath and lethargy    Subjective: Interval History: Patient is awake and alert, denies any shortness of breath or cough. Some sinus bradycardia episode noted in the 40s. On 2 L O2. Wants to go home.     Scheduled Meds:   sodium chloride flush  10 mL Intravenous 2 times per day    dexamethasone  6 mg Oral Daily    aspirin  81 mg Oral Daily    atorvastatin  20 mg Oral Daily    buPROPion  150 mg Oral BID    metoprolol succinate  50 mg Oral Daily    insulin glargine  0.25 Units/kg Subcutaneous Nightly    insulin lispro  0-12 Units Subcutaneous TID WC    insulin lispro  0-6 Units Subcutaneous Nightly    enoxaparin  30 mg Subcutaneous BID    remdesivir IVPB  100 mg Intravenous Q24H    budesonide-formoterol  2 puff Inhalation BID     Continuous Infusions:   dextrose       PRN Meds:ibuprofen, sodium chloride flush, acetaminophen **OR** acetaminophen, polyethylene glycol, promethazine **OR** ondansetron, potassium chloride **OR** potassium alternative oral replacement **OR** potassium chloride, magnesium sulfate, guaiFENesin-dextromethorphan, glucose, dextrose, glucagon (rDNA), dextrose, melatonin, hydrALAZINE, oxyCODONE, sodium chloride, albuterol, albuterol sulfate HFA    Review of Systems  Constitutional: Fatigue and malaise  Ears, nose, mouth, throat: negative for ear drainage, epistaxis, hoarseness, nasal congestion, sore throat and voice change  Respiratory: negative except for shortness of breath  Cardiovascular: negative for chest pain, chest pressure/discomfort, irregular heart beat, lower extremity edema and palpitations  Gastrointestinal: negative for abdominal pain, constipation, diarrhea, jaundice, melena, odynophagia, reflux symptoms and vomiting  Hematologic/lymphatic: negative for bleeding, easy bruising, lymphadenopathy and petechiae  Musculoskeletal:negative for arthralgias, bone pain, muscle weakness, neck pain and stiff joints  Neurological: negative for dizziness, gait problems, headaches, seizures, speech problems, tremors and weakness  Behavioral/Psych: negative for anxiety, behavior problems, depression, fatigue and sleep disturbance  Endocrine: negative for diabetic symptoms including none, neuropathy, polyphagia, polyuria, polydipsia, vomiting and diarrhea and temperature intolerance  Allergic/Immunologic: negative for anaphylaxis, angioedema, hay fever and urticaria    Objective:     Patient Vitals for the past 8 hrs:   BP Temp Temp src Pulse Resp SpO2   10/11/20 1150 (!) 120/59 97.9 °F (36.6 °C) Temporal 50 21 90 %   10/11/20 0923 119/70 97.4 °F (36.3 °C) Temporal 60 20 96 %   10/11/20 0825 -- -- -- -- 16 --     I/O last 3 completed shifts: In: 1095 [P.O.:360; I.V.:10; Blood:475; IV Piggyback:250]  Out: 400 [Urine:400]  No intake/output data recorded. Due to the current efforts to prevent transmission of COVID-19 and also the need to preserve PPE for other caregivers, a face-to-face encounter with the patient was not performed. That being said, all relevant records and diagnostic tests were reviewed, including laboratory results and imaging. Please reference any relevant documentation elsewhere. Care will be coordinated with the primary service. Data Review:  CBC:   Lab Results   Component Value Date    WBC 9.1 10/11/2020    RBC 3.90 10/11/2020    RBC 3.63 06/15/2017     BMP:   Lab Results   Component Value Date    GLUCOSE 76 10/11/2020    GLUCOSE 120 06/15/2017    CO2 31 10/11/2020    BUN 23 10/11/2020    CREATININE 0.8 10/11/2020    CALCIUM 8.6 10/11/2020     ABG:   Lab Results   Component Value Date    DMO3UIP 26.7 09/26/2018    BEART 2.1 09/26/2018    K6FLPRBO 90.1 09/26/2018    PHART 7.426 09/26/2018    KPB8ETA 40.6 09/26/2018    PO2ART 56.1 09/26/2018    DTJ4AZN 62.5 09/26/2018       Radiology: All pertinent images / reports were reviewed as a part of this visit.     Narrative EXAMINATION: CT-ANGIO CHEST PE PROTOCOL [id number]         EXAM DATE: 10/9/2020 3:50 PM         DEMOGRAPHICS: 69years old Female         INDICATION:                        History:  sob, hypoxia, hx: lung ca. Number of Series/Images:  8. 75 ml    OMNIPAQUE 350 mg/mL injection.         COMPARISON: No existing relevant imaging study corresponding to the same    anatomical region is available.         TECHNIQUE: The study was performed with the rapid administration of    intravenous contrast timed to best evaluate and opacify the pulmonary    arteries and their branches. Axial source images were obtained. 3-D    reconstructions were performed. Imaging and processing were performed per    the institutional PE evaluation protocol.  CT radiation dose optimization    techniques (automated exposure control, and use of iterative    reconstruction techniques, or adjustment of the mA and/or kV according to    patient size) were used to limit patient radiation dose.         CT CHEST FINDINGS:         Chest wall: The chest wall and axilla are within normal limits.             Thyroid: Partially visualized thyroid gland is enlarged and heterogenous.                 Mediastinum: Nonspecific mildly prominent pretracheal lymph node measures    9 mm in short axis on axial image 63 series 5. There is no evidence for    hiatal hernia.             Heart:  The heart is not enlarged without pericardial effusion.             Systemic arterial Vasculature: Advanced atherosclerotic vascular disease    of the thoracic aorta most prominent throughout descending thoracic aorta. There are large soft plaques are seen along the course of the proximal    abdominal aorta, only partially visualized. There is mild luminal    narrowing of the proximal abdominal aorta, approximated at 30% secondary    to soft plaques. No dissection.         Pulmonary arterial vasculature: The pulmonary arterial vasculature    demonstrates good contrast opacification. There is no evidence for a focal    filling defect. There is no evidence for focal stenosis or aneurysmal    dilatation.             3-D reconstructed images: These images confirm and further demonstrate the    findings from the axial source images. No additional focal abnormalities    are seen.         Tracheobronchial tree: No evidence of endotracheal debris.             Lungs: Subtle groundglass opacities throughout left lower lobe and mild    peribronchial thickening in the left lower lobe distribution. No focal    consolidation. Noncalcified 4 mm right lower lobe pulmonary nodule on    axial image 124 series 6.             Pleura: There is no evidence for pleural effusion or focal pleural    thickening.             Upper abdomen: Indeterminate right adrenal nodule, measures 1 cm. There is    mild diffuse thickening of the left adrenal gland. There are multiple    mildly prominent lymph nodes in the upper mesentery of the abdomen             Bones: Mild thoracic spondylosis, otherwise no acute bony abnormalities.                 [IMPRESSION]    1.  No acute pulmonary embolism. 2.  Advanced atherosclerotic vascular disease of the thoracic and    partially visualized abdominal aorta as discussed above. 3.  Subtle inflammatory groundglass opacities throughout left lower lobe. No focal consolidation. 4.  Noncalcified 4 mm right lower lobe pulmonary nodule, follow-up    according to [address] recommendations is advised. 5.  Enlarging heterogenous thyroid gland. Problem List:     COVID-19 pneumonia  Acute hypoxic respiratory failure  History of lung cancer  Assessment/Plan:     Patient appears stable, only requiring 2 L. Rapid COVID-19 antigen test was positive at outside facility. Report of CT imaging from outside facility suggestive of viral pneumonitis only. 4 mm right lower lobe lung nodule will require repeat CT imaging in 1 year.   I have spoken to the patient about this-who would continue follow-up with Dr. Chaitanya Gaming. On Remdesevir and Decadron. Already received convalescent plasma. Convince patient to stay back tonight until tomorrow-has care issues with her son and would like to go home as soon as possible. If so, we may not be able to complete the course of Remdesevir. Normal d-dimer-200, Lovenox 30 mg twice daily. Pulmonary will only follow peripherally.     Vasu Hair MD

## 2020-10-11 NOTE — PROGRESS NOTES
Nursing reassessment completed. Afebrile. VSS. Last medicated with oxycodone 5 mg po at 2119. Resting comfortably. Up ad kwaku. Gait steady. LS CTA diminished throughout. NSR. No acute distress. SR up x2. Call light in reach.

## 2020-10-11 NOTE — PROGRESS NOTES
[facility] PROGRESS NOTE    10/11/2020 1:49 PM        Name: Casandra Tanner . Admitted: 10/10/2020  Primary Care Provider: Declan Sutherland MD (Tel: [phone])    Brief Course:       CC: Shortness of breath    Subjective:  .     Patient is off the oxygen, feeling better, no cough or phlegm  Mention did had a loss of taste  Reviewed interval ancillary notes    Current Medications  ibuprofen (ADVIL;MOTRIN) tablet 400 mg, Q6H PRN  sodium chloride flush 0.9 % injection 10 mL, 2 times per day  sodium chloride flush 0.9 % injection 10 mL, PRN  acetaminophen (TYLENOL) tablet 650 mg, Q6H PRN    Or  acetaminophen (TYLENOL) suppository 650 mg, Q6H PRN  polyethylene glycol (GLYCOLAX) packet 17 g, Daily PRN  promethazine (PHENERGAN) tablet 12.5 mg, Q6H PRN    Or  ondansetron (ZOFRAN) injection 4 mg, Q6H PRN  potassium chloride (KLOR-CON M) extended release tablet 40 mEq, PRN    Or  potassium bicarb-citric acid (EFFER-K) effervescent tablet 40 mEq, PRN    Or  potassium chloride 10 mEq/100 mL IVPB (Peripheral Line), PRN  magnesium sulfate 2 g in 50 mL IVPB premix, PRN  dexamethasone (DECADRON) tablet 6 mg, Daily  guaiFENesin-dextromethorphan (ROBITUSSIN DM) 100-10 MG/5ML syrup 5 mL, Q4H PRN  aspirin chewable tablet 81 mg, Daily  atorvastatin (LIPITOR) tablet 20 mg, Daily  buPROPion (WELLBUTRIN SR) extended release tablet 150 mg, BID  metoprolol succinate (TOPROL XL) extended release tablet 50 mg, Daily  glucose (GLUTOSE) 40 % oral gel 15 g, PRN  dextrose 50 % IV solution, PRN  glucagon (rDNA) injection 1 mg, PRN  dextrose 5 % solution, PRN  insulin glargine (LANTUS;BASAGLAR) injection pen 14 Units, Nightly  insulin lispro (1 Unit Dial) 0-12 Units, TID WC  insulin lispro (1 Unit Dial) 0-6 Units, Nightly  enoxaparin (LOVENOX) injection 30 mg, BID  melatonin tablet 3 mg, Nightly PRN  hydrALAZINE (APRESOLINE) injection 10 mg, Q6H 10/10/20  0434 10/11/20  0456   AST 20 20 18   ALT 12 13 13   BILITOT 0.3 0.3 0.3   ALKPHOS 109 116 91     XR CHEST PORTABLE    (Results Pending)       Discussed care with family      Problem List  Active Problems:    Essential hypertension    Chronic obstructive pulmonary disease ([address]    Type 2 diabetes mellitus with other specified complication (HCC)    Acute respiratory failure ([address]    COVID-19    Pneumonia due to COVID-19 virus    History of lung cancer    Ex-smoker    Chronic obstructive pulmonary disease with acute lower respiratory infection ([address]    Metabolic encephalopathy    Mixed hyperlipidemia  Resolved Problems:    * No resolved hospital problems.  *       Assessment & Plan:   Acute hypoxic respiratory failure secondary to COVID-19 pneumonia with a history of lung cancer  S/p convalescent plasma on 10/10/2020  On room to severe and Decadron  Normal d-dimer normal fibrinogen and a PT    History of lung cancer with right lower lung nodule  Follow-up with Dr. Connor Davis    Diabetes mellitus  Insulin sliding scale, blood sugars in 90s to 120s range    IV Access: Peripheral  Blanco: No  Diet: DIET CARB CONTROL;  Code:Full Code  DVT PPX Lovenox  Disposition remains inpatient      Jaime Glover MD   10/11/2020 1:49 PM

## 2020-10-11 NOTE — PLAN OF CARE
Problem: Falls - Risk of:  Goal: Will remain free from falls  Description: Will remain free from falls  Outcome: Ongoing     Problem: Gas Exchange - Impaired:  Goal: Levels of oxygenation will improve  Description: Levels of oxygenation will improve  10/11/2020 0247 by Martin Browne RN  Outcome: Ongoing  10/11/2020 0246 by Martin Browne RN  Outcome: Ongoing     Problem: Pain:  Goal: Pain level will decrease  Description: Pain level will decrease  10/11/2020 0247 by Martin Browne RN  Outcome: Ongoing  10/11/2020 0246 by Martin Browne RN  Outcome: Ongoing

## 2020-10-11 NOTE — PROGRESS NOTES
Report given to Esperanza Oneill Nursing reassessment completed. Afebrile. SB 40-50. BP WNL. Denies pain. Resting quietly in bed. No acute changes at this time. SR up x3. Call light in reach.

## 2020-10-11 NOTE — PLAN OF CARE
Problem: Falls - Risk of:  Goal: Will remain free from falls  Description: Will remain free from falls  10/11/2020 1259 by Bill Smith RN  Outcome: Ongoing  10/11/2020 0246 by Marcus Keenan RN  Outcome: Ongoing  Goal: Absence of physical injury  Description: Absence of physical injury  Outcome: Ongoing     Problem: Gas Exchange - Impaired:  Goal: Levels of oxygenation will improve  Description: Levels of oxygenation will improve  10/11/2020 1259 by Bill Smith RN  Outcome: Ongoing  10/11/2020 0247 by Marcus Keenan RN  Outcome: Ongoing  10/11/2020 0246 by Marcus Keenan RN  Outcome: Ongoing     Problem: Pain:  Goal: Pain level will decrease  Description: Pain level will decrease  10/11/2020 1259 by Bill Smith RN  Outcome: Ongoing  10/11/2020 0247 by Marcus Keenan RN  Outcome: Ongoing  10/11/2020 0246 by Marcus Keenan RN  Outcome: Ongoing  Goal: Control of acute pain  Description: Control of acute pain  Outcome: Ongoing  Goal: Control of chronic pain  Description: Control of chronic pain  Outcome: Ongoing

## 2020-10-12 ENCOUNTER — TELEPHONE (OUTPATIENT)
Dept: INTERNAL MEDICINE CLINIC | Age: 70
End: 2020-10-12

## 2020-10-12 VITALS
DIASTOLIC BLOOD PRESSURE: 60 MMHG | RESPIRATION RATE: 18 BRPM | OXYGEN SATURATION: 97 % | BODY MASS INDEX: 23.93 KG/M2 | SYSTOLIC BLOOD PRESSURE: 142 MMHG | TEMPERATURE: 96.8 F | WEIGHT: 121.9 LBS | HEART RATE: 48 BPM | HEIGHT: 60 IN

## 2020-10-12 LAB
A/G RATIO: 1.2 (ref 1.1–2.2)
ALBUMIN SERPL-MCNC: 3.6 G/DL (ref 3.4–5)
ALP BLD-CCNC: 90 U/L (ref 40–129)
ALT SERPL-CCNC: 10 U/L (ref 10–40)
ANION GAP SERPL CALCULATED.3IONS-SCNC: 7 MMOL/L (ref 3–16)
APTT: 33.3 SEC (ref 24.2–36.2)
AST SERPL-CCNC: 14 U/L (ref 15–37)
BASOPHILS ABSOLUTE: 0 K/UL (ref 0–0.2)
BASOPHILS RELATIVE PERCENT: 0.2 %
BILIRUB SERPL-MCNC: 0.3 MG/DL (ref 0–1)
BUN BLDV-MCNC: 19 MG/DL (ref 7–20)
C-REACTIVE PROTEIN: 5.6 MG/L (ref 0–5.1)
CALCIUM SERPL-MCNC: 8.7 MG/DL (ref 8.3–10.6)
CHLORIDE BLD-SCNC: 101 MMOL/L (ref 99–110)
CO2: 30 MMOL/L (ref 21–32)
CREAT SERPL-MCNC: 0.7 MG/DL (ref 0.6–1.2)
D DIMER: <200 NG/ML DDU (ref 0–229)
EOSINOPHILS ABSOLUTE: 0 K/UL (ref 0–0.6)
EOSINOPHILS RELATIVE PERCENT: 0.2 %
FERRITIN: 117.9 NG/ML (ref 15–150)
FIBRINOGEN: 229 MG/DL (ref 200–397)
GFR AFRICAN AMERICAN: >60
GFR NON-AFRICAN AMERICAN: >60
GLOBULIN: 2.9 G/DL
GLUCOSE BLD-MCNC: 117 MG/DL (ref 70–99)
GLUCOSE BLD-MCNC: 79 MG/DL (ref 70–99)
GLUCOSE BLD-MCNC: 81 MG/DL (ref 70–99)
HCT VFR BLD CALC: 35.6 % (ref 36–48)
HEMOGLOBIN: 11.6 G/DL (ref 12–16)
INR BLD: 0.97 (ref 0.86–1.14)
LACTATE DEHYDROGENASE: 212 U/L (ref 100–190)
LYMPHOCYTES ABSOLUTE: 1.9 K/UL (ref 1–5.1)
LYMPHOCYTES RELATIVE PERCENT: 29.5 %
MAGNESIUM: 1.9 MG/DL (ref 1.8–2.4)
MCH RBC QN AUTO: 29.2 PG (ref 26–34)
MCHC RBC AUTO-ENTMCNC: 32.7 G/DL (ref 31–36)
MCV RBC AUTO: 89.2 FL (ref 80–100)
MONOCYTES ABSOLUTE: 0.7 K/UL (ref 0–1.3)
MONOCYTES RELATIVE PERCENT: 10.7 %
MRSA CULTURE ONLY: NORMAL
NEUTROPHILS ABSOLUTE: 3.8 K/UL (ref 1.7–7.7)
NEUTROPHILS RELATIVE PERCENT: 59.4 %
PDW BLD-RTO: 15.3 % (ref 12.4–15.4)
PERFORMED ON: ABNORMAL
PERFORMED ON: NORMAL
PLATELET # BLD: 177 K/UL (ref 135–450)
PMV BLD AUTO: 8.9 FL (ref 5–10.5)
POTASSIUM REFLEX MAGNESIUM: 3.5 MMOL/L (ref 3.5–5.1)
PROTHROMBIN TIME: 11.2 SEC (ref 10–13.2)
RBC # BLD: 3.99 M/UL (ref 4–5.2)
SARS-COV-2, NAA: DETECTED
SODIUM BLD-SCNC: 138 MMOL/L (ref 136–145)
TOTAL CK: 56 U/L (ref 26–192)
TOTAL PROTEIN: 6.5 G/DL (ref 6.4–8.2)
WBC # BLD: 6.3 K/UL (ref 4–11)

## 2020-10-12 PROCEDURE — 99233 SBSQ HOSP IP/OBS HIGH 50: CPT | Performed by: INTERNAL MEDICINE

## 2020-10-12 PROCEDURE — 85025 COMPLETE CBC W/AUTO DIFF WBC: CPT

## 2020-10-12 PROCEDURE — 82550 ASSAY OF CK (CPK): CPT

## 2020-10-12 PROCEDURE — 82728 ASSAY OF FERRITIN: CPT

## 2020-10-12 PROCEDURE — 6370000000 HC RX 637 (ALT 250 FOR IP): Performed by: INTERNAL MEDICINE

## 2020-10-12 PROCEDURE — 6370000000 HC RX 637 (ALT 250 FOR IP): Performed by: HOSPITALIST

## 2020-10-12 PROCEDURE — 83735 ASSAY OF MAGNESIUM: CPT

## 2020-10-12 PROCEDURE — 2500000003 HC RX 250 WO HCPCS: Performed by: INTERNAL MEDICINE

## 2020-10-12 PROCEDURE — 2580000003 HC RX 258: Performed by: INTERNAL MEDICINE

## 2020-10-12 PROCEDURE — 2580000003 HC RX 258: Performed by: HOSPITALIST

## 2020-10-12 PROCEDURE — 94680 O2 UPTK RST&XERS DIR SIMPLE: CPT

## 2020-10-12 PROCEDURE — 83615 LACTATE (LD) (LDH) ENZYME: CPT

## 2020-10-12 PROCEDURE — 94761 N-INVAS EAR/PLS OXIMETRY MLT: CPT

## 2020-10-12 PROCEDURE — 94640 AIRWAY INHALATION TREATMENT: CPT

## 2020-10-12 PROCEDURE — 80053 COMPREHEN METABOLIC PANEL: CPT

## 2020-10-12 PROCEDURE — 2700000000 HC OXYGEN THERAPY PER DAY

## 2020-10-12 PROCEDURE — 85379 FIBRIN DEGRADATION QUANT: CPT

## 2020-10-12 PROCEDURE — 86140 C-REACTIVE PROTEIN: CPT

## 2020-10-12 PROCEDURE — 85610 PROTHROMBIN TIME: CPT

## 2020-10-12 PROCEDURE — 6360000002 HC RX W HCPCS: Performed by: HOSPITALIST

## 2020-10-12 PROCEDURE — 85730 THROMBOPLASTIN TIME PARTIAL: CPT

## 2020-10-12 PROCEDURE — 85384 FIBRINOGEN ACTIVITY: CPT

## 2020-10-12 RX ORDER — GUAIFENESIN/DEXTROMETHORPHAN 100-10MG/5
5 SYRUP ORAL EVERY 4 HOURS PRN
Qty: 120 ML | Refills: 0 | Status: SHIPPED | OUTPATIENT
Start: 2020-10-12 | End: 2020-10-22

## 2020-10-12 RX ORDER — DEXAMETHASONE 6 MG/1
6 TABLET ORAL DAILY
Qty: 8 TABLET | Refills: 0 | Status: SHIPPED | OUTPATIENT
Start: 2020-10-13 | End: 2020-10-21

## 2020-10-12 RX ADMIN — BUPROPION HYDROCHLORIDE 150 MG: 150 TABLET, EXTENDED RELEASE ORAL at 09:12

## 2020-10-12 RX ADMIN — ATORVASTATIN CALCIUM 20 MG: 20 TABLET, FILM COATED ORAL at 09:12

## 2020-10-12 RX ADMIN — ENOXAPARIN SODIUM 30 MG: 30 INJECTION SUBCUTANEOUS at 09:12

## 2020-10-12 RX ADMIN — Medication 2 PUFF: at 08:18

## 2020-10-12 RX ADMIN — METOPROLOL SUCCINATE 50 MG: 50 TABLET, EXTENDED RELEASE ORAL at 09:12

## 2020-10-12 RX ADMIN — ASPIRIN 81 MG: 81 TABLET, CHEWABLE ORAL at 09:12

## 2020-10-12 RX ADMIN — REMDESIVIR 100 MG: 100 INJECTION, POWDER, LYOPHILIZED, FOR SOLUTION INTRAVENOUS at 12:47

## 2020-10-12 RX ADMIN — DEXAMETHASONE 6 MG: 4 TABLET ORAL at 09:56

## 2020-10-12 RX ADMIN — Medication 10 ML: at 10:06

## 2020-10-12 RX ADMIN — OXYCODONE 5 MG: 5 TABLET ORAL at 10:04

## 2020-10-12 ASSESSMENT — PAIN SCALES - GENERAL
PAINLEVEL_OUTOF10: 3
PAINLEVEL_OUTOF10: 0
PAINLEVEL_OUTOF10: 7

## 2020-10-12 ASSESSMENT — ENCOUNTER SYMPTOMS
EYE DISCHARGE: 0
STRIDOR: 0
RHINORRHEA: 0
CHOKING: 0
FACIAL SWELLING: 0
APNEA: 0
PHOTOPHOBIA: 0
SHORTNESS OF BREATH: 1
DIARRHEA: 0
TROUBLE SWALLOWING: 0
COLOR CHANGE: 0
BLOOD IN STOOL: 0
NAUSEA: 0
EYE REDNESS: 0
COUGH: 1
CHEST TIGHTNESS: 0
ABDOMINAL PAIN: 0

## 2020-10-12 ASSESSMENT — PAIN DESCRIPTION - PROGRESSION: CLINICAL_PROGRESSION: NOT CHANGED

## 2020-10-12 ASSESSMENT — PAIN DESCRIPTION - DESCRIPTORS: DESCRIPTORS: ACHING

## 2020-10-12 ASSESSMENT — PAIN DESCRIPTION - FREQUENCY: FREQUENCY: CONTINUOUS

## 2020-10-12 ASSESSMENT — PAIN DESCRIPTION - ONSET: ONSET: ON-GOING

## 2020-10-12 ASSESSMENT — PAIN DESCRIPTION - PAIN TYPE: TYPE: CHRONIC PAIN

## 2020-10-12 ASSESSMENT — PAIN DESCRIPTION - LOCATION: LOCATION: BACK

## 2020-10-12 ASSESSMENT — PAIN - FUNCTIONAL ASSESSMENT: PAIN_FUNCTIONAL_ASSESSMENT: ACTIVITIES ARE NOT PREVENTED

## 2020-10-12 NOTE — DISCHARGE INSTR - COC
Home Oxygen will be provided to you through:    [facility]ALEX[facility]AMP[facility]LL-ER - [phone]

## 2020-10-12 NOTE — PROGRESS NOTES
Home Oxygen Evaluation       Patients room air at rest saturation SpO2 96%     Patients room air saturation SpO2 86% with exertion      Patients SpO2 on exertion with oxygen  1 lpm = SpO2  90%   2 lpm = SpO2  94%

## 2020-10-12 NOTE — DISCHARGE SUMMARY
Patient: Keith Hernandez     Gender: female  : 1950   Age: 69 y.o. MRN: [id number]    Admitting Physician: Diann Griffin MD  Discharge Physician: Mindy Lares MD     Code Status: Full Code     Admit Date: 10/10/2020   Discharge Date:   10/12/2020    Disposition:  Home    Discharge Diagnoses:  COVID-19 pneumonia with acute hypoxic respiratory failure background history of lung cancer  Active Hospital Problems    Diagnosis Date Noted    COVID-19 [U07.1] 10/10/2020    Pneumonia due to COVID-19 virus [U07.1, J12.89]     History of lung cancer [Z85.118]     Ex-smoker [Z87.891]     Chronic obstructive pulmonary disease with acute lower respiratory infection ([address] [Y16.8]     Metabolic encephalopathy [E00.85]     Mixed hyperlipidemia [E78.2]     Acute respiratory failure ([address] [J96.00] 10/09/2020    Type 2 diabetes mellitus with other specified complication ([address] [D23.24] 2019    Chronic obstructive pulmonary disease ([address] [J44.9] 2016    Essential hypertension [I10]        Follow-up appointments:  one week    Outpatient to do list: none    Condition at Discharge:  [address] Course:   39-year-old lady admitted to hospital with some dyspnea required 2 L of oxygen  Rapid COVID-19 was positive at outside facility  CT imaging from outside facility suggested viral pneumonitis  She was started on remdesivir-Decadron. She also received convalescent plasma  At this point patient seems to have some home care issues with the son very clear that she wants  to go home today. was tearful.     He is currently doing well off oxygen vitals otherwise stable she understands that we could not complete the course of remdesivir and is okay with that  I took her off oxygen and she was saturating 94 Home oxygen evaluation was also plain  D-dimer was normal  At this point I am discharging her on dexamethasone to complete a 10-day course  She has been advised to come back to the emergency department if she has any increased shortness of breath  She voiced understanding and is in agreement    Discharge Medications:   Current Discharge Medication List      START taking these medications    Details   dexamethasone (DECADRON) 6 MG tablet Take 1 tablet by mouth daily for 8 days  Qty: 8 tablet, Refills: 0      guaiFENesin-dextromethorphan (ROBITUSSIN DM) 100-10 MG/5ML syrup Take 5 mLs by mouth every 4 hours as needed for Cough  Qty: 120 mL, Refills: 0           Current Discharge Medication List        Current Discharge Medication List      CONTINUE these medications which have NOT CHANGED    Details   ALPRAZolam (XANAX) 0.5 MG tablet Take 0.5 mg by mouth nightly as needed for Sleep.      melatonin 3 MG TABS tablet Take 3 mg by mouth nightly as needed      oxyCODONE (ROXICODONE) 5 MG immediate release tablet Take 5 mg by mouth every 4 hours as needed for Pain (Oxycodone 5 mg Immediately Q 4-6 Hours PRN).  Takes for cancer pain (phantom pain) where right rib lobectomy pain      !! ACCU-CHEK IRA PLUS strip TEST FOUR TIMES DAILY  Qty: 100 strip, Refills: 3      metFORMIN (GLUCOPHAGE) 1000 MG tablet TAKE 1 TABLET BY MOUTH TWICE DAILY WITH MEALS  Qty: 180 tablet, Refills: 3      buPROPion (WELLBUTRIN SR) 150 MG extended release tablet TAKE 1 TABLET BY MOUTH TWICE DAILY  Qty: 180 tablet, Refills: 1    Associated Diagnoses: Major depressive disorder, recurrent episode, mild (HCC)      atorvastatin (LIPITOR) 20 MG tablet TAKE 1 TABLET BY MOUTH EVERY DAY  Qty: 90 tablet, Refills: 1      ACCU-CHEK SOFTCLIX LANCETS MISC USE FOUR TIMES DAILY AS DIRECTED  Qty: 400 each, Refills: 2    Comments: **Patient requests 90 days supply**      !! Glucose Blood (ACCU-CHEK IRA PLUS VI) 1 each by In Vitro route 4 times daily Indications: Please dispense Accu-Chek Ira Plus Test Strips with DX code E11.9       Blood Glucose Monitoring Suppl (ACCU-CHEK IRA PLUS) w/Device KIT 1 each by Does not apply route daily Please dispense Accu-Chek Ana Laura Plus Meter with DX code E11.9  Qty: 1 kit, Refills: 0      albuterol sulfate  (90 Base) MCG/ACT inhaler Inhale 2 puffs into the lungs every 6 hours as needed for Wheezing  Qty: 1 Inhaler, Refills: 11      aspirin 81 MG chewable tablet Take 81 mg by mouth daily Indications: OTC      metoprolol succinate (TOPROL XL) 50 MG extended release tablet TAKE 1 TABLET BY MOUTH EVERY DAY  Qty: 90 tablet, Refills: 1      fluticasone-vilanterol (BREO ELLIPTA) 100-25 MCG/INH AEPB inhaler Inhale 1 puff into the lungs daily  Qty: 1 each, Refills: 5    Associated Diagnoses: COPD, moderate (HCC)      gabapentin (NEURONTIN) 100 MG capsule TAKE 1 CAPSULE BY MOUTH EVERY NIGHT AT BEDTIME  Qty: 90 capsule, Refills: 0    Associated Diagnoses: Primary cancer of right upper lobe of lung ([address] Hyperthyroidism       !! - Potential duplicate medications found. Please discuss with provider. Current Discharge Medication List      STOP taking these medications       guaiFENesin (MUCINEX) 600 MG extended release tablet Comments:   Reason for Stopping:         methIMAzole (TAPAZOLE) 10 MG tablet Comments:   Reason for Stopping:               Discharge ROS:  A complete review of systems was asked and negative    Discharge Exam:    BP (!) 142/52   Pulse 50   Temp 97 °F (36.1 °C) (Temporal)   Resp 17   Ht 5' (1.524 m)   Wt 121 lb 14.4 oz (55.3 kg)   SpO2 92%   BMI 23.81 kg/m²   General appearance:  NAD  HEENT:   Normal cephalic, atraumatic, moist mucous membranes, no oropharyngeal erythema or exudate  Heart[de-identified] Normal s1/s2, RRR, no murmurs, gallops, or rubs. No  leg edema  Lungs: Few bibasilar crepitations  Abdomen: Soft, non-tender, non-distended, bowel sounds present, no masses  Musculoskeletal:  No clubbing, no cyanosis,   Neurologic:  Neurovascularly intact without any focal sensory/motor deficits. Cranial nerves: II-XII intact, grossly non-focal.    Labs:  For convenience and continuity at follow-up the following

## 2020-10-12 NOTE — PROGRESS NOTES
Dr. Angela Rizzo responded to [employer] notification:  \"She will need outpatient testing after her COVID isolation needs to fu with pcp. Pt does not want to stay. \" Daughter and patient notified

## 2020-10-12 NOTE — TELEPHONE ENCOUNTER
----- Message from Gustavo Hair sent at 10/10/2020 10:23 AM EDT -----  Subject: Message to Provider    QUESTIONS  Information for Provider? Admitted at [facility]. She was admitted   for low O2 stats and testing positive for COVID. She wanted to update Dr. Mary Torres. ---------------------------------------------------------------------------  --------------  Phill BOSS  What is the best way for the office to contact you? OK to leave message on   voicemail  Preferred Call Back Phone Number? [phone]  ---------------------------------------------------------------------------  --------------  SCRIPT ANSWERS  Relationship to Patient?  Self

## 2020-10-12 NOTE — PLAN OF CARE
Problem: Falls - Risk of:  Goal: Will remain free from falls  Description: Will remain free from falls  10/11/2020 2108 by Rainer Hernandez RN  Outcome: Ongoing     Problem: Falls - Risk of:  Goal: Absence of physical injury  Description: Absence of physical injury  10/11/2020 2108 by Rainer Hernandez RN  Outcome: Ongoing     Problem: Gas Exchange - Impaired:  Goal: Levels of oxygenation will improve  Description: Levels of oxygenation will improve  10/11/2020 2108 by Rainer Hernandez RN  Outcome: Ongoing     Problem: Pain:  Goal: Pain level will decrease  Description: Pain level will decrease  10/11/2020 2108 by Rainer Hernandez RN  Outcome: Ongoing     Problem: Pain:  Goal: Control of acute pain  Description: Control of acute pain  10/11/2020 2108 by Rainer Hernandez RN  Outcome: Ongoing     Problem: Pain:  Goal: Control of chronic pain  Description: Control of chronic pain  10/11/2020 2108 by Rainer Hernandez RN  Outcome: Ongoing

## 2020-10-12 NOTE — PROGRESS NOTES
Patients left chest port deaccessed and patient tolerated well. Tele removed. Discharge instructions reviewed, patient denied any questions and stated understanding.

## 2020-10-12 NOTE — FLOWSHEET NOTE
Pt dismissed to home. Pt has been without oxygen for up to 7 hours and is concerned about night time need. Pt feels safe to go home without O2 tank. Advised to sit and rest until they get there. Pt has a number for [facility] to call when 30 minutes away from home. Called RT & SW to find tank and no was was told where it is. SW also came to talk to Pt. Pt discharged with belongings.

## 2020-10-12 NOTE — PROGRESS NOTES
Infectious Diseases   Progress Note      Admission Date: 10/10/2020  Hospital Day: Hospital Day: 3   Attending: Rufina Cordon MD  Date of service: 10/12/2020     Chief complaint/ Reason for consult:     · Acute respiratory failure with hypoxia  · COVID 19 pneumonia -positive COVID 19 test on 10/9/2020 at [facility]  · Atypical infiltrate on the left side on CT chest on 10/9/2020  · History of lung cancer  History of chemoradiation 5 days ago    Microbiology:        I have reviewed allavailable micro lab data and cultures    · Blood culture (2/2) - collected on 10/10/2020: Negative  · Urine Legionella pneumococcal antigen- collected on 10/10/2020: Negative      Antibiotics and immunizations:       Current antibiotics: All antibiotics and their doses were reviewed by me    Recent Abx Admin                   remdesivir 100 mg in sodium chloride 0.9 % 250 mL IVPB (mg) 100 mg New Bag 10/12/20 1247                  Immunization History: All immunization history was reviewed by me today. Immunization History   Administered Date(s) Administered    PPD Test 03/03/2008    Tdap (Boostrix, Adacel) 04/29/2013       Known drug allergies: All allergies were reviewed and updated    Allergies   Allergen Reactions    Codeine Anxiety       Social history:     Social History:  All social andepidemiologic history was reviewed and updated by me today as needed. · Tobacco use:   reports that she quit smoking about 5 years ago. Her smoking use included cigarettes. She has a 52.50 pack-year smoking history. She has never used smokeless tobacco.  · Alcohol use:   reports no history of alcohol use. · Currently lives in: [place] [address]  ·  has no history on file for drug.          Assessment:     The patient is a 69 y.o. old female who  has a past medical history of Antineoplastic chemotherapy induced anemia (11/12/2015), Anxiety, Benign essential hypertension, Cervical radiculitis (11/3/2017), Cervicalgia (11/3/2017), Chemotherapy induced nausea and vomiting (9/24/2015), Chronic fatigue syndrome, Chronic obstructive pulmonary disease (COPD) ([address] Contusion of left foot (3/8/2017), COPD, moderate ([address] (6/1/2016), Diabetes mellitus type II, controlled ([address] Encounter for chemotherapy management (10/8/2015), Hearing disorder, sensorineural (2/23/2017), Hyperlipidemia, Hyperthyroidism, Iron deficiency anemia, Left foot pain (2/22/2017), Lung cancer ([address] Lung mass, Malignant neoplasm of upper lobe of right lung ([address] (5/5/2015), Myofascial pain on right side (10/1/2015), Neoplasm related pain, Primary osteoarthritis of right hip (3/8/2017), Rotator cuff tendinitis (11/3/2017), Tinnitus (2/23/2017), Tobacco user, and Uncontrolled type 2 diabetes mellitus without complication, without long-term current use of insulin (3/11/2016). with following problems:    · Acute respiratory failure with hypoxia-improving  · COVID 19 pneumonia -positive COVID 19 test on 10/9/2020 at [facility] getting IV remdesivir -   · Atypical infiltrate on the left side on CT chest on 10/9/2020  · History of lung cancer  · History of chemoradiation 5 days ago  · COPD-shortness of breath improving  · Metabolic encephalopathy  · Type 2 diabetes mellitus-maintain good glycemic control  · Essential hypertension  · Mixed hyperlipidemia  · Ex-smoker      Discussion:      The patient is on IV remdesivir. Today is day 3 of remdesivir. Her oxygen saturation is improving. She has been alternating between 2 L and room air. Serum creatinine 0.7. Liver functions are okay. CK is 56. LDH is 212. Hemoglobin is 11.6. White cell count is 6300. D-dimer is less than 200. I had given her 2 units of convalescent plasma on Friday already. Chest x-ray from yesterday reviewed. Shows some atypical infiltrates.       Plan:     Diagnostic Workup:    · Continue to monitor LDH, d-dimer, procalcitonin, CRP every other day  · Continue to follow  fever curve, WBC count and blood cultures  · Follow up on liver and renal function    Antimicrobials:    · Will continue remdesivir 100 mg every 24 hour  · Plan is for total of 5-day course of remdesivir  · Continue Decadron 6 mg daily  · Plan for total of 10-day course of Decadron  · Maintain good glycemic control while on Decadron and aggressive  · Continue to watch for new fever or diarrhea  · Continue to watch for any productive cough or other signs of secondary bacterial infection  · Aspiration precautions  · Cough and deep wheezing exercises  · Anticoagulation per primary  · Discussed the above plan with patient and RN       Drug Monitoring:    · Continue monitoring for antibiotic toxicity as follows: CBC, CMP  · Continue to watch for following: new or worsening fever, new hypotension, hives, lip swelling and redness or purulence at vascular access sites. Current isolation precautions:    Currently active isolation(s): Droplet Plus     I/v access Management:    · Continue to monitor i.v access sites for erythema, induration,discharge or tenderness. · As always, continue efforts to minimize tubes/ lines/ drains as clinically appropriate to reduce chances of line associated infections. TIME SPENT TODAY:     - Spent over  35  minutes on visit (including interval history, physical exam, review of data including labs, cultures, imaging, development and implementation of treatment plan and coordination of complex care). Thank you for involving me in the care of your patient. I will continue to follow. If you have any additional questions, please do not hesitate to contact me. Subjective: Interval history: Interval history was obtained from chart review and RN. The patient is afebrile. She is tolerating remdesivir okay. She is tolerating Decadron okay.   He is on 2 L oxygen by nasal cannula     REVIEW OF SYSTEMS:     Review of Systems   Constitutional: Negative for chills, diaphoresis and unexpected weight change. HENT: Negative for congestion, ear discharge, ear pain, facial swelling, hearing loss, rhinorrhea and trouble swallowing. Eyes: Negative for photophobia, discharge, redness and visual disturbance. Respiratory: Positive for cough and shortness of breath. Negative for apnea, choking, chest tightness and stridor. Cardiovascular: Negative for chest pain and palpitations. Gastrointestinal: Negative for abdominal pain, blood in stool, diarrhea and nausea. Endocrine: Negative for polydipsia, polyphagia and polyuria. Genitourinary: Negative for difficulty urinating, dysuria, frequency, hematuria, menstrual problem and vaginal discharge. Musculoskeletal: Negative for arthralgias, joint swelling, myalgias and neck stiffness. Skin: Negative for color change and rash. Allergic/Immunologic: Negative for immunocompromised state. Neurological: Negative for dizziness, seizures, speech difficulty, light-headedness and headaches. Hematological: Negative for adenopathy. Psychiatric/Behavioral: Negative for agitation, hallucinations and suicidal ideas. Past Medical History: All past medical history reviewed today. Past Medical History:   Diagnosis Date    Antineoplastic chemotherapy induced anemia 11/12/2015    Anxiety     Benign essential hypertension     Cervical radiculitis 11/3/2017    Cervicalgia 11/3/2017    Chemotherapy induced nausea and vomiting 9/24/2015    Chronic fatigue syndrome     [facility] disease [facility]COPD) [facility])     Contusion of left foot 3/8/2017    COPD, moderate ([facility]) 6/1/2016    INTERPRETATION: Spirometry showed decreased FVC of 1.86 L, 77% predicted and decreased  FEV-1 of 1.16 L, 60% predicted. FEV-1/FVC ratio was decreased at 63%. No response to bronchodilators demonstrated on spirometry. Lung volumes showed increased total lung capacity of 141% predicted and residual volume of 241% predicted.  Diffusion capacity showed  decreased DLCO of 66% predicted. IMPRESSION: Mod    Diabetes mellitus type II, controlled ([facility]     Encounter for chemotherapy management 10/8/2015    Hearing disorder, sensorineural 2/23/2017    Hyperlipidemia     Hyperthyroidism     Iron deficiency anemia     Left foot pain 2/22/2017    Lung cancer ([facility]     Lung mass     R lung mass    Malignant neoplasm of upper lobe of right lung ([facility] 5/5/2015    Myofascial pain on right side 10/1/2015    Neoplasm related pain     Primary osteoarthritis of right hip 3/8/2017    Rotator cuff tendinitis 11/3/2017    Tinnitus 2/23/2017    Tobacco user     quit smoking in jan, 2015    Uncontrolled type 2 diabetes mellitus without complication, without long-term current use of insulin 3/11/2016       Past Surgical History: All past surgical history was reviewed today. Past Surgical History:   Procedure Laterality Date    BLADDER REPAIR  1972    tuck    HYSTERECTOMY  1972    THORACOTOMY Right 6/17/2015    Dr. Alea Neal - via VATS w/RUL for adenocarcinoma    TUNNELED VENOUS PORT PLACEMENT Left 7/17/15    Dr. Alea Neal  [facility] power injectable port - 8F (max 300psi; MRI conditional 3-[facility])       Family History: All family history was reviewed today. Problem Relation Age of Onset    Diabetes Father         & TB history    Hypertension Father     Diabetes Sister     Diabetes Brother     Stroke Son     Osteoporosis Mother         & Angina    Anesth Problems Neg Hx     Malig Hyperten Neg Hx     Hypotension Neg Hx     Malig Hypertherm Neg Hx     Pseudochol.  Deficiency Neg Hx        Objective:       PHYSICAL EXAM:      Vitals:   Vitals:    10/12/20 0818 10/12/20 0850 10/12/20 1243 10/12/20 1245   BP:  (!) 142/52  (!) 142/60   Pulse:  50  (!) 48   Resp: 18 17  18   Temp:  97 °F (36.1 °C)  96.8 °F (36 °C)   TempSrc:  Temporal  Temporal   SpO2:  92%  97%   Weight:       Height:   5' (1.524 m)        Physical Exam       PHYSICAL EXAM:     In-person bedside physical examination deferred. Pursuant to the emergency declaration under the [facility], [address] [place] [address] authority and the [employer] Act, this clinical encounter was conducted to provide necessary medical care. (Also consistent with new provisions and guidance offered by [facility] on March 18, 2020 in setting of COVID 19 outbreak and in order to preserve personal protective equipment in accordance with the flexibilities announced by CMS on March 30, 2020)   References: https://[facility]. [facility]/Portals/0/Resources/COVID-19/3_18%20Telemed%20Guidance%20Updated%20March%2018. pdf?[email or web address]                      https://[facility]. [facility]/Portals/0/Resources/COVID-19/3_18%20Telemed%20Guidance%20Updated%20March%2018. pdf?[email or web address]                      [email or web address] pdf                            General: Awake and oriented to time place and person according to primary service, vitals reviewed  HEENT: Deferred  Cardiovascular: Telemetry data reviewed, rest deferred   Pulmonary: deferred  Abdomen/GI: deferred  Neuro: deferred  Skin: deferred  Musculoskeletal:  deferred  Genitourinary: Deferred  Psych: deferred  Lymphatic/Immunologic: deferred    Intake and output:    I/O last 3 completed shifts: In: 250 [P.O.:240; I.V.:10]  Out: 1300 [Urine:1300]    Lab Data:   All available labs and old records have been reviewed by me.     CBC:  Recent Labs     10/10/20  0435 10/11/20  0456 10/12/20  0610   WBC 4.6 9.1 6.3   RBC 4.39 3.90* 3.99*   HGB 12.5 11.3* 11.6*   HCT 38.9 34.7* 35.6*    201 177   MCV 88.5 88.9 89.2   MCH 28.6 29.0 29.2   MCHC 32.3 32.6 32.7   RDW 14.8 15.1 15.3        BMP:  Recent Labs     10/10/20  0434 10/11/20  0456 10/12/20  0610    138 138   K 4.3 3.6 3.5   CL 99 99 101   CO2 31 31 30   BUN 13 23* 19   CREATININE 0.8 0.8 0.7 CALCIUM 9.3 8.6 8.7   GLUCOSE 197* 76 81        Hepatic Function Panel:   Lab Results   Component Value Date    ALKPHOS 90 10/12/2020    ALT 10 10/12/2020    AST 14 10/12/2020    PROT 6.5 10/12/2020    PROT 6.9 06/15/2017    BILITOT 0.3 10/12/2020    BILIDIR 0.3 07/17/2015    IBILI 0.8 07/17/2015    LABALBU 3.6 10/12/2020       CPK:   Lab Results   Component Value Date    CKTOTAL 56 10/12/2020     ESR:   Lab Results   Component Value Date    SEDRATE 36 (H) 06/08/2018     CRP:   Lab Results   Component Value Date    CRP 2.7 10/11/2020           Imaging: All pertinent images and reports for the current visit were reviewed by me during this visit. XR CHEST PORTABLE   Final Result   Mild scarring in the lung bases. Otherwise no acute cardiopulmonary disease. Medications: All current and past medications were reviewed.      sodium chloride flush  10 mL Intravenous 2 times per day    dexamethasone  6 mg Oral Daily    aspirin  81 mg Oral Daily    atorvastatin  20 mg Oral Daily    buPROPion  150 mg Oral BID    metoprolol succinate  50 mg Oral Daily    insulin glargine  0.25 Units/kg Subcutaneous Nightly    insulin lispro  0-12 Units Subcutaneous TID WC    insulin lispro  0-6 Units Subcutaneous Nightly    enoxaparin  30 mg Subcutaneous BID    remdesivir IVPB  100 mg Intravenous Q24H    budesonide-formoterol  2 puff Inhalation BID        dextrose         ibuprofen, sodium chloride flush, acetaminophen **OR** acetaminophen, polyethylene glycol, promethazine **OR** ondansetron, potassium chloride **OR** potassium alternative oral replacement **OR** potassium chloride, magnesium sulfate, guaiFENesin-dextromethorphan, glucose, dextrose, glucagon (rDNA), dextrose, melatonin, hydrALAZINE, oxyCODONE, sodium chloride, albuterol, albuterol sulfate HFA      Problem list:       Patient Active Problem List   Diagnosis Code    Essential hypertension I10    Hyperthyroidism E05.90    Major depression, recurrent (HCC) F33.9    Chronic obstructive pulmonary disease (HCC) J44.9    Carpal tunnel syndrome of right wrist G56.01    Lung cancer (HCC) C34.90    Type 2 diabetes mellitus with other specified complication ([address] W40.79    Acute respiratory failure ([address] J96.00    COVID-19 U07.1    Pneumonia due to COVID-19 virus U07.1, J12.89    History of lung cancer Z85. 80    Ex-smoker Z87.891    Chronic obstructive pulmonary disease with acute lower respiratory infection ([address] B36.2    Metabolic encephalopathy L00.96    Mixed hyperlipidemia E78.2       Please note that this chart was generated using Dragon dictation software. Although every effort was made to ensure the accuracy of this automated transcription, some errors in transcription may have occurred inadvertently. If you may need any clarification, please do not hesitate to contact me through EPIC or at the phone number provided below with my electronic signature. Any pictures or media included in this note were obtained after taking informed verbal consent from the patient and with their approval to include those in the patient's medical record.     Brenda Li MD, MPH  10/12/2020 , 3:46 PM   [facility]   [address] [place] [address], [address] [place] [address]  Office: [phone]  Fax: [phone]  Clinic days:  Tuesday & Thursday

## 2020-10-12 NOTE — CARE COORDINATION
Discharge planning note:    Chart reviewed and it appears that patient has minimal needs for discharge at this time. Discussed with patients nurse and requested that case management be notified if discharge needs are identified. Discharge order on chart. Low risk for re-hospitalization (11%). Case management will continue to follow progress and update discharge plan as needed.     Jacobo Skinner RN BSN  Case Management  [phone]

## 2020-10-12 NOTE — PROGRESS NOTES
Occupational Therapy/Physical [address]    Per discussion with pt, will d/c OT/PT orders at this time - pt reports independence with ambulation and ADL. She has been up ad kwaku in room and has no concerns for discharge to home with no needs for equipment or therapy. Please re-order if a change in status occurs.     Thank you,  Garry Amin, GIOVANY OTR/L [id number]  Randi Chisholm, PT, DPT, [id number]

## 2020-10-12 NOTE — PROGRESS NOTES
Shift assessment completed and documented. Fall precautions in place, hourly rounding, call light and belongings in reach, bed in lowest position, wheels locked in place, side rails up x 2, walkways free of clutter. Patient A&O x4, independent, able to make needs known. Reminded to use call light for assistance. States she is in slight pain but not enough for pain medication right now. Will continue to monitor.

## 2020-10-12 NOTE — PROGRESS NOTES
CLINICAL PHARMACY NOTE: MEDS TO [email or web address] Patient?: No  Total # of Prescriptions Filled: 1   The following medications were delivered to the patient:  · Dexamethasone 6mg  Total # of Interventions Completed: 0  Time Spent (min): 15    Additional Documentation:  [facility]

## 2020-10-12 NOTE — CARE COORDINATION
Discharge Note:    Referral for Home O2 has been sent to [facility] and they will be bringing a portable O2 tank to bedside and arranging for Home O2 services to be set up at home this evening. Just waiting for time they will be able to arrive with tank at hospital.    PCP list provided to nurse for patient. UPDATE:  Blanca Wan and had to leave message about portable tank. Patient can no longer wait as ride is here.     Ivett Ann RN BSN  Case Management  [phone]

## 2020-10-12 NOTE — PROGRESS NOTES
Nutrition Assessment     Type and Reason for Visit: Initial, Positive Nutrition Screen(MST=2)    Nutrition Recommendations/Plan:   No nutrition concerns at this time. Nutrition Assessment:  Pt's nutritional status seems adequate at this time. Recorded PO intake %. No significant wt loss per EMR. No wounds present. Will follow at low risk. Consult RD if nutrition status changes. Malnutrition Assessment:  Malnutrition Status: No malnutrition    Nutrition Related Findings: Generalized trace edema. Trace BLE edema. Glucose: 79. Active BS. Current Nutrition Therapies:    DIET CARB CONTROL; Anthropometric Measures:  · Height: 5' (152.4 cm)  · Current Body Wt: 121 lb (54.9 kg)   · BMI: 23.6    Nutrition Diagnosis:   No nutrition diagnosis at this time    Nutrition Interventions:   Food and/or Nutrient Delivery:  Continue Current Diet  Nutrition Education/Counseling:  Education not indicated   Coordination of Nutrition Care:  Continued Inpatient Monitoring    Goals:  Continued PO intake greater than 50% at all meals.        Nutrition Monitoring and Evaluation:   Food/Nutrient Intake Outcomes:  Food and Nutrient Intake    Discharge Planning:    No discharge needs at this time     Electronically signed by Enrique Love RD, LD on 10/12/20 at 12:51 PM EDT    Contact: [phone]

## 2020-10-12 NOTE — PROGRESS NOTES
Shift assessment completed and documented. Patient alert and oriented with complaints of pain 7/10. Prn oxycodone given. Call light in reach, bed in lowest position and locked, will continue to monitor.

## 2020-10-13 ENCOUNTER — TELEPHONE (OUTPATIENT)
Dept: INTERNAL MEDICINE CLINIC | Age: 70
End: 2020-10-13

## 2020-10-13 NOTE — TELEPHONE ENCOUNTER
----- Message from Divina Gutierrez sent at 10/13/2020  4:48 PM EDT -----  Subject: Message to Provider    QUESTIONS  Information for Provider? Patient would like a call back regarding her   covid test she can see it in my chart but it does not say positive or   negative if someone can call patient back and discuss the results with   patient.   ---------------------------------------------------------------------------  --------------  CALL BACK INFO  What is the best way for the office to contact you? OK to leave message on   voicemail  Preferred Call Back Phone Number? [phone]  ---------------------------------------------------------------------------  --------------  SCRIPT ANSWERS  Relationship to Patient?  Self

## 2020-10-13 NOTE — CARE COORDINATION
Spoke with patient on the telephone and made sure she received her O2 last evening and she confirmed that she did. Ba Gill said they needed chart documentation or she was going to have to pay. I have a call in to [address] to see what they need from the chart. Spoke with daughter Angel Salgado and let her know I will take care of paperwork so mother will not be billed.     Isadora Lundberg RN BSN  Case Management  [phone]

## 2020-10-14 LAB
BLOOD CULTURE, ROUTINE: NORMAL
CULTURE, BLOOD 2: NORMAL

## 2020-10-14 NOTE — TELEPHONE ENCOUNTER
I returned the call. I LM on VM to . She DOES have a positive COVID-19 test.  It is OK to inform the patient.      Antonio Yo MD  FACP

## 2020-10-30 ENCOUNTER — OFFICE VISIT (OUTPATIENT)
Dept: PULMONOLOGY | Age: 70
End: 2020-10-30
Payer: MEDICARE

## 2020-10-30 VITALS — SYSTOLIC BLOOD PRESSURE: 138 MMHG | DIASTOLIC BLOOD PRESSURE: 78 MMHG | OXYGEN SATURATION: 94 % | HEART RATE: 81 BPM

## 2020-10-30 PROBLEM — J96.11 CHRONIC HYPOXEMIC RESPIRATORY FAILURE (HCC): Status: ACTIVE | Noted: 2020-10-30

## 2020-10-30 PROBLEM — J43.2 CENTRILOBULAR EMPHYSEMA (HCC): Status: ACTIVE | Noted: 2020-10-30

## 2020-10-30 PROCEDURE — G8484 FLU IMMUNIZE NO ADMIN: HCPCS | Performed by: INTERNAL MEDICINE

## 2020-10-30 PROCEDURE — 1111F DSCHRG MED/CURRENT MED MERGE: CPT | Performed by: INTERNAL MEDICINE

## 2020-10-30 PROCEDURE — 1090F PRES/ABSN URINE INCON ASSESS: CPT | Performed by: INTERNAL MEDICINE

## 2020-10-30 PROCEDURE — 1123F ACP DISCUSS/DSCN MKR DOCD: CPT | Performed by: INTERNAL MEDICINE

## 2020-10-30 PROCEDURE — 99214 OFFICE O/P EST MOD 30 MIN: CPT | Performed by: INTERNAL MEDICINE

## 2020-10-30 PROCEDURE — 3017F COLORECTAL CA SCREEN DOC REV: CPT | Performed by: INTERNAL MEDICINE

## 2020-10-30 PROCEDURE — 1036F TOBACCO NON-USER: CPT | Performed by: INTERNAL MEDICINE

## 2020-10-30 PROCEDURE — G8399 PT W/DXA RESULTS DOCUMENT: HCPCS | Performed by: INTERNAL MEDICINE

## 2020-10-30 PROCEDURE — G8420 CALC BMI NORM PARAMETERS: HCPCS | Performed by: INTERNAL MEDICINE

## 2020-10-30 PROCEDURE — 3023F SPIROM DOC REV: CPT | Performed by: INTERNAL MEDICINE

## 2020-10-30 PROCEDURE — G8427 DOCREV CUR MEDS BY ELIG CLIN: HCPCS | Performed by: INTERNAL MEDICINE

## 2020-10-30 PROCEDURE — 4040F PNEUMOC VAC/ADMIN/RCVD: CPT | Performed by: INTERNAL MEDICINE

## 2020-10-30 PROCEDURE — G8926 SPIRO NO PERF OR DOC: HCPCS | Performed by: INTERNAL MEDICINE

## 2020-10-30 NOTE — PROGRESS NOTES
[facility]  Progress Note  Gerber Godinez MD       [address] [place]   YOB: 1950    Date of Visit:  10/30/2020    Assessment/Plan:  1. COPD, moderate (HCC)/centrilobular emphysema  PFT 5/16:  INTERPRETATION: Spirometry showed decreased FVC of 1.86 L, 77% predicted and  decreased   FEV-1 of 1.16 L, 60% predicted. FEV-1/FVC ratio was decreased at 63%. No  response to bronchodilators demonstrated on spirometry. Lung volumes showed  increased total lung capacity of 141% predicted and residual volume of 241%  predicted. Diffusion capacity showed   decreased DLCO of 66% predicted.       IMPRESSION: Moderate obstructive defect on spirometry with no response to  bronchodilators. Both air trapping and hyperinflation were present on lung  volumes and moderate decrease in diffusion capacity. In comparison to the  test that was done in February of 2015, [facility] has decreased by 11%, FEV-1 by  23% with no significant change in total lung capacity or DLCO. · Breo really helped but her insurance does not cover this and she is not taking it  · Taking only albuterol  · Try her on Stiolto    2. Malignant neoplasm of upper lobe of right lung (HCC)  · S/p RULobectomy  · She did have CT imaging in June of this year and I was able to review those images. · This revealed evidence of a small pulmonary nodule in the right upper lobe about 3 mm in diameter. · She also had CT imaging around the time of her Covid diagnosis last month. This was done at an outside facility  · I can only see the report. This does describe typical changes of Covid pneumonia and what sounded like a stable pulmonary nodule  · Repeat CT imaging around December of this year to follow-up Covid infiltrates and make sure the nodule is stable. · Additionally, CT imaging revealed evidence of centrilobular emphysema    3.   COVID-19 pneumonia  · Treated with a course of remdesivir and Decadron  · Still has nightly as needed  Historical MD Martha   oxyCODONE (ROXICODONE) 5 MG immediate release tablet Take 5 mg by mouth every 4 hours as needed for Pain (Oxycodone 5 mg Immediately Q 4-6 Hours PRN). Takes for cancer pain (phantom pain) where right rib lobectomy pain  Historical ProviderMD Goldstein Real strip TEST FOUR TIMES DAILY  Jeet Del Rosario MD   metFORMIN (GLUCOPHAGE) 1000 MG tablet TAKE 1 TABLET BY MOUTH TWICE DAILY WITH MEALS  Jeet Del Rosario MD   metoprolol succinate (TOPROL XL) 50 MG extended release tablet TAKE 1 TABLET BY MOUTH EVERY DAY  Jeet Del Rosario MD   buPROPion [facility] SR) 150 MG extended release tablet TAKE 1 TABLET BY MOUTH TWICE DAILY  Jeet Del Rosario MD   atorvastatin (LIPITOR) 20 MG tablet TAKE 1 TABLET BY MOUTH EVERY DAY  Jeet Del Rosario MD   ACCU-CHEK SOFTCLIX LANCETS MISC USE FOUR TIMES DAILY AS DIRECTED  Jeet Del Rosario MD   Glucose Blood (ACCU-CHEK ANA LAURA PLUS VI) 1 each by In Vitro route 4 times daily Indications: Please dispense Accu-Chek Ana Laura Plus Test Strips with DX code E11.9   Historical Provider, MD   Blood Glucose Monitoring Suppl (ACCU-CHEK ANA LAURA PLUS) w/Device KIT 1 each by Does not apply route daily Please dispense Accu-Chek Ana Laura Plus Meter with DX code E11.9  Jeet Del Rosario MD   fluticasone-vilanterol (BREO ELLIPTA) 100-25 MCG/INH AEPB inhaler Inhale 1 puff into the lungs daily  Jeet Del Rosario MD   albuterol sulfate  (90 Base) MCG/ACT inhaler Inhale 2 puffs into the lungs every 6 hours as needed for Wheezing  Wesly Mcgee MD   gabapentin (NEURONTIN) 100 MG capsule TAKE 1 CAPSULE BY MOUTH EVERY NIGHT AT BEDTIME  Patient taking differently: TAKE 1 CAPSULE BY MOUTH THREE TIMES DAILY  Bakari Coles MD   aspirin 81 MG chewable tablet Take 81 mg by mouth daily Indications: OTC  Historical Provider, MD       Vitals:    10/30/20 1526   BP: 138/78   Pulse: 81   SpO2: 94%     There is no height or weight on file to calculate BMI.      Wt Readings from Last 3 Encounters:   10/12/20 121 lb 14.4 oz (55.3 kg)   10/05/20 126 lb (57.2 kg)   20 129 lb (58.5 kg)     BP Readings from Last 3 Encounters:   10/30/20 138/78   10/12/20 (!) 142/60   10/05/20 128/70        Social History     Tobacco Use   Smoking Status Former Smoker    Packs/day: 1.50    Years: 35.00    Pack years: 52.50    Types: Cigarettes    Last attempt to quit: 2015    Years since quittin.8   Smokeless Tobacco Never Used

## 2020-11-06 ENCOUNTER — TELEPHONE (OUTPATIENT)
Dept: INTERNAL MEDICINE CLINIC | Age: 70
End: 2020-11-06

## 2020-11-06 RX ORDER — ATORVASTATIN CALCIUM 20 MG/1
TABLET, FILM COATED ORAL
Qty: 90 TABLET | Refills: 1 | Status: SHIPPED | OUTPATIENT
Start: 2020-11-06 | End: 2021-02-17

## 2020-11-06 NOTE — TELEPHONE ENCOUNTER
Pts insurance [facility] calling to request a change in metFORMIN (GLUCOPHAGE) 1000 MG. Pt told [facility] that her metFORMIN (GLUCOPHAGE) 1000 MG was changed from BID to QD due to episodes of hypoglycemia about a year ago.

## 2020-11-09 ENCOUNTER — TELEPHONE (OUTPATIENT)
Dept: PHARMACY | Facility: CLINIC | Age: 70
End: 2020-11-09

## 2020-11-09 NOTE — TELEPHONE ENCOUNTER
CLINICAL PHARMACY: ADHERENCE REVIEW  Identified care gap per Jose; fills at [facility]: Diabetes and Statin adherence    Last Office Visit: 10/5/2020 PCP    ASSESSMENT  DIABETES ADHERENCE  PDC: 81%    Per [address]   Metformin 1000 mg last filled on 11/6/2020 for a 90 day supply currently ready for ; Note SIG recently updated from BID with meals to daily with breakfast.   3 refills remaining. Billed through [facility]    Lab Results   Component Value Date    LABA1C 7.4 10/10/2020    LABA1C 7.3 10/05/2020    LABA1C 7.6 03/11/2020       STATIN ADHERENCE  PDC: n/a Patient calculated to fail this adherence measure for 2020. Per [facility] Records   Atorvastatin 20 mg last filled on 2/17/2020 for a 90 day supply. Per [facility]  Atorvastatin 20 mg last filled on 11/6/2020 for a 90 day supply and ready for . Billed [facility]    Lab Results   Component Value Date    CHOL 185 03/11/2020    TRIG 240 (H) 03/11/2020    HDL 47 03/11/2020    LDLCALC 90 03/11/2020    LDLDIRECT 78 08/23/2017     ALT   Date Value Ref Range Status   10/12/2020 10 10 - 40 U/L Final     AST   Date Value Ref Range Status   10/12/2020 14 (L) 15 - 37 U/L Final     The 10-year ASCVD risk score (Kassie Alvarez, et al., 2013) is: 22.2%    Values used to calculate the score:      Age: 69 years      Sex: Female      Is Non- : No      Diabetic: Yes      Tobacco smoker: No      Systolic Blood Pressure: 830 mmHg      Is BP treated: No      HDL Cholesterol: 47 mg/dL      Total Cholesterol: 185 mg/dL     PLAN  Attempting to reach patient to review.  Left message asking for return call.     William Hines, PharmD, [place], [address]  [facility] Pharmacist  O: [phone]  Department, toll free: [phone], option 7

## 2020-11-09 NOTE — TELEPHONE ENCOUNTER
Volodymyr Doll returned call. Informed her prescriptions ready for pick-up. Thankful for call as [facility] pharmacy had not called her yet. No further outreach planned at this time. Maria Del Carmen Gonzalez, PharmD, [address], [facility]  [facility] Pharmacist  O: [phone]  Department, toll free: [phone], option 7     CLINICAL PHARMACY CONSULT: MED RECONCILIATION/REVIEW ADDENDUM  For Pharmacy Admin Tracking Only  PHSO: Yes  Total # of Interventions Recommended: 2  - New Order #: 0 New Medication Order Reason(s):  Adherence  - Refills Provided #: 0  - Maintenance Safety Lab Monitoring #: 1  - New Therapy Lab Monitoring #: 1  Recommended intervention potential cost savings: 1  Total Interventions Accepted: 0  Time Spent (min): 10

## 2020-11-27 ENCOUNTER — OFFICE VISIT (OUTPATIENT)
Dept: PRIMARY CARE CLINIC | Age: 70
End: 2020-11-27
Payer: MEDICARE

## 2020-11-27 PROCEDURE — G8428 CUR MEDS NOT DOCUMENT: HCPCS | Performed by: NURSE PRACTITIONER

## 2020-11-27 PROCEDURE — 99211 OFF/OP EST MAY X REQ PHY/QHP: CPT | Performed by: NURSE PRACTITIONER

## 2020-11-27 PROCEDURE — G8420 CALC BMI NORM PARAMETERS: HCPCS | Performed by: NURSE PRACTITIONER

## 2020-11-27 NOTE — PATIENT INSTRUCTIONS

## 2020-11-27 NOTE — PROGRESS NOTES
[address] received a viral test for COVID-19. They were educated on isolation and quarantine as appropriate. For any symptoms, they were directed to seek care from their PCP, given contact information to establish with a doctor, directed to an urgent care or the emergency room.

## 2020-11-28 LAB — SARS-COV-2: NOT DETECTED

## 2020-12-02 ENCOUNTER — HOSPITAL ENCOUNTER (OUTPATIENT)
Dept: PULMONOLOGY | Age: 70
Discharge: HOME | End: 2020-12-02
Payer: MEDICARE

## 2020-12-02 VITALS — OXYGEN SATURATION: 96 %

## 2020-12-02 PROCEDURE — 94729 DIFFUSING CAPACITY: CPT

## 2020-12-02 PROCEDURE — 94060 EVALUATION OF WHEEZING: CPT

## 2020-12-02 PROCEDURE — 6370000000 HC RX 637 (ALT 250 FOR IP): Performed by: INTERNAL MEDICINE

## 2020-12-02 PROCEDURE — 94200 LUNG FUNCTION TEST (MBC/MVV): CPT

## 2020-12-02 PROCEDURE — 94010 BREATHING CAPACITY TEST: CPT

## 2020-12-02 PROCEDURE — 94726 PLETHYSMOGRAPHY LUNG VOLUMES: CPT

## 2020-12-02 PROCEDURE — 94760 N-INVAS EAR/PLS OXIMETRY 1: CPT

## 2020-12-02 RX ORDER — ALBUTEROL SULFATE 90 UG/1
4 AEROSOL, METERED RESPIRATORY (INHALATION) ONCE
Status: COMPLETED | OUTPATIENT
Start: 2020-12-02 | End: 2020-12-02

## 2020-12-02 RX ADMIN — Medication 4 PUFF: at 12:39

## 2020-12-03 NOTE — PROCEDURES
Pulmonary Function Testing      Patient name:  Keith Hernandez     [facility]   [id number]   Date of test: 12/2/2020  Date of interpretation:   12/3/2020    Ms. Keith Hernandez is a 69y.o. year-old current smoker. The spirometry data were acceptable and reproducible. Spirometry:  Flow volume loops were obstructed. The FEV-1/FVC ratio was decreased. The  post-bronchodilator FEV-1 was 1.22 liters (65% of predicted), which was moderately decreased. The FVC was 1.94 liters (79% of predicted), which was decreased. Response to inhaled bronchodilators (albuterol) was not significant. Lung volumes:  Lung volumes were tested by plethysmography. The total lung capacity was 4.04 liters (101% of predicted), which was normal. The residual volume was 2.16 liters (134% of predicted), which was increased. The ratio of residual volume to total lung capacity (RV/TLC) was 132%, which was increased. Specific airway resistance was increased. Diffusion capacity was found to be decreased. In comparison to the study done in 2016 there has been a decrease in FEV1, total lung capacity and diffusion capacity with stable FVC. Interpretation:  Progressive moderate obstructive airway disease with evidence of air trapping.

## 2020-12-07 ENCOUNTER — VIRTUAL VISIT (OUTPATIENT)
Dept: PULMONOLOGY | Age: 70
End: 2020-12-07
Payer: MEDICARE

## 2020-12-07 PROCEDURE — 99214 OFFICE O/P EST MOD 30 MIN: CPT | Performed by: INTERNAL MEDICINE

## 2020-12-07 PROCEDURE — G8427 DOCREV CUR MEDS BY ELIG CLIN: HCPCS | Performed by: INTERNAL MEDICINE

## 2020-12-07 PROCEDURE — 4040F PNEUMOC VAC/ADMIN/RCVD: CPT | Performed by: INTERNAL MEDICINE

## 2020-12-07 PROCEDURE — 3017F COLORECTAL CA SCREEN DOC REV: CPT | Performed by: INTERNAL MEDICINE

## 2020-12-07 PROCEDURE — G8399 PT W/DXA RESULTS DOCUMENT: HCPCS | Performed by: INTERNAL MEDICINE

## 2020-12-07 PROCEDURE — 1123F ACP DISCUSS/DSCN MKR DOCD: CPT | Performed by: INTERNAL MEDICINE

## 2020-12-07 PROCEDURE — 1090F PRES/ABSN URINE INCON ASSESS: CPT | Performed by: INTERNAL MEDICINE

## 2020-12-07 NOTE — PROGRESS NOTES
[facility]  Progress Note  Jennifer Escobar MD       [address] [facility]   YOB: 1950    Date of Visit:  12/7/2020    Assessment/Plan:  1. COPD, moderate (HCC)/centrilobular emphysema  PFT 12/2020: . Spirometry:   Flow volume loops were obstructed. The FEV-1/FVC ratio was   decreased. The  post-bronchodilator FEV-1 was 1.22 liters (65% of   predicted), which was moderately decreased. The FVC was 1.94   liters (79% of predicted), which was decreased. Response to   inhaled bronchodilators (albuterol) was not significant. Lung volumes:   Lung volumes were tested by plethysmography. The total lung   capacity was 4.04 liters (101% of predicted), which was normal.   The residual volume was 2.16 liters (134% of predicted), which   was increased. The ratio of residual volume to total lung   capacity (RV/TLC) was 132%, which was increased. Specific airway   resistance was increased. Diffusion capacity was found to be decreased.       In comparison to the study done in 2016 there has been a decrease   in FEV1, total lung capacity and diffusion capacity with stable   FVC. Interpretation:   Progressive moderate obstructive airway disease with evidence of   air trapping. · Stiolto is $45.  · She may qualify for financial aid  · She is going to follow up on this. 2. Malignant neoplasm of upper lobe of right lung ([facility] [address]  · S/p RULobectomy  · She did have CT imaging in June of this year and I was able to review those images. · This revealed evidence of a small pulmonary nodule in the right upper lobe about 3 mm in diameter. · She also had CT imaging around the time of her Covid diagnosis last month. This was done at an outside facility  · I can only see the report.   This does describe typical changes of Covid pneumonia and what sounded like a stable pulmonary nodule  · Repeat CT imaging around December of this year to follow-up Covid infiltrates and make sure the nodule is stable. · Additionally, CT imaging revealed evidence of centrilobular emphysema  · I placed a new order for CT scan of the chest.    3.  COVID-19 pneumonia  · Feels like she has recovered from this    4. Chronic hypoxemic respiratory failure  · Reports marginal saturations  · Desaturates with exercise  · Would benefit from a portable concentrator. She is independently mobile within the home  · Would benefit from wearing oxygen with sleep  · Reinforced all of this with her        FOLLOW UP: 6 months      Chief Complaint   Patient presents with    Shortness of Breath     had her PFT done 20 Only wearing O2 prn Hoping PFT shows she doesn't need it    Discuss Medications     never got Stiolot because it is 45.00 a month but she relizes this is probably the cheapest she is going to get so will think about getting it filled       HPI  The patient presents with a chief complaint of moderate shortness of breath related to Moderate COPD of many years duration. He has mild associated cough. Exertion is a modifying factor. She feels stress is a factor as well. Her 28year old grandson recently  of cancer. She also had COVID when she presented to the ER 10/10/20. Additionally, the patient does have a history of prior lung cancer surgery with right upper lobectomy. Review of Systems  No complaint of chest pain, nausea or vomiting. Physical Exam:  Well developed, well nourished  Alert and oriented  Sclera is clear  No cervical adenopathy  No JVD. Chest examination is wheezing. Cardiac examination reveals regular rate and rhythm without murmur, gallop or rub. The abdomen is soft, nontender and nondistended. There is no clubbing, cyanosis or edema of the extremities. There is no obvious skin rash. No focal neuro deficicts  Normal mood and affect    Allergies   Allergen Reactions    Codeine Anxiety     Prior to Visit Medications    Medication Sig Taking?  Authorizing Provider Karon Rivero MD   aspirin 81 MG chewable tablet Take 81 mg by mouth daily Indications: OTC  Historical Provider, MD       There were no vitals filed for this visit. There is no height or weight on file to calculate BMI.      Wt Readings from Last 3 Encounters:   10/12/20 121 lb 14.4 oz (55.3 kg)   10/05/20 126 lb (57.2 kg)   20 129 lb (58.5 kg)     BP Readings from Last 3 Encounters:   10/30/20 138/78   10/12/20 (!) 142/60   10/05/20 128/70        Social History     Tobacco Use   Smoking Status Former Smoker    Packs/day: 1.50    Years: 35.00    Pack years: 52.50    Types: Cigarettes    Last attempt to quit: 2015    Years since quittin.9   Smokeless Tobacco Never Used

## 2020-12-22 RX ORDER — METOPROLOL SUCCINATE 50 MG/1
TABLET, EXTENDED RELEASE ORAL
Qty: 90 TABLET | Refills: 1 | Status: SHIPPED | OUTPATIENT
Start: 2020-12-22 | End: 2021-07-06

## 2021-01-17 DIAGNOSIS — F41.9 ANXIETY: Primary | ICD-10-CM

## 2021-01-18 RX ORDER — ALPRAZOLAM 0.5 MG/1
TABLET ORAL
Qty: 90 TABLET | Refills: 2 | Status: SHIPPED | OUTPATIENT
Start: 2021-01-18 | End: 2021-10-14

## 2021-01-27 ENCOUNTER — TELEPHONE (OUTPATIENT)
Dept: PULMONOLOGY | Age: 71
End: 2021-01-27

## 2021-01-27 NOTE — TELEPHONE ENCOUNTER
Called [facility] in [place], [place] and spoke with the answering service and told will get a call back

## 2021-01-27 NOTE — TELEPHONE ENCOUNTER
Pt called in stating that she spoke with [address] about the [address] we ordered for her in October and was told that we did not check the box to order the POC. Pt stated she has been getting the run around from [address], asking what we can do.      Pt # [id number]

## 2021-01-27 NOTE — TELEPHONE ENCOUNTER
Did not receive call back Tired to call again and got answering service again. They put in another request to have someone call us back from the branch in [place].

## 2021-02-01 NOTE — TELEPHONE ENCOUNTER
Called [facility] back today and spoke with a Chelly Peñaloza. Was told that we need to send order stating \"Titrate for conserving device\" as pt is on continuous flow currently. Faxed this to [phone] pt informed of what is going on now.

## 2021-03-02 NOTE — TELEPHONE ENCOUNTER
Patient called to check on status of conserving device stating that [facility] has not contacted her. I called [facility], verified they have the order, gave them the oxygen flow of 3 litres, verified patient's phone number and they said they would contact her. Called patient and gave them [facility] number to call if she had not heard from them by tomorrow.

## 2021-03-12 ENCOUNTER — HOSPITAL ENCOUNTER (OUTPATIENT)
Dept: CT IMAGING | Age: 71
Discharge: HOME | End: 2021-03-12
Payer: MEDICARE

## 2021-03-12 DIAGNOSIS — C34.2 MALIGNANT NEOPLASM OF MIDDLE LOBE OF RIGHT LUNG (HCC): ICD-10-CM

## 2021-03-12 PROCEDURE — 71250 CT THORAX DX C-: CPT

## 2021-03-15 ENCOUNTER — TELEPHONE (OUTPATIENT)
Dept: PULMONOLOGY | Age: 71
End: 2021-03-15

## 2021-03-16 NOTE — TELEPHONE ENCOUNTER
Called [facility] earlier today and got their answering service and I left a message for [address] to call me back and let us know if 12-7-20 office visit will work for pt or if she needs updated office note

## 2021-03-18 ENCOUNTER — NURSE TRIAGE (OUTPATIENT)
Dept: OTHER | Facility: CLINIC | Age: 71
End: 2021-03-18

## 2021-03-18 NOTE — TELEPHONE ENCOUNTER
Patient called Beto Cabezas at [facility])  with red flag complaint. Brief description of triage: see below    Triage indicates for patient to go to ED now. Pt's daughter stated pt wouldn't go to ED. Again made the recommendation for pt to go to ED based on the severity of her symptoms. Pt's daughter stated she would call back in the morning. Writer recommended pt's daughter at least try to get her Mom to go to ED and call 911 if needed. Care advice provided, patient verbalizes understanding; denies any other questions or concerns; instructed to call back for any new or worsening symptoms. Attention Provider: Thank you for allowing me to participate in the care of your patient. The patient was connected to triage in response to information provided to the ECC. Please do not respond through this encounter as the response is not directed to a shared pool. Reason for Disposition   [1] SEVERE pain (e.g., excruciating) AND [2] present > 1 hour    Answer Assessment - Initial Assessment Questions  1. LOCATION: \"Where does it hurt? \"       Pt's daughter stated pain initially started in her back  And now is in her abdomen; pt holding her abdomen up high just below the diaphragm    2. RADIATION: \"Does the pain shoot anywhere else? \" (e.g., chest, back)      Pt denies    3. ONSET: \"When did the pain begin? \" (e.g., minutes, hours or days ago)       Last night    4. SUDDEN: \"Gradual or sudden onset? \"      Pt's daughter stated the back pain came on gradually and had her crying on the floor and abdominal pain came on suddenly after getting up off the floor    5. PATTERN \"Does the pain come and go, or is it constant? \"     - If constant: \"Is it getting better, staying the same, or worsening? \"       (Note: Constant means the pain never goes away completely; most serious pain is constant and it progresses)      - If intermittent: \"How long does it last?\" \"Do you have pain now? \"      (Note: Intermittent means the pain goes away completely between bouts)      Heightening cramps that turn into pain    6. SEVERITY: \"How bad is the pain? \"  (e.g., Scale 1-10; mild, moderate, or severe)    - MILD (1-3): doesn't interfere with normal activities, abdomen soft and not tender to touch     - MODERATE (4-7): interferes with normal activities or awakens from sleep, tender to touch     - SEVERE (8-10): excruciating pain, doubled over, unable to do any normal activities       8/10    7. RECURRENT SYMPTOM: \"Have you ever had this type of abdominal pain before? \" If so, ask: \"When was the last time? \" and \"What happened that time? \"       Pt's daughter denies; stated she gets cramps with diverticulitis, but nothing like this    8. CAUSE: \"What do you think is causing the abdominal pain? \"      Not known    9. RELIEVING/AGGRAVATING FACTORS: \"What makes it better or worse? \" (e.g., movement, antacids, bowel movement)      No relieving factors; smelled food and vomited     10. OTHER SYMPTOMS: \"Has there been any vomiting, diarrhea, constipation, or urine problems? \"        Denies blood in stool and denies discolored stool; pt with intermittent constipation and losing weight rapidly; fatigue; pt vomiting as well    11. PREGNANCY: \"Is there any chance you are pregnant? \" \"When was your last menstrual period? \"        N/A    Protocols used: ABDOMINAL PAIN - [facility]

## 2021-04-05 ENCOUNTER — TELEPHONE (OUTPATIENT)
Dept: INTERNAL MEDICINE CLINIC | Age: 71
End: 2021-04-05

## 2021-04-05 ENCOUNTER — OFFICE VISIT (OUTPATIENT)
Dept: INTERNAL MEDICINE CLINIC | Age: 71
End: 2021-04-05
Payer: MEDICARE

## 2021-04-05 VITALS
HEART RATE: 77 BPM | SYSTOLIC BLOOD PRESSURE: 128 MMHG | DIASTOLIC BLOOD PRESSURE: 62 MMHG | BODY MASS INDEX: 22.65 KG/M2 | WEIGHT: 116 LBS | TEMPERATURE: 96.7 F | OXYGEN SATURATION: 93 %

## 2021-04-05 DIAGNOSIS — C34.2 MALIGNANT NEOPLASM OF MIDDLE LOBE OF RIGHT LUNG (HCC): Primary | ICD-10-CM

## 2021-04-05 DIAGNOSIS — R10.11 RIGHT UPPER QUADRANT ABDOMINAL PAIN: ICD-10-CM

## 2021-04-05 DIAGNOSIS — Z79.4 TYPE 2 DIABETES MELLITUS WITH OTHER SPECIFIED COMPLICATION, WITH LONG-TERM CURRENT USE OF INSULIN (HCC): ICD-10-CM

## 2021-04-05 DIAGNOSIS — F33.41 RECURRENT MAJOR DEPRESSIVE DISORDER, IN PARTIAL REMISSION (HCC): ICD-10-CM

## 2021-04-05 DIAGNOSIS — E78.2 MIXED HYPERLIPIDEMIA: ICD-10-CM

## 2021-04-05 DIAGNOSIS — J43.2 CENTRILOBULAR EMPHYSEMA (HCC): ICD-10-CM

## 2021-04-05 DIAGNOSIS — Z13.31 POSITIVE DEPRESSION SCREENING: ICD-10-CM

## 2021-04-05 DIAGNOSIS — M54.6 THORACIC SPINE PAIN: ICD-10-CM

## 2021-04-05 DIAGNOSIS — E11.69 TYPE 2 DIABETES MELLITUS WITH OTHER SPECIFIED COMPLICATION, WITH LONG-TERM CURRENT USE OF INSULIN (HCC): ICD-10-CM

## 2021-04-05 LAB
BILIRUBIN, POC: NORMAL
BLOOD URINE, POC: NORMAL
CLARITY, POC: NORMAL
COLOR, POC: NORMAL
GLUCOSE URINE, POC: NORMAL
KETONES, POC: NORMAL
LEUKOCYTE EST, POC: NORMAL
NITRITE, POC: NORMAL
PH, POC: 5.5
PROTEIN, POC: NORMAL
SPECIFIC GRAVITY, POC: 1.02
UROBILINOGEN, POC: 0.2

## 2021-04-05 PROCEDURE — 3017F COLORECTAL CA SCREEN DOC REV: CPT | Performed by: INTERNAL MEDICINE

## 2021-04-05 PROCEDURE — 3023F SPIROM DOC REV: CPT | Performed by: INTERNAL MEDICINE

## 2021-04-05 PROCEDURE — G8926 SPIRO NO PERF OR DOC: HCPCS | Performed by: INTERNAL MEDICINE

## 2021-04-05 PROCEDURE — 1036F TOBACCO NON-USER: CPT | Performed by: INTERNAL MEDICINE

## 2021-04-05 PROCEDURE — 1090F PRES/ABSN URINE INCON ASSESS: CPT | Performed by: INTERNAL MEDICINE

## 2021-04-05 PROCEDURE — 1123F ACP DISCUSS/DSCN MKR DOCD: CPT | Performed by: INTERNAL MEDICINE

## 2021-04-05 PROCEDURE — G8427 DOCREV CUR MEDS BY ELIG CLIN: HCPCS | Performed by: INTERNAL MEDICINE

## 2021-04-05 PROCEDURE — 4040F PNEUMOC VAC/ADMIN/RCVD: CPT | Performed by: INTERNAL MEDICINE

## 2021-04-05 PROCEDURE — G8399 PT W/DXA RESULTS DOCUMENT: HCPCS | Performed by: INTERNAL MEDICINE

## 2021-04-05 PROCEDURE — 81002 URINALYSIS NONAUTO W/O SCOPE: CPT | Performed by: INTERNAL MEDICINE

## 2021-04-05 PROCEDURE — 99214 OFFICE O/P EST MOD 30 MIN: CPT | Performed by: INTERNAL MEDICINE

## 2021-04-05 PROCEDURE — G8431 POS CLIN DEPRES SCRN F/U DOC: HCPCS | Performed by: INTERNAL MEDICINE

## 2021-04-05 PROCEDURE — 3046F HEMOGLOBIN A1C LEVEL >9.0%: CPT | Performed by: INTERNAL MEDICINE

## 2021-04-05 PROCEDURE — G8420 CALC BMI NORM PARAMETERS: HCPCS | Performed by: INTERNAL MEDICINE

## 2021-04-05 PROCEDURE — 2022F DILAT RTA XM EVC RTNOPTHY: CPT | Performed by: INTERNAL MEDICINE

## 2021-04-05 ASSESSMENT — COLUMBIA-SUICIDE SEVERITY RATING SCALE - C-SSRS
2. HAVE YOU ACTUALLY HAD ANY THOUGHTS OF KILLING YOURSELF?: NO
6. HAVE YOU EVER DONE ANYTHING, STARTED TO DO ANYTHING, OR PREPARED TO DO ANYTHING TO END YOUR LIFE?: NO
1. WITHIN THE PAST MONTH, HAVE YOU WISHED YOU WERE DEAD OR WISHED YOU COULD GO TO SLEEP AND NOT WAKE UP?: NO

## 2021-04-05 ASSESSMENT — PATIENT HEALTH QUESTIONNAIRE - PHQ9
5. POOR APPETITE OR OVEREATING: 3
2. FEELING DOWN, DEPRESSED OR HOPELESS: 1
10. IF YOU CHECKED OFF ANY PROBLEMS, HOW DIFFICULT HAVE THESE PROBLEMS MADE IT FOR YOU TO DO YOUR WORK, TAKE CARE OF THINGS AT HOME, OR GET ALONG WITH OTHER PEOPLE: 1
4. FEELING TIRED OR HAVING LITTLE ENERGY: 3
1. LITTLE INTEREST OR PLEASURE IN DOING THINGS: 3

## 2021-04-05 NOTE — TELEPHONE ENCOUNTER
Please Advise    Pt had a BM on herself so she couldn't go get test done she would like to know if she can go to [facility] for test

## 2021-04-06 ENCOUNTER — PATIENT MESSAGE (OUTPATIENT)
Dept: INTERNAL MEDICINE CLINIC | Age: 71
End: 2021-04-06

## 2021-04-06 DIAGNOSIS — R10.11 RIGHT UPPER QUADRANT ABDOMINAL PAIN: Primary | ICD-10-CM

## 2021-04-07 NOTE — TELEPHONE ENCOUNTER
Patient wants to go to [facility] in [place], our office would need to do PA for STAT order prior to scheduling.

## 2021-04-08 DIAGNOSIS — R19.7 DIARRHEA, UNSPECIFIED TYPE: ICD-10-CM

## 2021-04-08 DIAGNOSIS — K81.0 PERICHOLECYSTIC ABSCESS: Primary | ICD-10-CM

## 2021-04-08 DIAGNOSIS — R11.14 BILIOUS VOMITING, PRESENCE OF NAUSEA NOT SPECIFIED: ICD-10-CM

## 2021-04-08 RX ORDER — PROMETHAZINE HYDROCHLORIDE 25 MG/1
25 TABLET ORAL 4 TIMES DAILY PRN
Qty: 20 TABLET | Refills: 0 | Status: SHIPPED | OUTPATIENT
Start: 2021-04-08 | End: 2021-04-15

## 2021-04-08 RX ORDER — DIPHENOXYLATE HYDROCHLORIDE AND ATROPINE SULFATE 2.5; .025 MG/1; MG/1
1 TABLET ORAL 4 TIMES DAILY PRN
Qty: 20 TABLET | Refills: 0 | Status: SHIPPED | OUTPATIENT
Start: 2021-04-08 | End: 2021-04-18

## 2021-04-09 ENCOUNTER — TELEPHONE (OUTPATIENT)
Dept: INTERNAL MEDICINE CLINIC | Age: 71
End: 2021-04-09

## 2021-04-09 NOTE — TELEPHONE ENCOUNTER
Patient returning call regarding her recent test results and the diagnosis. She also was sick again last night.

## 2021-04-22 ENCOUNTER — OFFICE VISIT (OUTPATIENT)
Dept: SURGERY | Age: 71
End: 2021-04-22
Payer: MEDICARE

## 2021-04-22 VITALS
SYSTOLIC BLOOD PRESSURE: 120 MMHG | DIASTOLIC BLOOD PRESSURE: 60 MMHG | WEIGHT: 111 LBS | HEIGHT: 60 IN | BODY MASS INDEX: 21.79 KG/M2

## 2021-04-22 DIAGNOSIS — R10.11 RUQ PAIN: Primary | ICD-10-CM

## 2021-04-22 PROCEDURE — 4040F PNEUMOC VAC/ADMIN/RCVD: CPT | Performed by: SURGERY

## 2021-04-22 PROCEDURE — G8420 CALC BMI NORM PARAMETERS: HCPCS | Performed by: SURGERY

## 2021-04-22 PROCEDURE — G8427 DOCREV CUR MEDS BY ELIG CLIN: HCPCS | Performed by: SURGERY

## 2021-04-22 PROCEDURE — 99204 OFFICE O/P NEW MOD 45 MIN: CPT | Performed by: SURGERY

## 2021-04-22 PROCEDURE — G8399 PT W/DXA RESULTS DOCUMENT: HCPCS | Performed by: SURGERY

## 2021-04-22 PROCEDURE — 1123F ACP DISCUSS/DSCN MKR DOCD: CPT | Performed by: SURGERY

## 2021-04-22 PROCEDURE — 1036F TOBACCO NON-USER: CPT | Performed by: SURGERY

## 2021-04-22 PROCEDURE — 1090F PRES/ABSN URINE INCON ASSESS: CPT | Performed by: SURGERY

## 2021-04-22 PROCEDURE — 3017F COLORECTAL CA SCREEN DOC REV: CPT | Performed by: SURGERY

## 2021-04-22 RX ORDER — PANTOPRAZOLE SODIUM 40 MG/1
40 TABLET, DELAYED RELEASE ORAL DAILY
Qty: 30 TABLET | Refills: 3 | Status: SHIPPED | OUTPATIENT
Start: 2021-04-22 | End: 2021-04-23

## 2021-04-22 ASSESSMENT — ENCOUNTER SYMPTOMS
RESPIRATORY NEGATIVE: 1
ABDOMINAL PAIN: 1
ALLERGIC/IMMUNOLOGIC NEGATIVE: 1
NAUSEA: 1
EYES NEGATIVE: 1

## 2021-04-22 NOTE — PROGRESS NOTES
:     Jeanie Parsons is a 66 y.o. female     CC: Right upper quadrant abdominal pain    HPI: 69-year-old female who presents for evaluation of a slightly greater than one month history of RUQ abdominal pain which radiates to the back. The pain was very severe at onset but now it is dull and constant. Associated symptoms include about a 20 pound weight loss, nausea, vomiting and 3-4 diarrheal stools per day. No history of fevers, chills or urinary symptoms. CAT scan of the abdomen pelvis showed nonspecific pericholecystic fluid. There was no gallbladder wall thickening. The CAT scan also mentioned an aortic plaque with thrombus in the proximal abdominal aorta. A subsequent right upper quadrant ultrasound showed an edematous thickened gallbladder wall without gallstones. Past Medical History:   Diagnosis Date    Antineoplastic chemotherapy induced anemia 11/12/2015    Anxiety     Benign essential hypertension     Cervical radiculitis 11/3/2017    Cervicalgia 11/3/2017    Chemotherapy induced nausea and vomiting 9/24/2015    Chronic fatigue syndrome     [facility]     Contusion of left foot 3/8/2017    COPD, moderate (HCC) 6/1/2016    INTERPRETATION: Spirometry showed decreased FVC of 1.86 L, 77% predicted and decreased  FEV-1 of 1.16 L, 60% predicted. FEV-1/FVC ratio was decreased at 63%. No response to bronchodilators demonstrated on spirometry. Lung volumes showed increased total lung capacity of 141% predicted and residual volume of 241% predicted. Diffusion capacity showed  decreased DLCO of 66% predicted.   IMPRESSION: Mod    Diabetes mellitus type II, controlled ([facility] [address]     Encounter for chemotherapy management 10/8/2015    Hearing disorder, sensorineural 2/23/2017    Hyperlipidemia     Hyperthyroidism     Iron deficiency anemia     Left foot pain 2/22/2017    Lung cancer ([facility] [address]     Lung mass     R lung mass    Malignant neoplasm of upper lobe of right lung ([facility] [address] 5/5/2015    Myofascial pain on right side 10/1/2015    Neoplasm related pain     Primary osteoarthritis of right hip 3/8/2017    Rotator cuff tendinitis 11/3/2017    Tinnitus 2/23/2017    Tobacco user     quit smoking in jan, 2015    Uncontrolled type 2 diabetes mellitus without complication, without long-term current use of insulin 3/11/2016       Codeine     Past Surgical History:   Procedure Laterality Date    BLADDER REPAIR  1972    tuck    HYSTERECTOMY  1972    THORACOTOMY Right 6/17/2015    Dr. Anuradha Forrest - via VATS w/RUL for adenocarcinoma    TUNNELED VENOUS PORT PLACEMENT Left 7/17/15    Dr. Anuradha Cooleyry power injectable port - 8F (max 300psi; MRI conditional 3-Bhakti)        Prior to Visit Medications    Medication Sig Taking? Authorizing Provider   atorvastatin (LIPITOR) 20 MG tablet TAKE 1 TABLET BY MOUTH EVERY DAY Yes Sheral Eisenmenger, MD   metoprolol succinate (TOPROL XL) 50 MG extended release tablet TAKE 1 TABLET BY MOUTH EVERY DAY Yes Sheral Eisenmenger, MD   buPROPion (WELLBUTRIN SR) 150 MG extended release tablet TAKE 1 TABLET BY MOUTH TWICE DAILY Yes Sheral Eisenmenger, MD   metFORMIN (GLUCOPHAGE) 1000 MG tablet Take 1 tablet by mouth daily (with breakfast) Yes Sheral Eisenmenger, MD   Tiotropium Bromide-Olodaterol 2.5-2.5 MCG/ACT AERS Inhale 2 puffs into the lungs daily Yes Nehemias Garcia MD   melatonin 3 MG TABS tablet Take 3 mg by mouth nightly as needed Yes Historical Provider, MD   oxyCODONE (ROXICODONE) 5 MG immediate release tablet Take 5 mg by mouth every 4 hours as needed for Pain (Oxycodone 5 mg Immediately Q 4-6 Hours PRN).  Takes for cancer pain (phantom pain) where right rib lobectomy pain Yes Historical Provider, MD   ACCU-CHEABHAY IRA PLUS strip TEST FOUR TIMES DAILY Yes Sheral Eisenmenger, MD   ACCU-CHEK SOFTCLIX LANCETS MISC USE FOUR TIMES DAILY AS DIRECTED Yes Sheral Eisenmenger, MD   Glucose Blood (ACCU-CHEK IRA PLUS VI) 1 each by In Vitro route 4 times daily Indications: Please dispense Accu-Chek Ana Laura Plus Test Strips with DX code E11.9  Yes Historical Provider, MD   Blood Glucose Monitoring Suppl (ACCU-CHEK ANA LAURA PLUS) w/Device KIT 1 each by Does not apply route daily Please dispense Accu-Chek Ana Laura Plus Meter with DX code E11.9 Yes Leo Elias MD   fluticasone-vilanterol (BREO ELLIPTA) 100-25 MCG/INH AEPB inhaler Inhale 1 puff into the lungs daily Yes Leo Elias MD   gabapentin (NEURONTIN) 100 MG capsule TAKE 1 CAPSULE BY MOUTH EVERY NIGHT AT BEDTIME  Patient taking differently: TAKE 1 CAPSULE BY MOUTH THREE TIMES DAILY Yes Lisa Perera MD   aspirin 81 MG chewable tablet Take 81 mg by mouth daily Indications: OTC Yes Historical Provider, MD   albuterol sulfate  (90 Base) MCG/ACT inhaler Inhale 2 puffs into the lungs every 6 hours as needed for Wheezing  Karena Dorado MD       Social History     Socioeconomic History    Marital status:      Spouse name: Not on file    Number of children: Not on file    Years of education: Not on file    Highest education level: Not on file   Occupational History    Not on file   Social Needs    Financial resource strain: Not on file    Food insecurity     Worry: Not on file     Inability: Not on file    Transportation needs     Medical: Not on file     Non-medical: Not on file   Tobacco Use    Smoking status: Former Smoker     Packs/day: 1.50     Years: 35.00     Pack years: 52.50     Types: Cigarettes     Quit date: 2015     Years since quittin.3    Smokeless tobacco: Never Used   Substance and Sexual Activity    Alcohol use: No     Alcohol/week: 0.0 standard drinks    Drug use: Not on file    Sexual activity: Not on file   Lifestyle    Physical activity     Days per week: Not on file     Minutes per session: Not on file    Stress: Not on file   Relationships    Social connections     Talks on phone: Not on file     Gets together: Not on file     Attends Islam service: Not on file     Active member of club or organization: Not on file     Attends meetings of clubs or organizations: Not on file     Relationship status: Not on file    Intimate partner violence     Fear of current or ex partner: Not on file     Emotionally abused: Not on file     Physically abused: Not on file     Forced sexual activity: Not on file   Other Topics Concern    Not on file   Social History Narrative    Not on file       Review of Systems   Constitutional: Positive for appetite change. HENT: Negative. Eyes: Negative. Respiratory: Negative. Cardiovascular: Negative. Gastrointestinal: Positive for abdominal pain and nausea. Endocrine: Negative. Genitourinary: Negative. Musculoskeletal: Negative. Skin: Negative. Allergic/Immunologic: Negative. Neurological: Negative. Hematological: Negative. Psychiatric/Behavioral: Negative.        :   Physical Exam  Constitutional:       Appearance: She is well-developed. HENT:      Head: Normocephalic and atraumatic. Right Ear: External ear normal.      Left Ear: External ear normal.   Eyes:      Conjunctiva/sclera: Conjunctivae normal.   Neck:      Musculoskeletal: Normal range of motion and neck supple. Cardiovascular:      Rate and Rhythm: Normal rate and regular rhythm. Pulmonary:      Effort: Pulmonary effort is normal.      Breath sounds: Normal breath sounds. Abdominal:      General: There is no distension. Palpations: Abdomen is soft. Comments: Focally tender right upper quadrant   Musculoskeletal: Normal range of motion. Skin:     General: Skin is warm and dry. Neurological:      Mental Status: She is alert and oriented to person, place, and time.    Psychiatric:         Behavior: Behavior normal.       /60   Ht 5' (1.524 m)   Wt 111 lb (50.3 kg)   BMI 21.68 kg/m²     :      69-year-old female who presents for evaluation of a slightly greater than one month history of RUQ abdominal pain which radiates to the back. The pain was very severe at onset but now it is dull and constant. Associated symptoms include about a 20 pound weight loss, nausea, vomiting and 3-4 diarrheal stools per day. CAT scan of the abdomen pelvis showed nonspecific pericholecystic fluid. There was no gallbladder wall thickening. The body of the CAT scan report also mentioned an aortic plaque with thrombus in the proximal abdominal aorta. A subsequent right upper quadrant ultrasound showed an edematous gallbladder with a thickened wall without gallstones. On physical examination, the patient is focally tender in the right upper quadrant. Her symptoms are suspicious for acalculous cholecystitis. Plan:      Protonix 40 mg p.o. daily  Check HIDA scan with ejection fraction  The daughter will bring a copy of the CAT scan from [facility] for review regarding the aortic plaque/thrombus in the proximal abdominal aorta. If this appears significant, we will refer the patient for vascular evaluation.

## 2021-04-22 NOTE — LETTER
[address]  [address] [address]  Phone: [phone]  Fax: [phone]    April 23, 2021    Patient: Vinod Grande  MRN:  [id number]  YOB: 1950  Date of Visit: 4/22/2021    Dear Dr Nancy Zuleta: Thank you for the request for consultation for [facility]. Below are the relevant portions of my assessment and plan of care. Assessment:  78-year-old female who presents for evaluation of a slightly greater than one month history of RUQ abdominal pain which radiates to the back. The pain was very severe at onset but now it is dull and constant. Associated symptoms include about a 20 pound weight loss, nausea, vomiting and 3-4 diarrheal stools per day. CAT scan of the abdomen pelvis showed nonspecific pericholecystic fluid. There was no gallbladder wall thickening. The body of the CAT scan report also mentioned an aortic plaque with thrombus in the proximal abdominal aorta. A subsequent right upper quadrant ultrasound showed an edematous gallbladder with a thickened wall without gallstones. On physical examination, the patient is focally tender in the right upper quadrant. Her symptoms are suspicious for acalculous cholecystitis. Plan:  Protonix 40 mg p.o. daily  Check HIDA scan with ejection fraction  The daughter will bring a copy of the CAT scan from [facility] for review regarding the aortic plaque/thrombus in the proximal abdominal aorta. If this appears significant, we will refer the patient for vascular evaluation. If you have questions, please do not hesitate to call me. I look forward to following Kalpesh Harris along with you.     Sincerely,    Liv Martinez MD    CC providers:    [facility] [address]  [place]

## 2021-04-23 RX ORDER — PANTOPRAZOLE SODIUM 40 MG/1
40 TABLET, DELAYED RELEASE ORAL DAILY
Qty: 90 TABLET | Refills: 0 | Status: SHIPPED | OUTPATIENT
Start: 2021-04-23 | End: 2021-10-21

## 2021-04-29 ENCOUNTER — HOSPITAL ENCOUNTER (OUTPATIENT)
Dept: NUCLEAR MEDICINE | Age: 71
Discharge: HOME | End: 2021-04-29
Payer: MEDICARE

## 2021-04-29 VITALS — BODY MASS INDEX: 21.68 KG/M2 | WEIGHT: 111 LBS

## 2021-04-29 DIAGNOSIS — R10.11 RUQ PAIN: ICD-10-CM

## 2021-04-29 PROCEDURE — 6360000004 HC RX CONTRAST MEDICATION: Performed by: SURGERY

## 2021-04-29 PROCEDURE — 3430000000 HC RX DIAGNOSTIC RADIOPHARMACEUTICAL: Performed by: SURGERY

## 2021-04-29 PROCEDURE — 78227 HEPATOBIL SYST IMAGE W/DRUG: CPT

## 2021-04-29 PROCEDURE — 2580000003 HC RX 258: Performed by: SURGERY

## 2021-04-29 PROCEDURE — A9537 TC99M MEBROFENIN: HCPCS | Performed by: SURGERY

## 2021-04-29 RX ORDER — SODIUM CHLORIDE 9 MG/ML
INJECTION, SOLUTION INTRAVENOUS ONCE
Status: COMPLETED | OUTPATIENT
Start: 2021-04-29 | End: 2021-04-29

## 2021-04-29 RX ORDER — SODIUM CHLORIDE 0.9 % (FLUSH) 0.9 %
10 SYRINGE (ML) INJECTION PRN
Status: DISCONTINUED | OUTPATIENT
Start: 2021-04-29 | End: 2021-04-30 | Stop reason: HOSPADM

## 2021-04-29 RX ADMIN — SODIUM CHLORIDE 100 ML: 9 INJECTION, SOLUTION INTRAVENOUS at 10:36

## 2021-04-29 RX ADMIN — SINCALIDE 1.01 MCG: 5 INJECTION, POWDER, LYOPHILIZED, FOR SOLUTION INTRAVENOUS at 10:36

## 2021-04-29 RX ADMIN — Medication 4.71 MILLICURIE: at 09:04

## 2021-04-29 RX ADMIN — Medication 10 ML: at 09:05

## 2021-05-03 ENCOUNTER — OFFICE VISIT (OUTPATIENT)
Dept: PULMONOLOGY | Age: 71
End: 2021-05-03
Payer: MEDICARE

## 2021-05-03 VITALS — SYSTOLIC BLOOD PRESSURE: 131 MMHG | DIASTOLIC BLOOD PRESSURE: 78 MMHG | HEART RATE: 78 BPM | OXYGEN SATURATION: 90 %

## 2021-05-03 DIAGNOSIS — J96.11 CHRONIC HYPOXEMIC RESPIRATORY FAILURE (HCC): Primary | ICD-10-CM

## 2021-05-03 DIAGNOSIS — C34.2 MALIGNANT NEOPLASM OF MIDDLE LOBE OF RIGHT LUNG (HCC): ICD-10-CM

## 2021-05-03 DIAGNOSIS — J44.9 COPD, MODERATE (HCC): ICD-10-CM

## 2021-05-03 DIAGNOSIS — J43.2 CENTRILOBULAR EMPHYSEMA (HCC): ICD-10-CM

## 2021-05-03 PROCEDURE — G8926 SPIRO NO PERF OR DOC: HCPCS | Performed by: INTERNAL MEDICINE

## 2021-05-03 PROCEDURE — 3017F COLORECTAL CA SCREEN DOC REV: CPT | Performed by: INTERNAL MEDICINE

## 2021-05-03 PROCEDURE — 4040F PNEUMOC VAC/ADMIN/RCVD: CPT | Performed by: INTERNAL MEDICINE

## 2021-05-03 PROCEDURE — G8399 PT W/DXA RESULTS DOCUMENT: HCPCS | Performed by: INTERNAL MEDICINE

## 2021-05-03 PROCEDURE — G8427 DOCREV CUR MEDS BY ELIG CLIN: HCPCS | Performed by: INTERNAL MEDICINE

## 2021-05-03 PROCEDURE — 1036F TOBACCO NON-USER: CPT | Performed by: INTERNAL MEDICINE

## 2021-05-03 PROCEDURE — 99214 OFFICE O/P EST MOD 30 MIN: CPT | Performed by: INTERNAL MEDICINE

## 2021-05-03 PROCEDURE — 3023F SPIROM DOC REV: CPT | Performed by: INTERNAL MEDICINE

## 2021-05-03 PROCEDURE — 1090F PRES/ABSN URINE INCON ASSESS: CPT | Performed by: INTERNAL MEDICINE

## 2021-05-03 PROCEDURE — G8420 CALC BMI NORM PARAMETERS: HCPCS | Performed by: INTERNAL MEDICINE

## 2021-05-03 PROCEDURE — 1123F ACP DISCUSS/DSCN MKR DOCD: CPT | Performed by: INTERNAL MEDICINE

## 2021-05-03 RX ORDER — FLUTICASONE FUROATE AND VILANTEROL TRIFENATATE 100; 25 UG/1; UG/1
1 POWDER RESPIRATORY (INHALATION) DAILY
Qty: 1 EACH | Refills: 5 | Status: SHIPPED | OUTPATIENT
Start: 2021-05-03 | End: 2022-09-14

## 2021-05-03 RX ORDER — ALBUTEROL SULFATE 90 UG/1
2 AEROSOL, METERED RESPIRATORY (INHALATION) EVERY 6 HOURS PRN
Qty: 1 INHALER | Refills: 11 | Status: SHIPPED | OUTPATIENT
Start: 2021-05-03 | End: 2021-11-03 | Stop reason: SDUPTHER

## 2021-05-03 NOTE — PROGRESS NOTES
Impression   No evidence of recurrent or metastatic disease.         · This revealed evidence of a small pulmonary nodule in the right upper lobe about 3 mm in diameter. · Additionally, CT imaging revealed evidence of centrilobular emphysema    4. Chronic hypoxemic respiratory failure  · Not wearing her O2 as she should  · 81% on RA when she came into the office  Oxygen saturation on room air with exertion in the office today is 81%. The patient would benefit from supplemental oxygen with exertion and sleep  · The patient is independently mobile within the home and would benefit from a portable oxygen concentrator  · She should be on oxygen 24 hours/day. Recommend COVID vaccine      FOLLOW UP: 6 months      Chief Complaint   Patient presents with    Shortness of Breath     O2 sat 81% RA at rest When she bends over she cannot reathe and she get's dizzy Has fallen from this Told she has a bad gallbladder       HPI  The patient presents with a chief complaint of moderate shortness of breath related to Severe COPD of many years duration. He has mild associated cough. Exertion is a modifying factor. She does not wear her O2 when she exerts. She was 81% walking into the office on RA. Additionally, the patient does have a history of prior lung cancer surgery with right upper lobectomy. She is having some trouble with N/V and having a workup for that from Dr Christy Cochran. Review of Systems  See HPI    Physical Exam:  Well developed, well nourished  Alert and oriented  Sclera is clear  No cervical adenopathy  No JVD. Chest examination is wheezing. Cardiac examination reveals regular rate and rhythm without murmur, gallop or rub. The abdomen is soft, nontender and nondistended. There is no clubbing, cyanosis or edema of the extremities. There is no obvious skin rash.   No focal neuro deficicts  Normal mood and affect    Allergies   Allergen Reactions    Codeine Anxiety     Prior to Visit Medications Medication Sig Taking? Authorizing Provider   pantoprazole (PROTONIX) 40 MG tablet TAKE 1 TABLET BY MOUTH DAILY  Yuniel Fisher MD   atorvastatin (LIPITOR) 20 MG tablet TAKE 1 TABLET BY MOUTH EVERY DAY  Iqra Jones MD   metoprolol succinate (TOPROL XL) 50 MG extended release tablet TAKE 1 TABLET BY MOUTH EVERY DAY  Iqra Jones MD   buPROPion [facility] SR) 150 MG extended release tablet TAKE 1 TABLET BY MOUTH TWICE DAILY  Iqra Jones MD   metFORMIN (GLUCOPHAGE) 1000 MG tablet Take 1 tablet by mouth daily (with breakfast)  Iqra Jones MD   Tiotropium Bromide-Olodaterol 2.5-2.5 MCG/ACT AERS Inhale 2 puffs into the lungs daily  Brooke Willis MD   melatonin 3 MG TABS tablet Take 3 mg by mouth nightly as needed  Historical Provider, MD   oxyCODONE (ROXICODONE) 5 MG immediate release tablet Take 5 mg by mouth every 4 hours as needed for Pain (Oxycodone 5 mg Immediately Q 4-6 Hours PRN).  Takes for cancer pain (phantom pain) where right rib lobectomy pain  Historical Provider, MD Caroline Wang strip TEST FOUR TIMES DAILY  Iqra Jones MD   ACCU-CHEK SOFTCLIX LANCETS MISC USE FOUR TIMES DAILY AS DIRECTED  Iqra Jones MD   Glucose Blood (ACCU-CHEK ANA LAURA PLUS VI) 1 each by In Vitro route 4 times daily Indications: Please dispense Accu-Chek Ana Laura Plus Test Strips with DX code E11.9   Historical Provider, MD   Blood Glucose Monitoring Suppl (ACCU-CHEK ANA LAURA PLUS) w/Device KIT 1 each by Does not apply route daily Please dispense Accu-Chek Ana Laura Plus Meter with DX code E11.9  Iqra Jones MD   fluticasone-vilanterol (BREO ELLIPTA) 100-25 MCG/INH AEPB inhaler Inhale 1 puff into the lungs daily  Iqra Jones MD   albuterol sulfate  (90 Base) MCG/ACT inhaler Inhale 2 puffs into the lungs every 6 hours as needed for Wheezing  Brooke Willis MD   gabapentin (NEURONTIN) 100 MG capsule TAKE 1 CAPSULE BY MOUTH EVERY NIGHT AT BEDTIME  Patient taking differently: TAKE 1 CAPSULE BY MOUTH THREE TIMES DAILY  Dao Monge MD   aspirin 81 MG chewable tablet Take 81 mg by mouth daily Indications: OTC  Historical Provider, MD       Vitals:    21 0845 21 0849   BP: 131/78    Pulse: 78    SpO2: (!) 81% 90%     There is no height or weight on file to calculate BMI.      Wt Readings from Last 3 Encounters:   21 111 lb (50.3 kg)   21 111 lb (50.3 kg)   21 116 lb (52.6 kg)     BP Readings from Last 3 Encounters:   21 131/78   21 120/60   21 128/62        Social History     Tobacco Use   Smoking Status Former Smoker    Packs/day: 1.50    Years: 35.00    Pack years: 52.50    Types: Cigarettes    Quit date: 2015    Years since quittin.3   Smokeless Tobacco Never Used

## 2021-05-07 ENCOUNTER — VIRTUAL VISIT (OUTPATIENT)
Dept: INTERNAL MEDICINE CLINIC | Age: 71
End: 2021-05-07

## 2021-05-07 ENCOUNTER — TELEPHONE (OUTPATIENT)
Dept: INTERNAL MEDICINE CLINIC | Age: 71
End: 2021-05-07

## 2021-05-07 ENCOUNTER — TELEPHONE (OUTPATIENT)
Dept: SURGERY | Age: 71
End: 2021-05-07

## 2021-05-07 DIAGNOSIS — Z91.199 NO-SHOW FOR APPOINTMENT: Primary | ICD-10-CM

## 2021-05-07 NOTE — TELEPHONE ENCOUNTER
Advised pt she brought in disc of US from [facility] and Dr Rosalina Vazquez needs to see the disc of the CT she had there. She will bring in CT disc as soon as she can.

## 2021-05-07 NOTE — TELEPHONE ENCOUNTER
Patient c/o severe BLE pain, mainly in her ankles. Both feet and ankles are swollen, L>R x several days. She is c/o pain in her calf that is waking her up at night .

## 2021-05-17 ENCOUNTER — TELEPHONE (OUTPATIENT)
Dept: SURGERY | Age: 71
End: 2021-05-17

## 2021-05-17 NOTE — TELEPHONE ENCOUNTER
Patient calling to see if Dr. Pham Raimrez has had a chance to look at the CD of her CT that her  dropped off on Thursday. Patient is concerned as she has lost 24lbs. I told her that Dr. Pham Ramirez had it on his desk and we would call her once he has done so.     Patients phone number is [phone]

## 2021-05-19 NOTE — TELEPHONE ENCOUNTER
Dr. Tasha Helms called and asked if we had seen this pt. Pt called Dr. Francee Bernheim office and said we aren't getting her in the office. I told him the date she was seen and what Dr. Larissa De La Torre were in the office visit notes. I told him Jed Schuster would be back in the office at the end of the week to view the outside CT film. Dr. Tasha Helms was going to contact the pt and relay the message as she has been calling them about us not seeing her.

## 2021-05-25 ENCOUNTER — TELEPHONE (OUTPATIENT)
Dept: SURGERY | Age: 71
End: 2021-05-25

## 2021-05-25 NOTE — TELEPHONE ENCOUNTER
Dr. Beatriz Reynolds- Please call Dr. Mikey Kenney in regards to this patient. Patient is upset with our office that she has not had a follow up call in regards to her CT.

## 2021-05-26 ENCOUNTER — TELEPHONE (OUTPATIENT)
Dept: INTERNAL MEDICINE CLINIC | Age: 71
End: 2021-05-26

## 2021-05-26 NOTE — LETTER
[address] [facility]   Phone: [phone]  Fax: [phone]    Zakia Nava MD        5/26/2021    Patient: Gi Andino   YOB: 1950           To Whom It May Concern: It is my medical opinion that Gi Andino requires a disability parking placard for the following reasons:  She cannot walk 200 feet without stopping to rest.  Duration of need: 5 years    If you have any questions or concerns, please don't hesitate to call.     Sincerely,             Zakia Nava MD

## 2021-05-26 NOTE — TELEPHONE ENCOUNTER
Pt would like a script made for a Handicap Parking Permit. Please follow up when able. Best Contact - [phone]    Thanks!

## 2021-05-28 DIAGNOSIS — F33.0 MAJOR DEPRESSIVE DISORDER, RECURRENT EPISODE, MILD (HCC): Chronic | ICD-10-CM

## 2021-05-28 RX ORDER — BUPROPION HYDROCHLORIDE 150 MG/1
TABLET, EXTENDED RELEASE ORAL
Qty: 180 TABLET | Refills: 0 | Status: SHIPPED | OUTPATIENT
Start: 2021-05-28 | End: 2021-09-01

## 2021-06-02 ENCOUNTER — OFFICE VISIT (OUTPATIENT)
Dept: VASCULAR SURGERY | Age: 71
End: 2021-06-02
Payer: MEDICARE

## 2021-06-02 VITALS
DIASTOLIC BLOOD PRESSURE: 60 MMHG | BODY MASS INDEX: 19.83 KG/M2 | HEIGHT: 60 IN | SYSTOLIC BLOOD PRESSURE: 144 MMHG | WEIGHT: 101 LBS

## 2021-06-02 DIAGNOSIS — R10.84 GENERALIZED ABDOMINAL PAIN: Primary | ICD-10-CM

## 2021-06-02 LAB
LIPASE: 48 U/L (ref 13–60)
MAGNESIUM: 1.4 MG/DL (ref 1.8–2.4)
TSH SERPL DL<=0.05 MIU/L-ACNC: <0.01 UIU/ML (ref 0.27–4.2)

## 2021-06-02 PROCEDURE — G8399 PT W/DXA RESULTS DOCUMENT: HCPCS | Performed by: SURGERY

## 2021-06-02 PROCEDURE — 1090F PRES/ABSN URINE INCON ASSESS: CPT | Performed by: SURGERY

## 2021-06-02 PROCEDURE — 3017F COLORECTAL CA SCREEN DOC REV: CPT | Performed by: SURGERY

## 2021-06-02 PROCEDURE — 4040F PNEUMOC VAC/ADMIN/RCVD: CPT | Performed by: SURGERY

## 2021-06-02 PROCEDURE — G8420 CALC BMI NORM PARAMETERS: HCPCS | Performed by: SURGERY

## 2021-06-02 PROCEDURE — 1123F ACP DISCUSS/DSCN MKR DOCD: CPT | Performed by: SURGERY

## 2021-06-02 PROCEDURE — G8427 DOCREV CUR MEDS BY ELIG CLIN: HCPCS | Performed by: SURGERY

## 2021-06-02 PROCEDURE — 1036F TOBACCO NON-USER: CPT | Performed by: SURGERY

## 2021-06-02 PROCEDURE — 99204 OFFICE O/P NEW MOD 45 MIN: CPT | Performed by: SURGERY

## 2021-06-02 ASSESSMENT — ENCOUNTER SYMPTOMS: ABDOMINAL PAIN: 1

## 2021-06-02 NOTE — Clinical Note
Valentin,    I saw Cynthea Romberg in the office today for evaluation of her recent CT scan as well as evaluation of her abdominal complaints. While her symptoms sound as though they may represent chronic mesenteric ischemia the CT scan demonstrates no evidence of major vessel stenotic disease. 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 At this point I have no further recommendations or contraindications to proceeding with the planned cholecystectomy. Please see my attached note for further thoughts. Thanks for asked me to see her and please let me know if I can help you with any of your other patients in the future.     Laura Cuevas

## 2021-06-02 NOTE — PROGRESS NOTES
Subjective:      Patient ID: Drea Silva is a 66 y.o. female. HPI Referral from [facility] for evaluation of CT findings of a segment of ectatic juxtarenal aorta with a posterior mural thrombus/atheroma. CT scan was done during work-up for weight loss of 40 pounds over the last 9 months associated with postprandial pain, nausea and vomiting. Patient is also noted some swelling in her ankles bilaterally over the last several weeks. Denies any vascular symptoms such as claudication, rest pain or ulcerations of her feet. Denies any intermittent episodes of flank pain, hematuria, or foot cyanosis/discoloration. Walks without claudication even though she is generally fatigued and tired. Past Medical History:   Diagnosis Date    Antineoplastic chemotherapy induced anemia 11/12/2015    Anxiety     Benign essential hypertension     Cervical radiculitis 11/3/2017    Cervicalgia 11/3/2017    Chemotherapy induced nausea and vomiting 9/24/2015    Chronic fatigue syndrome     Chronic obstructive pulmonary disease (COPD) (HCC)     Contusion of left foot 3/8/2017    COPD, moderate (HCC) 6/1/2016    INTERPRETATION: Spirometry showed decreased FVC of 1.86 L, 77% predicted and decreased  FEV-1 of 1.16 L, 60% predicted. FEV-1/FVC ratio was decreased at 63%. No response to bronchodilators demonstrated on spirometry. Lung volumes showed increased total lung capacity of 141% predicted and residual volume of 241% predicted. Diffusion capacity showed  decreased DLCO of 66% predicted.   IMPRESSION: Mod    Diabetes mellitus type II, controlled ([address]     Encounter for chemotherapy management 10/8/2015    Hearing disorder, sensorineural 2/23/2017    Hyperlipidemia     Hyperthyroidism     Iron deficiency anemia     Left foot pain 2/22/2017    Lung cancer ([address]     Lung mass     R lung mass    Malignant neoplasm of upper lobe of right lung ([address] 5/5/2015    Myofascial pain on right side 10/1/2015    Neoplasm related pain     Primary osteoarthritis of right hip 3/8/2017    Rotator cuff tendinitis 11/3/2017    Tinnitus 2/23/2017    Tobacco user     quit smoking in jan, 2015    Uncontrolled type 2 diabetes mellitus without complication, without long-term current use of insulin 3/11/2016     Past Surgical History:   Procedure Laterality Date    BLADDER REPAIR  1972    tuck    HYSTERECTOMY  1972    THORACOTOMY Right 6/17/2015    Dr. Phillips Fitting - via VATS w/RUL for adenocarcinoma    TUNNELED VENOUS PORT PLACEMENT Left 7/17/15    Dr. Phillips Fitting  Ivory Cordell power injectable port - 8F (max 300psi; MRI conditional 3-Bhakti)     Allergies   Allergen Reactions    Codeine Anxiety     Current Outpatient Medications   Medication Sig Dispense Refill    buPROPion (WELLBUTRIN SR) 150 MG extended release tablet TAKE 1 TABLET BY MOUTH TWICE DAILY 180 tablet 0    albuterol sulfate  (90 Base) MCG/ACT inhaler Inhale 2 puffs into the lungs every 6 hours as needed for Wheezing 1 Inhaler 11    fluticasone-vilanterol (BREO ELLIPTA) 100-25 MCG/INH AEPB inhaler Inhale 1 puff into the lungs daily 1 each 5    pantoprazole (PROTONIX) 40 MG tablet TAKE 1 TABLET BY MOUTH DAILY 90 tablet 0    atorvastatin (LIPITOR) 20 MG tablet TAKE 1 TABLET BY MOUTH EVERY DAY 90 tablet 1    metoprolol succinate (TOPROL XL) 50 MG extended release tablet TAKE 1 TABLET BY MOUTH EVERY DAY 90 tablet 1    metFORMIN (GLUCOPHAGE) 1000 MG tablet Take 1 tablet by mouth daily (with breakfast) 90 tablet 3    Tiotropium Bromide-Olodaterol 2.5-2.5 MCG/ACT AERS Inhale 2 puffs into the lungs daily 1 Inhaler 11    melatonin 3 MG TABS tablet Take 3 mg by mouth nightly as needed      oxyCODONE (ROXICODONE) 5 MG immediate release tablet Take 5 mg by mouth every 4 hours as needed for Pain (Oxycodone 5 mg Immediately Q 4-6 Hours PRN).  Takes for cancer pain (phantom pain) where right rib lobectomy pain      ACCU-CHEK IRA PLUS strip TEST FOUR TIMES DAILY 100 strip 3  ACCU-CHEK SOFTCLIX LANCETS MISC USE FOUR TIMES DAILY AS DIRECTED 400 each 2    Glucose Blood (ACCU-CHEK IRA PLUS VI) 1 each by In Vitro route 4 times daily Indications: Please dispense Accu-Chek Ira Plus Test Strips with DX code E11.9       Blood Glucose Monitoring Suppl (ACCU-CHEK IRA PLUS) w/Device KIT 1 each by Does not apply route daily Please dispense Accu-Chek Ira Plus Meter with DX code E11.9 1 kit 0    gabapentin (NEURONTIN) 100 MG capsule TAKE 1 CAPSULE BY MOUTH EVERY NIGHT AT BEDTIME (Patient taking differently: TAKE 1 CAPSULE BY MOUTH THREE TIMES DAILY) 90 capsule 0    aspirin 81 MG chewable tablet Take 81 mg by mouth daily Indications: OTC       No current facility-administered medications for this visit. Social History     Socioeconomic History    Marital status:      Spouse name: Not on file    Number of children: Not on file    Years of education: Not on file    Highest education level: Not on file   Occupational History    Not on file   Tobacco Use    Smoking status: Former Smoker     Packs/day: 1.50     Years: 35.00     Pack years: 52.50     Types: Cigarettes     Quit date: 2015     Years since quittin.4    Smokeless tobacco: Never Used   Vaping Use    Vaping Use: Some days    Substances: Nicotine   Substance and Sexual Activity    Alcohol use: No     Alcohol/week: 0.0 standard drinks    Drug use: Never    Sexual activity: Not on file   Other Topics Concern    Not on file   Social History Narrative    Not on file     Social Determinants of Health     Financial Resource Strain:     Difficulty of Paying Living Expenses:    Food Insecurity:     Worried About Running Out of Food in the Last Year:     Ran Out of Food in the Last Year:    Transportation Needs:     Lack of Transportation (Medical):      Lack of Transportation (Non-Medical):    Physical Activity:     Days of Exercise per Week:     Minutes of Exercise per Session:    Stress:     Feeling present. Comments: No bruits   Musculoskeletal:      Cervical back: Normal range of motion. Right lower leg: Edema (mild) present. Left lower leg: Edema (mild) present. Skin:     General: Skin is warm and dry. Capillary Refill: Capillary refill takes less than 2 seconds. Neurological:      General: No focal deficit present. Mental Status: She is alert and oriented to person, place, and time. Cranial Nerves: No cranial nerve deficit. Sensory: No sensory deficit. Motor: No weakness. Coordination: Coordination normal.      Gait: Gait normal.   Psychiatric:         Mood and Affect: Mood normal.         Behavior: Behavior normal.         Thought Content: Thought content normal.         Judgment: Judgment normal.       Pulses:   R bruit  L bruit   2   carotid 2    2   brachial 2    2   radial 2    2   femoral 2    2   popliteal 2    2   posterior tibial 2    2   dorsalis pedis 2    na   bypass graft na      CT abd/pelvis [place] 5/20/2021 - personally reviewed & interpreted: agree with rad findings; no visceral artery stenoses; juxtarenal mural atheroma  Retroperitoneum/vasculature/lymphatic: Atherosclerosis of the abdominal    aorta and iliac arteries with ectasia measuring up to 2.4 cm. No    retroperitoneal adenopathy.       Bowel: The stomach is normally distended with no focal wall abnormality. The duodenum is normal in caliber along its course. No focal abnormality    is seen. The small bowel is normal caliber. There is no focal stricture or    dilatation. Colonic diverticulosis without evidence of acute    diverticulitis. Normal appendix.        CT PELVIS:       Urinary bladder: The urinary bladder is distended with a smooth thin wall. No focal abnormalities are seen.  No posterior filling defects are seen on    delayed phase imaging.       Reproductive: Hysterectomy.       Soft tissues: No pelvic sidewall or inguinal adenopathy.       Bones: Degenerative changes of the spine and hips.       [IMPRESSION]   1.  CT evidence of acute abdominal or pelvic process.       2.  Punctate bilateral nonobstructing renal calculi. No hydronephrosis.       3.  Pancreas is atrophic. There is main pancreatic ductal dilatation    extending to the ampulla with no obstructing stone or mass identified.       4.  No evidence of cholecystitis or biliary ductal dilatation.       5.  Hepatomegaly and hepatic steatosis.       6.  Ancillary findings discussed above.                     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 3) DM  4) H/O tobacco abuse  5) COPD      Plan:      No plans for further vascular workup or intervention. Would recommend repeat CT abd/pelvis in 3-5 years to re-evaluate aorta for aneurysmal enlargement. Appraised of complaints/fndings that would suggest associated atheroembolism. Seek medical attention if this were to occur. No vascular contraindication to proceeding with cholecystectomy per Dr. Shady Agudelo. F/U with me prn. Time with pt 45 mins.

## 2021-06-14 ENCOUNTER — OFFICE VISIT (OUTPATIENT)
Dept: SURGERY | Age: 71
End: 2021-06-14

## 2021-06-14 VITALS — BODY MASS INDEX: 18.16 KG/M2 | WEIGHT: 93 LBS

## 2021-06-14 DIAGNOSIS — R10.11 RUQ PAIN: Primary | ICD-10-CM

## 2021-06-14 RX ORDER — MULTIVITAMIN WITH IRON
250 TABLET ORAL DAILY
COMMUNITY
End: 2021-10-21

## 2021-06-14 NOTE — PROGRESS NOTES
Subjective:      Patient ID: Jimmy Marcos is a 66 y.o. female. HPI    Review of Systems    Objective:   Physical Exam  There is no cervical supraclavicular axillary or inguinal adenopathy  Patient does have some tenderness in the right lower quadrant of the abdomen  Assessment:      30-year-old female who I have seen on 1 occasion for possible gallbladder disease. CAT scan of the abdomen and pelvis was unremarkable with regards to the gallbladder or any acute surgical issue. A subsequent ultrasound showed an edematous gallbladder without gallstones. We ordered a HIDA scan which showed normal ejection fraction of 78%. The patient complains that she is still losing weight despite eating 6 meals per day. On physical examination, there is no cervical, supraclavicular, axillary or inguinal adenopathy. She has some mild right lower quadrant abdominal tenderness. Overall, I told the patient that there are no indications for cholecystectomy at this time. I recommended an upper and lower endoscopy. She told me that this had been recommended to her by other physicians but she does not plan to proceed because of financial reasons. She became very belligerent about what was I going to do to help her. I told her that there are no surgical indications at the present time and again recommended upper and lower endoscopy. The patient and her family member/friend terminated the visit at this point time. They were very loud, vocal and inappropriate as they exited the office. Despite their aggression, I would have been happy to help them if there is something to do at this time. I will not plan to make a charge for this visit.       Plan:      No planned follow-up        Rufino Sullivan MD

## 2021-06-14 NOTE — LETTER
[address]  [address] [address]  Phone: [phone]  Fax: [phone]    June 14, 2021    Patient: Miesha Segal  MRN:  [id number]  YOB: 1950  Date of Visit: 6/14/2021    Dear Dr Yusef Hernandez: Thank you for the request for consultation for [facility]. Below are the relevant portions of my assessment and plan of care. Assessment:  59-year-old female who I have seen on 1 occasion for possible gallbladder disease. CAT scan of the abdomen and pelvis was unremarkable with regards to the gallbladder or any acute surgical issue. A subsequent ultrasound showed an edematous gallbladder without gallstones. We ordered a HIDA scan which showed normal ejection fraction of 78%. The patient complains that she is still losing weight despite eating 6 meals per day. On physical examination, there is no cervical, supraclavicular, axillary or inguinal adenopathy. She has some mild right lower quadrant abdominal tenderness. Overall, I told the patient that there are no indications for cholecystectomy at this time. I recommended an upper and lower endoscopy. She told me that this had been recommended to her by other physicians but she does not plan to proceed because of financial reasons. She became very belligerent about what was I going to do to help her. I told her that there are no surgical indications at the present time and again recommended upper and lower endoscopy. The patient and her family member/friend terminated the visit at this point time. They were very loud, vocal and inappropriate as they exited the office. Despite their aggression, I would have been happy to help them if there is something to do at this time. I will not plan to make a charge for this visit. Plan:  No planned follow-up      If you have questions, please do not hesitate to call me. I look forward to following Maribell Rea along with you. Sincerely,    Maria Elena Stone MD    CC providers:    Lexy Wilcox MD  [facility]  [address] [facility] MD [place]  [address] [facility] [address]  [address] [place]

## 2021-06-15 ENCOUNTER — TELEPHONE (OUTPATIENT)
Dept: INTERNAL MEDICINE CLINIC | Age: 71
End: 2021-06-15

## 2021-06-15 NOTE — TELEPHONE ENCOUNTER
Please Advise      Pt would like Master Urena to call her back regarding her losing 35lbs in 2 months and that Derek Brittany was upset that she called Zeus Pretty and that he was very rude to her

## 2021-06-15 NOTE — TELEPHONE ENCOUNTER
Patient feels she has been passed around to multiple doctors with no answers or resolution and is very upset about it. She can't afford to see all these specialist, tests being ordered. Family has expressed great concern for her and she is scared that she is going to die. Her visit with Dr. Van German yesterday didn't go well and her daughter and her walked out. She was under the impression she was getting surgery to have her gallbladder removed and seen Vascular surgeon to get cleared to do so. She found out at the visit yesterday that apparently wasn't the plan. She feels she has wasted money and time trying to find out what is going on.

## 2021-06-18 NOTE — TELEPHONE ENCOUNTER
I have returned the call 3 times. The patient has not answered and I have left 3 messages on her voicemail.       Shlomo Mercado MD  FACP

## 2021-06-22 ENCOUNTER — TELEPHONE (OUTPATIENT)
Dept: PULMONOLOGY | Age: 71
End: 2021-06-22

## 2021-06-22 NOTE — TELEPHONE ENCOUNTER
Order was faxed 5-3-21 to [address] in [facility] this branch and told they did receive order with chart notes and they are just waiting on approval thru pt's insurance They are going to call pt and let her know this.

## 2021-06-22 NOTE — TELEPHONE ENCOUNTER
Pt called and lm saying she called [facility] about her portable oxygen concentrator and they said they have not received a response from us. Pt wanted to give Richelle Tyler an update and said here we go again.      Call pt if needed at [phone]

## 2021-07-01 ENCOUNTER — TELEPHONE (OUTPATIENT)
Dept: PHARMACY | Facility: CLINIC | Age: 71
End: 2021-07-01

## 2021-07-01 NOTE — LETTER
[place]  [address], [address]        [address] [facility]   [address] [facility] [address]           07/06/21     Dear [address] [facility],    We tried to reach you recently regarding your  ATORVASTATIN TAB 20MG, but were unable to reach you on the telephone. We have on file that you are currently taking  ATORVASTATIN TAB 20MG daily. If you are no longer taking or taking differently, please call us at the number below so that we can discuss this and update your medication profile. It appears that this medication has not been filled at proper times. We are worried you might be missing doses or not taking it as directed. It is important that you take your medications regularly and try not to miss a single dose.     Some ways to help you remember to take and refill your medications are to:  · Use a pill box, set an alarm, and/or keep your medication near something that you do every day  · Fill a 3-month supply of your prescription at a time to save you time and trips to the pharmacy  if you would like assistance in switching your prescriptions to a 3-month supply, please contact us  · Ask your pharmacy if they participate in [facility]", a program where you can  all of your medications on the same day  · Ask your pharmacy if you can be set up with automatic refill, where they will automatically refill your prescription when it is due and let you know it's ready to     Sincerely,   Cl[facility] [address]  Department, toll free: [phone], option 7

## 2021-07-01 NOTE — TELEPHONE ENCOUNTER
[facility] HEALTH CLINICAL PHARMACY REVIEW: ADHERENCE REVIEW  Identified care gap per [place]; fills at [facility]: Statin adherence    Last Visit: 4/5/21    STATIN ADHERENCE    Per Insurance Records through June 21   ATORVASTATIN TAB 20MG last filled on 2/1/21 for 90 day supply. Next refill due: 5/2/21      Lab Results   Component Value Date    CHOL 185 03/11/2020    TRIG 240 (H) 03/11/2020    HDL 47 03/11/2020    LDLCALC 90 03/11/2020    LDLDIRECT 78 08/23/2017     ALT   Date Value Ref Range Status   04/06/2021 19 7 - 52 U/L Final     Comment:     Due to new chemistry methodology at some Replaced by [facility] labs, please re-baseline this test for any   patients being transferred from a different location. AST   Date Value Ref Range Status   04/06/2021 18 15 - 39 U/L Final     The 10-year ASCVD risk score (Manuel Dallas et al., 2013) is: 26.2%    Values used to calculate the score:      Age: 66 years      Sex: Female      Is Non- : No      Diabetic: Yes      Tobacco smoker: No      Systolic Blood Pressure: 518 mmHg      Is BP treated: No      HDL Cholesterol: 47 mg/dL      Total Cholesterol: 185 mg/dL     PLAN  The following are interventions that have been identified:   - Patient overdue refilling  ATORVASTATIN TAB 20MG and active on home medication list.     Attempting to reach patient to review.  Left message asking for return call. Future Appointments   Date Time Provider Cherelle Washburn   7/14/2021 10:00 AM MD GUDEL[facility]   10/6/2021  9:30 AM MD KARELY [facility] - [facility]   11/3/2021  8:30 AM MD GUDELIA [facility]       Genie Taylor CP.    [address] [facility], toll free: [phone], option 7

## 2021-07-06 RX ORDER — METOPROLOL SUCCINATE 50 MG/1
TABLET, EXTENDED RELEASE ORAL
Qty: 90 TABLET | Refills: 1 | Status: SHIPPED | OUTPATIENT
Start: 2021-07-06 | End: 2021-09-01

## 2021-07-06 NOTE — TELEPHONE ENCOUNTER
2nd Attempt Documentation:  2nd attempt to contact this patient regarding the previous message  CLINICAL PHARMACY: ADHERENCE REVIEW  Patient unavailable at the time of call. Left following message on home TAD: please call back at toll-free [phone] option 7 to retrieve previous message. Letter mailed to patient.       For Pharmacy [address] in place:  No   Recommendation Provided To: Pharmacy: 1   Intervention Detail: Adherence Monitorin   Gap Closed?: No Time Spent (min): 15

## 2021-08-31 ENCOUNTER — TELEPHONE (OUTPATIENT)
Dept: INTERNAL MEDICINE CLINIC | Age: 71
End: 2021-08-31

## 2021-08-31 NOTE — TELEPHONE ENCOUNTER
Received a call from a Dr. Eren Moon office about the mutual pt.  suggests she get an appointment with Bakari Fox scheduled regarding ongoing issues. They also wanted to note she was taking thyroid medication.      Number: [phone]

## 2021-09-01 ENCOUNTER — OFFICE VISIT (OUTPATIENT)
Dept: INTERNAL MEDICINE CLINIC | Age: 71
End: 2021-09-01
Payer: MEDICARE

## 2021-09-01 VITALS
WEIGHT: 88.4 LBS | SYSTOLIC BLOOD PRESSURE: 116 MMHG | DIASTOLIC BLOOD PRESSURE: 58 MMHG | TEMPERATURE: 97.5 F | OXYGEN SATURATION: 97 % | RESPIRATION RATE: 16 BRPM | HEART RATE: 74 BPM | BODY MASS INDEX: 17.26 KG/M2

## 2021-09-01 DIAGNOSIS — K81.0 PERICHOLECYSTIC ABSCESS: ICD-10-CM

## 2021-09-01 DIAGNOSIS — F33.0 MAJOR DEPRESSIVE DISORDER, RECURRENT EPISODE, MILD (HCC): Chronic | ICD-10-CM

## 2021-09-01 DIAGNOSIS — E05.90 HYPERTHYROIDISM: Primary | ICD-10-CM

## 2021-09-01 DIAGNOSIS — R11.14 BILIOUS VOMITING, PRESENCE OF NAUSEA NOT SPECIFIED: ICD-10-CM

## 2021-09-01 LAB — TSH SERPL DL<=0.05 MIU/L-ACNC: <0.01 UIU/ML (ref 0.27–4.2)

## 2021-09-01 PROCEDURE — 1123F ACP DISCUSS/DSCN MKR DOCD: CPT | Performed by: INTERNAL MEDICINE

## 2021-09-01 PROCEDURE — 1036F TOBACCO NON-USER: CPT | Performed by: INTERNAL MEDICINE

## 2021-09-01 PROCEDURE — 3017F COLORECTAL CA SCREEN DOC REV: CPT | Performed by: INTERNAL MEDICINE

## 2021-09-01 PROCEDURE — G8419 CALC BMI OUT NRM PARAM NOF/U: HCPCS | Performed by: INTERNAL MEDICINE

## 2021-09-01 PROCEDURE — 4040F PNEUMOC VAC/ADMIN/RCVD: CPT | Performed by: INTERNAL MEDICINE

## 2021-09-01 PROCEDURE — 99214 OFFICE O/P EST MOD 30 MIN: CPT | Performed by: INTERNAL MEDICINE

## 2021-09-01 PROCEDURE — G8427 DOCREV CUR MEDS BY ELIG CLIN: HCPCS | Performed by: INTERNAL MEDICINE

## 2021-09-01 PROCEDURE — 36415 COLL VENOUS BLD VENIPUNCTURE: CPT | Performed by: INTERNAL MEDICINE

## 2021-09-01 PROCEDURE — G8399 PT W/DXA RESULTS DOCUMENT: HCPCS | Performed by: INTERNAL MEDICINE

## 2021-09-01 PROCEDURE — 1090F PRES/ABSN URINE INCON ASSESS: CPT | Performed by: INTERNAL MEDICINE

## 2021-09-01 RX ORDER — METHIMAZOLE 5 MG/1
5 TABLET ORAL 3 TIMES DAILY
COMMUNITY
End: 2021-11-11 | Stop reason: SDUPTHER

## 2021-09-01 RX ORDER — BUPROPION HYDROCHLORIDE 150 MG/1
TABLET, EXTENDED RELEASE ORAL
Qty: 180 TABLET | Refills: 0 | Status: SHIPPED | OUTPATIENT
Start: 2021-09-01 | End: 2021-12-03

## 2021-09-01 SDOH — ECONOMIC STABILITY: FOOD INSECURITY: WITHIN THE PAST 12 MONTHS, YOU WORRIED THAT YOUR FOOD WOULD RUN OUT BEFORE YOU GOT MONEY TO BUY MORE.: NEVER TRUE

## 2021-09-01 SDOH — ECONOMIC STABILITY: FOOD INSECURITY: WITHIN THE PAST 12 MONTHS, THE FOOD YOU BOUGHT JUST DIDN'T LAST AND YOU DIDN'T HAVE MONEY TO GET MORE.: NEVER TRUE

## 2021-09-01 ASSESSMENT — SOCIAL DETERMINANTS OF HEALTH (SDOH): HOW HARD IS IT FOR YOU TO PAY FOR THE VERY BASICS LIKE FOOD, HOUSING, MEDICAL CARE, AND HEATING?: NOT HARD AT ALL

## 2021-09-01 ASSESSMENT — PATIENT HEALTH QUESTIONNAIRE - PHQ9
SUM OF ALL RESPONSES TO PHQ QUESTIONS 1-9: 2
1. LITTLE INTEREST OR PLEASURE IN DOING THINGS: 1
SUM OF ALL RESPONSES TO PHQ QUESTIONS 1-9: 2
SUM OF ALL RESPONSES TO PHQ QUESTIONS 1-9: 2
SUM OF ALL RESPONSES TO PHQ9 QUESTIONS 1 & 2: 2
2. FEELING DOWN, DEPRESSED OR HOPELESS: 1

## 2021-09-01 NOTE — PROGRESS NOTES
Chief Complaint   Patient presents with    Follow-up     weight loss          Assessment/Plan:  Marilin Bar was seen today for follow-up. Diagnoses and all orders for this visit:    Hyperthyroidism  -     Cancel: T4, Free; Future  -     Cancel: TSH without Reflex; Future  -     T4, Free; Future  -     TSH without Reflex;  Future    Bilious vomiting, presence of nausea not specified  -     AFL - Kenny Kerr MD, Gastroenterology, [facility]    Pericholecystic abscess  -     AFL - Kenny Kerr MD, Gastroenterology, [facility]        Vitals:    09/01/21 1603   BP: (!) 116/58   Pulse: 74   Resp: 16   Temp: 97.5 °F (36.4 °C)   SpO2: 97%       PHQ Scores 9/1/2021 4/5/2021 10/5/2020 10/30/2019 6/8/2018 3/16/2017 11/13/2015   PHQ2 Score 2 4 6 5 0 2 2   PHQ9 Score 2 19 18 17 0 5 2     Interpretation of Total Score Depression Severity: 1-4 = Minimal depression, 5-9 = Mild depression, 10-14 = Moderate depression, 15-19 = Moderately severe depression, 20-27 = Severe depression    Maribel Owens MD  [address]

## 2021-09-02 LAB — T4 FREE: 2.4 NG/DL (ref 0.9–1.8)

## 2021-10-14 DIAGNOSIS — F41.9 ANXIETY: ICD-10-CM

## 2021-10-14 RX ORDER — ALPRAZOLAM 0.5 MG/1
TABLET ORAL
Qty: 90 TABLET | Refills: 1 | Status: SHIPPED | OUTPATIENT
Start: 2021-10-14 | End: 2021-12-29

## 2021-10-21 ENCOUNTER — VIRTUAL VISIT (OUTPATIENT)
Dept: ENDOCRINOLOGY | Age: 71
End: 2021-10-21
Payer: MEDICARE

## 2021-10-21 DIAGNOSIS — C34.00 MALIGNANT NEOPLASM OF HILUS OF LUNG, UNSPECIFIED LATERALITY (HCC): ICD-10-CM

## 2021-10-21 DIAGNOSIS — Z79.4 TYPE 2 DIABETES MELLITUS WITH OTHER SPECIFIED COMPLICATION, WITH LONG-TERM CURRENT USE OF INSULIN (HCC): ICD-10-CM

## 2021-10-21 DIAGNOSIS — E05.90 HYPERTHYROIDISM: Primary | ICD-10-CM

## 2021-10-21 DIAGNOSIS — E11.69 TYPE 2 DIABETES MELLITUS WITH OTHER SPECIFIED COMPLICATION, WITH LONG-TERM CURRENT USE OF INSULIN (HCC): ICD-10-CM

## 2021-10-21 DIAGNOSIS — E78.2 MIXED HYPERLIPIDEMIA: ICD-10-CM

## 2021-10-21 DIAGNOSIS — F33.0 MILD EPISODE OF RECURRENT MAJOR DEPRESSIVE DISORDER (HCC): ICD-10-CM

## 2021-10-21 PROCEDURE — 1123F ACP DISCUSS/DSCN MKR DOCD: CPT | Performed by: INTERNAL MEDICINE

## 2021-10-21 PROCEDURE — 3051F HG A1C>EQUAL 7.0%<8.0%: CPT | Performed by: INTERNAL MEDICINE

## 2021-10-21 PROCEDURE — G8419 CALC BMI OUT NRM PARAM NOF/U: HCPCS | Performed by: INTERNAL MEDICINE

## 2021-10-21 PROCEDURE — 1036F TOBACCO NON-USER: CPT | Performed by: INTERNAL MEDICINE

## 2021-10-21 PROCEDURE — G8484 FLU IMMUNIZE NO ADMIN: HCPCS | Performed by: INTERNAL MEDICINE

## 2021-10-21 PROCEDURE — 1090F PRES/ABSN URINE INCON ASSESS: CPT | Performed by: INTERNAL MEDICINE

## 2021-10-21 PROCEDURE — 99204 OFFICE O/P NEW MOD 45 MIN: CPT | Performed by: INTERNAL MEDICINE

## 2021-10-21 PROCEDURE — 2022F DILAT RTA XM EVC RTNOPTHY: CPT | Performed by: INTERNAL MEDICINE

## 2021-10-21 PROCEDURE — 3017F COLORECTAL CA SCREEN DOC REV: CPT | Performed by: INTERNAL MEDICINE

## 2021-10-21 PROCEDURE — G8399 PT W/DXA RESULTS DOCUMENT: HCPCS | Performed by: INTERNAL MEDICINE

## 2021-10-21 PROCEDURE — G8427 DOCREV CUR MEDS BY ELIG CLIN: HCPCS | Performed by: INTERNAL MEDICINE

## 2021-10-21 PROCEDURE — 4040F PNEUMOC VAC/ADMIN/RCVD: CPT | Performed by: INTERNAL MEDICINE

## 2021-10-21 RX ORDER — METOPROLOL SUCCINATE 50 MG/1
TABLET, EXTENDED RELEASE ORAL
COMMUNITY
End: 2022-05-20

## 2021-10-21 NOTE — Clinical Note
Please schedule patient a follow-up in 4 to 5 months and placing blood work orders for now and standing orders for thyroid function test to be done on monthly basis

## 2021-10-21 NOTE — Clinical Note
Please advise patient she had a thyroid ultrasound done in June I would like her to do another thyroid ultrasound at the [address] [place] [address] before her next visit with me please mail her the orders for that

## 2021-10-21 NOTE — PROGRESS NOTES
SUBJECTIVE:  Raymond Lloyd is a 66 y.o. female who is referred here for longstanding history of remitting and relapsing hyerthyroidism. Patient was diagnosed with Hyperthyroidism approximately at age 57. On review of chart it appears that patient had suppressed TSH since 2013 and was on PTU/Tapazole which was stopped after a TSH of 77 in May 2017 but she would eventually become hyperthyroid after stopping the medication. It appears that she had been off and on on PTU/Tapazole for all these years  Previously had a thyroid uptake and scan with a 51% uptake with no hot or cold nodules identified in June 2016. Patient was treated with propylthiouracil 3 times a day. Patient stayed euthyroid for a few years and then in May 2021 she started experiencing weight loss, heat intolerance and palpitations. Symptoms at presentation were weight loss since May 2021. current complaints: palpitations, sweating, weight loss, diarrhea, change in skin,  nails, or hair  History of obstructive symptoms: difficulty swallowing No, changes in voice/hoarseness No.  History of radiation to patient's neck: NO  Recent iodine exposure: No  Family history includes no thyroid abnormalities. Family history of thyroid cancer: No    She has  diet controlled diabetes. She has Hyperlipidemia and hypertension. Patient has a history of lung carcinoma she is status post right lobectomy in June 2015 and follows with oncology she was treated with cisplatin  Patient had Covid pneumonia in October 2020  And now again 4 out of 6 members in her household have Covid.     Past Medical History:   Diagnosis Date    Antineoplastic chemotherapy induced anemia 11/12/2015    Anxiety     Benign essential hypertension     Cervical radiculitis 11/3/2017    Cervicalgia 11/3/2017    Chemotherapy induced nausea and vomiting 9/24/2015    Chronic fatigue syndrome     Chronic obstructive pulmonary disease (COPD) (HCC)     Contusion of left foot 3/8/2017    COPD, moderate (HCC) 6/1/2016    INTERPRETATION: Spirometry showed decreased FVC of 1.86 L, 77% predicted and decreased  FEV-1 of 1.16 L, 60% predicted. FEV-1/FVC ratio was decreased at 63%. No response to bronchodilators demonstrated on spirometry. Lung volumes showed increased total lung capacity of 141% predicted and residual volume of 241% predicted. Diffusion capacity showed  decreased DLCO of 66% predicted.   IMPRESSION: Mod    Diabetes mellitus type II, controlled ([address]     Encounter for chemotherapy management 10/8/2015    Hearing disorder, sensorineural 2/23/2017    Hyperlipidemia     Hyperthyroidism     Iron deficiency anemia     Left foot pain 2/22/2017    Lung cancer ([address]     Lung mass     R lung mass    Malignant neoplasm of upper lobe of right lung (HCC) 5/5/2015    Myofascial pain on right side 10/1/2015    Neoplasm related pain     Primary osteoarthritis of right hip 3/8/2017    Rotator cuff tendinitis 11/3/2017    Tinnitus 2/23/2017    Tobacco user     quit smoking in jan, 2015    Uncontrolled type 2 diabetes mellitus without complication, without long-term current use of insulin 3/11/2016     Patient Active Problem List    Diagnosis Date Noted    Centrilobular emphysema ([address] 10/30/2020    Chronic hypoxemic respiratory failure ([address] 10/30/2020    COVID-19 10/10/2020    Pneumonia due to COVID-19 virus     History of lung cancer     Ex-smoker     Metabolic encephalopathy     Mixed hyperlipidemia     Acute respiratory failure ([address] 10/09/2020    Type 2 diabetes mellitus with other specified complication ([address] 99/35/5574    Lung cancer ([address]     Carpal tunnel syndrome of right wrist 04/25/2018    COPD, moderate ([address] 06/01/2016    Major depression, recurrent ([address] 08/14/2015    Essential hypertension     Hyperthyroidism      Past Surgical History:   Procedure Laterality Date    BLADDER REPAIR  1972    tuck    HYSTERECTOMY  1972    THORACOTOMY Right 2015    Dr. Corrinne Loyal - via VATS w/RUL for adenocarcinoma    TUNNELED VENOUS PORT PLACEMENT Left 7/17/15    Dr. Corrinne Loyal  Adelita Jesus power injectable port - 8F (max 300psi; MRI conditional 3-Bhakti)     Family History   Problem Relation Age of Onset    Diabetes Father         & TB history    Hypertension Father     Diabetes Sister     Diabetes Brother     Stroke Son     Osteoporosis Mother         & Angina    Anesth Problems Neg Hx     Malig Hyperten Neg Hx     Hypotension Neg Hx     Malig Hypertherm Neg Hx     Pseudochol. Deficiency Neg Hx      Social History     Socioeconomic History    Marital status:      Spouse name: None    Number of children: None    Years of education: None    Highest education level: None   Occupational History    None   Tobacco Use    Smoking status: Former Smoker     Packs/day: 1.50     Years: 35.00     Pack years: 52.50     Types: Cigarettes     Quit date: 2015     Years since quittin.8    Smokeless tobacco: Never Used   Vaping Use    Vaping Use: Some days    Substances: Nicotine   Substance and Sexual Activity    Alcohol use: No     Alcohol/week: 0.0 standard drinks    Drug use: Never    Sexual activity: None   Other Topics Concern    None   Social History Narrative    None     Social Determinants of Health     Financial Resource Strain: Low Risk     Difficulty of Paying Living Expenses: Not hard at all   Food Insecurity: No Food Insecurity    Worried About Running Out of Food in the Last Year: Never true    Rene of Food in the Last Year: Never true   Transportation Needs:     Lack of Transportation (Medical):      Lack of Transportation (Non-Medical):    Physical Activity:     Days of Exercise per Week:     Minutes of Exercise per Session:    Stress:     Feeling of Stress :    Social Connections:     Frequency of Communication with Friends and Family:     Frequency of Social Gatherings with Friends and Family:     Attends Jewish Services:     Active Member of Clubs or Organizations:     Attends Club or Organization Meetings:     Marital Status:    Intimate Partner Violence:     Fear of Current or Ex-Partner:     Emotionally Abused:     Physically Abused:     Sexually Abused:      Current Outpatient Medications   Medication Sig Dispense Refill    metoprolol succinate (TOPROL XL) 50 MG extended release tablet metoprolol succinate ER 50 mg tablet,extended release 24 hr   TAKE 1 TABLET BY MOUTH EVERY DAY      ALPRAZolam (XANAX) 0.5 MG tablet TAKE 1 TABLET BY MOUTH THREE TIMES DAILY AS NEEDED 90 tablet 1    buPROPion (WELLBUTRIN SR) 150 MG extended release tablet TAKE 1 TABLET BY MOUTH TWICE DAILY 180 tablet 0    methIMAzole (TAPAZOLE) 5 MG tablet Take 5 mg by mouth 3 times daily      albuterol sulfate  (90 Base) MCG/ACT inhaler Inhale 2 puffs into the lungs every 6 hours as needed for Wheezing 1 Inhaler 11    fluticasone-vilanterol (BREO ELLIPTA) 100-25 MCG/INH AEPB inhaler Inhale 1 puff into the lungs daily 1 each 5    atorvastatin (LIPITOR) 20 MG tablet TAKE 1 TABLET BY MOUTH EVERY DAY 90 tablet 1    melatonin 3 MG TABS tablet Take 3 mg by mouth nightly as needed      oxyCODONE (ROXICODONE) 5 MG immediate release tablet Take 5 mg by mouth every 4 hours as needed for Pain (Oxycodone 5 mg Immediately Q 4-6 Hours PRN).  Takes for cancer pain (phantom pain) where right rib lobectomy pain      ACCU-CHEK IRA PLUS strip TEST FOUR TIMES DAILY 100 strip 3    ACCU-CHEK SOFTCLIX LANCETS MISC USE FOUR TIMES DAILY AS DIRECTED 400 each 2    Glucose Blood (ACCU-CHEK IRA PLUS VI) 1 each by In Vitro route 4 times daily Indications: Please dispense Accu-Chek Ira Plus Test Strips with DX code E11.9       Blood Glucose Monitoring Suppl (ACCU-CHEK IRA PLUS) w/Device KIT 1 each by Does not apply route daily Please dispense Accu-Chek Ira Plus Meter with DX code E11.9 1 kit 0    aspirin 81 MG chewable tablet Take 81 mg by mouth daily Indications: OTC       No current facility-administered medications for this visit. Allergies   Allergen Reactions    Codeine Anxiety         Review of Systems:  I have reviewed the review of system questionnaire filled by the patient . Patient was advised to contact PCP for non endocrine signs and symptoms       OBJECTIVE:   There were no vitals taken for this visit. Wt Readings from Last 3 Encounters:   09/01/21 88 lb 6.4 oz (40.1 kg)   06/14/21 93 lb (42.2 kg)   06/02/21 101 lb (45.8 kg)       Physical Exam:    Constitutional: no acute distress, well appearing and well nourished  Psychiatric: oriented to person, place and time, judgement and insight and normal, recent and remote memory intact and mood and affect are normal  Skin: skin and subcutaneous tissue is normal without visible mass,   Head and Face: visual inspection  of head and face revealed no abnormalities  Eyes: visual inspection showed no lid or conjunctival swelling, erythema or discharge, pupils are normal, equal, round  Ears/Nose: external inspection of ears and nose revealed no abnormalities, hearing is grossly normal  Oropharynx/Mouth/Face: lips, tongue and gums appear  normal with no lesions, the voice quality was normal  Neck: neck appears symmetric, with no visible masses,   Pulmonary: no increased work of breathing or signs of respiratory distress,  Musculoskeletal: normal on inspection    Neurological: normal coordination and normal general cortical function    NDING:   The thyroid scan shows a heterogeneous appearance to the bilateral thyroid gland, with dominant photopenia in the left upper lobe. There is overall diffusely increased uptake in the thyroid gland. The STROUD thyroid uptake is performed and measures 41% at 24 hours.      IMPRESSION:   Thyroid uptake and scan findings show overall hyperfunctioning gland suggestive of Graves' disease, with gland heterogeneity suggestive of chronic disease versus superimposed on a multinodular goiter. Close follow-up of nodules seen on ultrasound is also recommended. Electronically Signed by: Yessi Matthews MD, 7/31/2021 11:33 AM     Date: 07/31/21   Received From: [facility]:   1.  Enlarged, multinodular thyroid, as discussed above. Follow-up ultrasound in 12 months recommended. Electronically Signed by: Cachorro Muniz MD, 6/22/2021 6:03 PM    Lab Review:  Lab Results   Component Value Date    TSH <0.01 09/01/2021    TSH <0.01 06/02/2021    TSH 10.71 10/05/2020     Lab Results   Component Value Date    T4FREE 2.4 09/01/2021        ASSESSMENT/PLAN:  1. Hyperthyroidism in the setting of multinodular goiter    Patient restarted methimazole on September 2, 2021  I will check a thyroid function test now and make adjustments methimazole 5 mg 3 times daily that she has been taking for over a month. She was advised that she would need monthly blood work to keep in close eye on her thyroid function test which seems to fluctuate from hypo to hyper on medication. She was also advised to repeat her thyroid ultrasound to document stability in the size of the nodules noted on the previous ultrasound in June. Patient had a thyroid uptake and scan in July 2021 at [facility]. which showed overall Graves'-like pattern with a uptake of 41%  Thyroid ultrasound was done in June 2021 at [facility] showing multinodular goiter  We will repeat follow-up in 6 months    - TSH without Reflex; Standing  - T4, Free; Standing  - T3; Standing  - Anti-Thyroglobulin Antibody; Future  - Thyroid Stimulating Immunoglobulin; Future  - Thyrotropin receptor antibody; Future  - Thyroid Peroxidase Antibody; Future  - Comprehensive Metabolic Panel; Future  - CBC Auto Differential; Future    2.  Type 2 diabetes mellitus with other specified complication, with long-term current use of insulin (HCC)  Apparently patient is now diet controlled  Metformin was stopped after her weight loss    3. Mixed hyperlipidemia  Patient is on statins    4. Mild episode of recurrent major depressive disorder ([facility]  Stable as per primary care physician    5. Malignant neoplasm of hilus of lung, unspecified laterality [facility])  Patient follows with pulmonology as well as oncology          Reviewed and/or ordered clinical lab results Yes  Reviewed and/or ordered radiology tests Yes   Reviewed and/or ordered other diagnostic tests Yes  Made a decision to obtain old records Yes  Reviewed and summarized old records Yes      Gina Blackwell was counseled regarding symptoms of current diagnosis, course and complications of disease if inadequately treated, side effects of medications, diagnosis, treatment options, and prognosis, risks, benefits, complications, and alternatives of treatment, labs, imaging and other studies and treatment targets and goals. She understands instructions and counseling. I spent  50 + minutes which includes reviewing patient chart , interpreting previous lab results  , discussing and providing counseling and coordinating care of patient's multiple health issues with  the patient. Return in about 3 months (around 1/21/2022). Please note that some or all of this report was generated using voice recognition software. Please notify me in case of any questions about the content of this document, as some errors in transcription may have occurred .

## 2021-10-21 NOTE — LETTER
[facility]  [address] [facility]  [address] [address]  Phone: [phone]  Fax: [phone]    Vi Mehta MD    October 21, 2021     Reggie Dancer, [id number] [facility]    Patient: Eben Galarza   MR Number: [id number]   YOB: 1950   Date of Visit: 10/21/2021       Dear Reggie Dancer: Thank you for referring Patt Rosa to me for evaluation/treatment. Below are the relevant portions of my assessment and plan of care. If you have questions, please do not hesitate to call me. I look forward to following Afshin Skill along with you.     Sincerely,      Vi Mehta MD

## 2021-10-22 NOTE — PROGRESS NOTES
Left VM for patient to call office back to schedule a f/u appt. Reminder letter, US order and lab orders placed in the mail.

## 2021-10-28 ENCOUNTER — TELEPHONE (OUTPATIENT)
Dept: ENDOCRINOLOGY | Age: 71
End: 2021-10-28

## 2021-10-28 NOTE — TELEPHONE ENCOUNTER
Was able to speak to [facility] lab Rufina Prajapati ) they will add on labs not completed     Faxed over labs     Fax was successful and scanned

## 2021-10-28 NOTE — TELEPHONE ENCOUNTER
Called the pt to inquire on where she got her bloodwork completed at    The pt was not available at the time of me calling    ALETHEA

## 2021-11-03 ENCOUNTER — OFFICE VISIT (OUTPATIENT)
Dept: PULMONOLOGY | Age: 71
End: 2021-11-03
Payer: MEDICARE

## 2021-11-03 VITALS — OXYGEN SATURATION: 95 % | HEART RATE: 98 BPM

## 2021-11-03 DIAGNOSIS — J43.2 CENTRILOBULAR EMPHYSEMA (HCC): ICD-10-CM

## 2021-11-03 DIAGNOSIS — J96.11 CHRONIC HYPOXEMIC RESPIRATORY FAILURE (HCC): ICD-10-CM

## 2021-11-03 DIAGNOSIS — C34.2 MALIGNANT NEOPLASM OF MIDDLE LOBE OF RIGHT LUNG (HCC): ICD-10-CM

## 2021-11-03 DIAGNOSIS — J44.9 COPD, MODERATE (HCC): Primary | ICD-10-CM

## 2021-11-03 PROCEDURE — G8427 DOCREV CUR MEDS BY ELIG CLIN: HCPCS | Performed by: INTERNAL MEDICINE

## 2021-11-03 PROCEDURE — G8399 PT W/DXA RESULTS DOCUMENT: HCPCS | Performed by: INTERNAL MEDICINE

## 2021-11-03 PROCEDURE — G8419 CALC BMI OUT NRM PARAM NOF/U: HCPCS | Performed by: INTERNAL MEDICINE

## 2021-11-03 PROCEDURE — 3017F COLORECTAL CA SCREEN DOC REV: CPT | Performed by: INTERNAL MEDICINE

## 2021-11-03 PROCEDURE — 99214 OFFICE O/P EST MOD 30 MIN: CPT | Performed by: INTERNAL MEDICINE

## 2021-11-03 PROCEDURE — 3023F SPIROM DOC REV: CPT | Performed by: INTERNAL MEDICINE

## 2021-11-03 PROCEDURE — G8484 FLU IMMUNIZE NO ADMIN: HCPCS | Performed by: INTERNAL MEDICINE

## 2021-11-03 PROCEDURE — 4040F PNEUMOC VAC/ADMIN/RCVD: CPT | Performed by: INTERNAL MEDICINE

## 2021-11-03 PROCEDURE — 1090F PRES/ABSN URINE INCON ASSESS: CPT | Performed by: INTERNAL MEDICINE

## 2021-11-03 PROCEDURE — 1036F TOBACCO NON-USER: CPT | Performed by: INTERNAL MEDICINE

## 2021-11-03 PROCEDURE — G8926 SPIRO NO PERF OR DOC: HCPCS | Performed by: INTERNAL MEDICINE

## 2021-11-03 PROCEDURE — 1123F ACP DISCUSS/DSCN MKR DOCD: CPT | Performed by: INTERNAL MEDICINE

## 2021-11-03 RX ORDER — ALBUTEROL SULFATE 90 UG/1
2 AEROSOL, METERED RESPIRATORY (INHALATION) EVERY 6 HOURS PRN
Qty: 1 EACH | Refills: 5 | Status: SHIPPED | OUTPATIENT
Start: 2021-11-03 | End: 2022-05-23

## 2021-11-03 NOTE — PROGRESS NOTES
[facility]  Progress Note  Padmini Boss MD       [address]   YOB: 1950    Date of Visit:  11/3/2021    Assessment/Plan:  1. COPD, moderate (HCC)/centrilobular emphysema  PFT 5/16:  INTERPRETATION: Spirometry showed decreased FVC of 1.86 L, 77% predicted and  decreased   FEV-1 of 1.16 L, 60% predicted. FEV-1/FVC ratio was decreased at 63%. No  response to bronchodilators demonstrated on spirometry. Lung volumes showed  increased total lung capacity of 141% predicted and residual volume of 241%  predicted. Diffusion capacity showed   decreased DLCO of 66% predicted.       IMPRESSION: Moderate obstructive defect on spirometry with no response to  bronchodilators. Both air trapping and hyperinflation were present on lung  volumes and moderate decrease in diffusion capacity. In comparison to the  test that was done in February of 2015, [facility] has decreased by 11%, FEV-1 by  23% with no significant change in total lung capacity or DLCO. Medication Management:  The patient benefits from the medical regimen and reports no complications. · Breo   · Albuterol  · Cost is an issue for her     2. Malignant neoplasm of upper lobe of right lung (HCC)  · S/p RULobectomy  · CT chest 3/21:   FINDINGS:   Mediastinum: Normal size lymph nodes throughout the mediastinum again noted. No acute findings.  No pericardial effusion.  Left-sided port extends into   the lower SVC.       Lungs/pleura: No suspicious pulmonary nodules.  No pulmonary mass.  Stable   postsurgical changes from right upper lobectomy.  No evidence of pneumonia or   pulmonary edema.  No pleural effusion.       Upper Abdomen: Negative.       Soft Tissues/Bones: No acute or aggressive osseous findings.  Normal size   lymph nodes throughout the chest wall including the axillae.           Impression   No evidence of recurrent or metastatic disease.        · This revealed evidence of a small pulmonary nodule in the right upper lobe about 3 mm in diameter. · Additionally, CT imaging revealed evidence of centrilobular emphysema  · Repeat CT chest around 3/22    4. Chronic hypoxemic respiratory failure  Oxygen saturation on room air with exertion in the office today is 81%. The patient would benefit from supplemental oxygen with exertion and sleep  · The patient is independently mobile within the home and would benefit from a portable oxygen concentrator  · She should be on oxygen 24 hours/day. She is doing better with this. Recommend COVID vaccine -- She still has not gotten this  Also declines the flu shot      FOLLOW UP: 6 months      Chief Complaint   Patient presents with    Shortness of Breath     6 month f.u.       HPI  The patient presents with a chief complaint of moderate shortness of breath related to Severe COPD of many years duration. He has mild associated cough. Exertion is a modifying factor. Additionally, the patient does have a history of prior lung cancer surgery with right upper lobectomy. She is wearing O2 to the office today. Review of Systems  No Chest pain, Nausea or vomiting reported    Physical Exam:  Well developed, well nourished  Alert and oriented  Sclera is clear  No cervical adenopathy  No JVD. Chest examination is scattered squeaks. Cardiac examination reveals regular rate and rhythm without murmur, gallop or rub. The abdomen is soft, nontender and nondistended. There is no clubbing, cyanosis or edema of the extremities. There is no obvious skin rash. No focal neuro deficicts  Normal mood and affect    Allergies   Allergen Reactions    Codeine Anxiety     Prior to Visit Medications    Medication Sig Taking?  Authorizing Provider   metoprolol succinate (TOPROL XL) 50 MG extended release tablet metoprolol succinate ER 50 mg tablet,extended release 24 hr   TAKE 1 TABLET BY MOUTH EVERY DAY  Historical Provider, MD   ALPRAZolam (XANAX) 0.5 MG tablet TAKE 1 TABLET BY MOUTH THREE TIMES DAILY AS NEEDED  Blanca Ramon MD   buPROPion [facility] SR) 150 MG extended release tablet TAKE 1 TABLET BY MOUTH TWICE DAILY  Blanca Ramon MD   methIMAzole (TAPAZOLE) 5 MG tablet Take 5 mg by mouth 3 times daily  Historical Provider, MD   albuterol sulfate  (90 Base) MCG/ACT inhaler Inhale 2 puffs into the lungs every 6 hours as needed for Wheezing  Cachorro Dias MD   fluticasone-vilanterol (BREO ELLIPTA) 100-25 MCG/INH AEPB inhaler Inhale 1 puff into the lungs daily  Cachorro Dias MD   atorvastatin (LIPITOR) 20 MG tablet TAKE 1 TABLET BY MOUTH EVERY DAY  Blanca Ramon MD   melatonin 3 MG TABS tablet Take 3 mg by mouth nightly as needed  Historical Provider, MD   oxyCODONE (ROXICODONE) 5 MG immediate release tablet Take 5 mg by mouth every 4 hours as needed for Pain (Oxycodone 5 mg Immediately Q 4-6 Hours PRN). Takes for cancer pain (phantom pain) where right rib lobectomy pain  Historical Provider, MD   ACCU-CHEK IRA PLUS strip TEST FOUR TIMES DAILY  Blanca Ramon MD   ACCU-CHEK SOFTCLIX LANCETS MISC USE FOUR TIMES DAILY AS DIRECTED  Blanca Ramon MD   Glucose Blood (ACCU-CHEK IRA PLUS VI) 1 each by In Vitro route 4 times daily Indications: Please dispense Accu-Chek Ira Plus Test Strips with DX code E11.9   Historical Provider, MD   Blood Glucose Monitoring Suppl (ACCU-CHEK IRA PLUS) w/Device KIT 1 each by Does not apply route daily Please dispense Accu-Chek Ira Plus Meter with DX code E11.9  Blanca Ramon MD   aspirin 81 MG chewable tablet Take 81 mg by mouth daily Indications: OTC  Historical Provider, MD       Vitals:    11/03/21 0839   Pulse: 98   SpO2: 95%     There is no height or weight on file to calculate BMI.      Wt Readings from Last 3 Encounters:   09/01/21 88 lb 6.4 oz (40.1 kg)   06/14/21 93 lb (42.2 kg)   06/02/21 101 lb (45.8 kg)     BP Readings from Last 3 Encounters:   09/01/21 (!) 116/58   06/02/21 (!) 144/60   05/03/21 131/78 Social History     Tobacco Use   Smoking Status Former Smoker    Packs/day: 1.50    Years: 35.00    Pack years: 52.50    Types: Cigarettes    Quit date: 2015    Years since quittin.8   Smokeless Tobacco Never Used

## 2021-11-11 ENCOUNTER — PATIENT MESSAGE (OUTPATIENT)
Dept: ENDOCRINOLOGY | Age: 71
End: 2021-11-11

## 2021-11-11 DIAGNOSIS — E05.90 HYPERTHYROIDISM: Primary | ICD-10-CM

## 2021-11-11 RX ORDER — METHIMAZOLE 5 MG/1
TABLET ORAL
Qty: 30 TABLET | Refills: 3 | Status: SHIPPED | OUTPATIENT
Start: 2021-11-11 | End: 2021-11-11

## 2021-11-11 NOTE — TELEPHONE ENCOUNTER
From: Vivian Ewing  To: Dr. Freitas Degree  Sent: 11/11/2021 9:21 AM EST  Subject: Prescription Question    I need a refill on my thyroid medicine, Dr Citlali Arellano Wants me to stay on it. I use [facility] in [place], [place]. If you need to contact me my number is . Fernanda Cardoso date of birth [birth year].  Thank you

## 2021-11-19 ENCOUNTER — TELEPHONE (OUTPATIENT)
Dept: ENDOCRINOLOGY | Age: 71
End: 2021-11-19

## 2021-11-19 DIAGNOSIS — E05.90 HYPERTHYROIDISM: Primary | ICD-10-CM

## 2021-11-19 NOTE — TELEPHONE ENCOUNTER
Pt called and wanted to let you know that she had bloodwork done yesterday at [facility]. Also, follow up scheduled for march.

## 2021-11-29 NOTE — TELEPHONE ENCOUNTER
Please advise patient that I have reviewed her lab results and they still show abnormal thyroid function test, I would like her to continue with the current dose of thyroid medication and repeat the labs in 1 month. Her previous blood work in October does show that she has autoimmune thyroid disease and would need to be on the medication most likely indefinitely. Also I had ordered a thyroid ultrasound that she still has not completed.

## 2021-11-30 NOTE — TELEPHONE ENCOUNTER
Patient aware. Lab orders mailed. Patient thought she was supposed to get her [address] done before her next appt so she has it scheduled for February.

## 2021-12-28 ENCOUNTER — TELEPHONE (OUTPATIENT)
Dept: ENDOCRINOLOGY | Age: 71
End: 2021-12-28

## 2021-12-28 NOTE — TELEPHONE ENCOUNTER
Pt calling to let us know she did her labs on 12/27/21 @ [facility].  Results are in [facility]    Pt's next appt 3/10/22    # [phone]

## 2021-12-29 DIAGNOSIS — F41.9 ANXIETY: ICD-10-CM

## 2021-12-29 RX ORDER — ALPRAZOLAM 0.5 MG/1
TABLET ORAL
Qty: 90 TABLET | Refills: 2 | Status: SHIPPED | OUTPATIENT
Start: 2021-12-29 | End: 2022-04-20

## 2021-12-29 NOTE — TELEPHONE ENCOUNTER
Please advise patient if she is taking 15 mg of Tapazole daily(5 mg 3 times a day and she can reduce the dose to 10 mg once a day)

## 2022-01-20 ENCOUNTER — TELEPHONE (OUTPATIENT)
Dept: ENDOCRINOLOGY | Age: 72
End: 2022-01-20

## 2022-01-20 DIAGNOSIS — E05.90 HYPERTHYROIDISM: Primary | ICD-10-CM

## 2022-02-10 DIAGNOSIS — E04.1 THYROID NODULE: Primary | ICD-10-CM

## 2022-02-16 ENCOUNTER — TELEPHONE (OUTPATIENT)
Dept: ENDOCRINOLOGY | Age: 72
End: 2022-02-16

## 2022-02-16 DIAGNOSIS — E04.1 THYROID NODULE: Primary | ICD-10-CM

## 2022-02-16 NOTE — TELEPHONE ENCOUNTER
Returned [employer]. She needs [employer] to be be in the scheduling instructions. New order placed.

## 2022-02-23 LAB
T4 FREE: 0.51 NG/DL (ref 0.61–1.12)
TSH ULTRASENSITIVE: 4.66 UIU/ML (ref 0.45–5.33)

## 2022-02-24 ENCOUNTER — TELEPHONE (OUTPATIENT)
Dept: ENDOCRINOLOGY | Age: 72
End: 2022-02-24

## 2022-02-25 LAB — T3 TOTAL: 110 NG/DL (ref 71–180)

## 2022-02-28 ENCOUNTER — HOSPITAL ENCOUNTER (OUTPATIENT)
Dept: ULTRASOUND IMAGING | Age: 72
Discharge: HOME | End: 2022-02-28
Payer: MEDICARE

## 2022-02-28 VITALS
RESPIRATION RATE: 16 BRPM | DIASTOLIC BLOOD PRESSURE: 94 MMHG | SYSTOLIC BLOOD PRESSURE: 136 MMHG | HEART RATE: 96 BPM | OXYGEN SATURATION: 95 % | TEMPERATURE: 98.6 F

## 2022-02-28 DIAGNOSIS — E04.1 THYROID NODULE: ICD-10-CM

## 2022-02-28 PROCEDURE — 6370000000 HC RX 637 (ALT 250 FOR IP): Performed by: INTERNAL MEDICINE

## 2022-02-28 PROCEDURE — 88173 CYTOPATH EVAL FNA REPORT: CPT

## 2022-02-28 PROCEDURE — 2709999900 US FINE NEEDLE ASPIRATION

## 2022-02-28 PROCEDURE — 2500000003 HC RX 250 WO HCPCS: Performed by: INTERNAL MEDICINE

## 2022-02-28 PROCEDURE — 88305 TISSUE EXAM BY PATHOLOGIST: CPT

## 2022-02-28 RX ORDER — LIDOCAINE HYDROCHLORIDE 10 MG/ML
5 INJECTION, SOLUTION EPIDURAL; INFILTRATION; INTRACAUDAL; PERINEURAL ONCE
Status: COMPLETED | OUTPATIENT
Start: 2022-02-28 | End: 2022-02-28

## 2022-02-28 RX ORDER — BACITRACIN ZINC AND POLYMYXIN B SULFATE 500; 1000 [USP'U]/G; [USP'U]/G
OINTMENT TOPICAL ONCE
Status: COMPLETED | OUTPATIENT
Start: 2022-02-28 | End: 2022-02-28

## 2022-02-28 RX ADMIN — BACITRACIN ZINC AND POLYMYXIN B SULFATE: at 10:51

## 2022-02-28 RX ADMIN — LIDOCAINE HYDROCHLORIDE 5 ML: 10 INJECTION, SOLUTION EPIDURAL; INFILTRATION; INTRACAUDAL; PERINEURAL at 10:51

## 2022-02-28 NOTE — PROGRESS NOTES
Pt arrives to pre procedure area in stable condition from home. Vital signs stable. Assessment completed see flow sheet. . Procedure explained to pt who states understanding and questions answered. Consent signed.

## 2022-03-01 ENCOUNTER — TELEPHONE (OUTPATIENT)
Dept: PULMONOLOGY | Age: 72
End: 2022-03-01

## 2022-03-01 NOTE — TELEPHONE ENCOUNTER
Spoke with pt and she needs her CT order faxed to [facility] in [place].   Fax # [phone]

## 2022-03-10 ENCOUNTER — OFFICE VISIT (OUTPATIENT)
Dept: ENDOCRINOLOGY | Age: 72
End: 2022-03-10
Payer: MEDICARE

## 2022-03-10 VITALS
WEIGHT: 117.6 LBS | HEIGHT: 60 IN | BODY MASS INDEX: 23.09 KG/M2 | SYSTOLIC BLOOD PRESSURE: 162 MMHG | RESPIRATION RATE: 16 BRPM | HEART RATE: 105 BPM | DIASTOLIC BLOOD PRESSURE: 93 MMHG

## 2022-03-10 DIAGNOSIS — E04.9 GOITER: ICD-10-CM

## 2022-03-10 DIAGNOSIS — E11.69 TYPE 2 DIABETES MELLITUS WITH OTHER SPECIFIED COMPLICATION, UNSPECIFIED WHETHER LONG TERM INSULIN USE (HCC): Primary | ICD-10-CM

## 2022-03-10 PROCEDURE — 1090F PRES/ABSN URINE INCON ASSESS: CPT | Performed by: INTERNAL MEDICINE

## 2022-03-10 PROCEDURE — 1036F TOBACCO NON-USER: CPT | Performed by: INTERNAL MEDICINE

## 2022-03-10 PROCEDURE — G8420 CALC BMI NORM PARAMETERS: HCPCS | Performed by: INTERNAL MEDICINE

## 2022-03-10 PROCEDURE — 3017F COLORECTAL CA SCREEN DOC REV: CPT | Performed by: INTERNAL MEDICINE

## 2022-03-10 PROCEDURE — 3046F HEMOGLOBIN A1C LEVEL >9.0%: CPT | Performed by: INTERNAL MEDICINE

## 2022-03-10 PROCEDURE — G8427 DOCREV CUR MEDS BY ELIG CLIN: HCPCS | Performed by: INTERNAL MEDICINE

## 2022-03-10 PROCEDURE — 99214 OFFICE O/P EST MOD 30 MIN: CPT | Performed by: INTERNAL MEDICINE

## 2022-03-10 PROCEDURE — G8399 PT W/DXA RESULTS DOCUMENT: HCPCS | Performed by: INTERNAL MEDICINE

## 2022-03-10 PROCEDURE — G8484 FLU IMMUNIZE NO ADMIN: HCPCS | Performed by: INTERNAL MEDICINE

## 2022-03-10 PROCEDURE — 4040F PNEUMOC VAC/ADMIN/RCVD: CPT | Performed by: INTERNAL MEDICINE

## 2022-03-10 PROCEDURE — 2022F DILAT RTA XM EVC RTNOPTHY: CPT | Performed by: INTERNAL MEDICINE

## 2022-03-10 PROCEDURE — 1123F ACP DISCUSS/DSCN MKR DOCD: CPT | Performed by: INTERNAL MEDICINE

## 2022-03-10 NOTE — PROGRESS NOTES
SUBJECTIVE:  Chelly Stout is a 66 y.o. female who is referred here for longstanding history of remitting and relapsing hyerthyroidism. Patient was diagnosed with Hyperthyroidism approximately at age 57. On review of chart it appears that patient had suppressed TSH since 2013 and was on PTU/Tapazole which was stopped after a TSH of 77 in May 2017 but she would eventually become hyperthyroid after stopping the medication. It appears that she had been off and on on PTU/Tapazole for all these years  Previously had a thyroid uptake and scan with a 51% uptake with no hot or cold nodules identified in June 2016. Patient was treated with propylthiouracil 3 times a day. Patient stayed euthyroid for a few years and then in May 2021 she started experiencing weight loss, heat intolerance and palpitations. Symptoms at presentation were weight loss since May 2021. current complaints: palpitations, sweating, weight loss, diarrhea, change in skin,  nails, or hair  History of obstructive symptoms: difficulty swallowing No, changes in voice/hoarseness No.  History of radiation to patient's neck: NO  Recent iodine exposure: No  Family history includes no thyroid abnormalities. Family history of thyroid cancer: No    She has  diet controlled diabetes. She has Hyperlipidemia and hypertension. Patient has a history of lung carcinoma she is status post right lobectomy in June 2015 and follows with oncology she was treated with cisplatin  Patient had Covid pneumonia in October 2020      INTERIM  She has been taking 5 mg BID of tapazole   Patient notes improvement in her palpitations but continues to have some palpitation. She underwent a CT scan of chest yesterday which showed thyroid enlargement and no particular nodules.       Past Medical History:   Diagnosis Date    Antineoplastic chemotherapy induced anemia 11/12/2015    Anxiety     Benign essential hypertension     Cervical radiculitis 11/3/2017    Cervicalgia 11/3/2017    Chemotherapy induced nausea and vomiting 9/24/2015    Chronic fatigue syndrome     Chronic obstructive pulmonary disease (COPD) ([facility])     Contusion of left foot 3/8/2017    COPD, moderate ([facility]) 6/1/2016    INTERPRETATION: Spirometry showed decreased FVC of 1.86 L, 77% predicted and decreased  FEV-1 of 1.16 L, 60% predicted. FEV-1/FVC ratio was decreased at 63%. No response to bronchodilators demonstrated on spirometry. Lung volumes showed increased total lung capacity of 141% predicted and residual volume of 241% predicted. Diffusion capacity showed  decreased DLCO of 66% predicted.   IMPRESSION: Mod    Diabetes mellitus type II, controlled ([address]     Encounter for chemotherapy management 10/8/2015    Hearing disorder, sensorineural 2/23/2017    Hyperlipidemia     Hyperthyroidism     Iron deficiency anemia     Left foot pain 2/22/2017    Lung cancer ([address]     Lung mass     R lung mass    Malignant neoplasm of upper lobe of right lung (HCC) 5/5/2015    Myofascial pain on right side 10/1/2015    Neoplasm related pain     Primary osteoarthritis of right hip 3/8/2017    Rotator cuff tendinitis 11/3/2017    Tinnitus 2/23/2017    Tobacco user     quit smoking in jan, 2015    Uncontrolled type 2 diabetes mellitus without complication, without long-term current use of insulin 3/11/2016     Patient Active Problem List    Diagnosis Date Noted    Centrilobular emphysema ([address] 10/30/2020    Chronic hypoxemic respiratory failure ([address] 10/30/2020    COVID-19 10/10/2020    Pneumonia due to COVID-19 virus     History of lung cancer     Ex-smoker     Metabolic encephalopathy     Mixed hyperlipidemia     Acute respiratory failure ([address] 10/09/2020    Type 2 diabetes mellitus with other specified complication ([address] 81/10/8971    Lung cancer ([address]     Carpal tunnel syndrome of right wrist 04/25/2018    COPD, moderate ([address] 06/01/2016    Major depression, recurrent ([address] 2015    Essential hypertension     Hyperthyroidism      Past Surgical History:   Procedure Laterality Date    BLADDER REPAIR  1972    tuck    HYSTERECTOMY  1972    THORACOTOMY Right 2015    Dr. Ric Nunez - via VATS w/RUL for adenocarcinoma    TUNNELED VENOUS PORT PLACEMENT Left 7/17/15    Dr. Ric Nunez  Teryl Hair power injectable port - 8F (max 300psi; MRI conditional 3-Bhakti)     Family History   Problem Relation Age of Onset    Diabetes Father         & TB history    Hypertension Father     Diabetes Sister     Diabetes Brother     Stroke Son     Osteoporosis Mother         & Angina    Anesth Problems Neg Hx     Malig Hyperten Neg Hx     Hypotension Neg Hx     Malig Hypertherm Neg Hx     Pseudochol. Deficiency Neg Hx      Social History     Socioeconomic History    Marital status:      Spouse name: None    Number of children: None    Years of education: None    Highest education level: None   Occupational History    None   Tobacco Use    Smoking status: Former Smoker     Packs/day: 1.50     Years: 35.00     Pack years: 52.50     Types: Cigarettes     Quit date: 2015     Years since quittin.1    Smokeless tobacco: Never Used   Vaping Use    Vaping Use: Some days    Substances: Nicotine   Substance and Sexual Activity    Alcohol use: No     Alcohol/week: 0.0 standard drinks    Drug use: Never    Sexual activity: None   Other Topics Concern    None   Social History Narrative    None     Social Determinants of Health     Financial Resource Strain: Low Risk     Difficulty of Paying Living Expenses: Not hard at all   Food Insecurity: No Food Insecurity    Worried About Running Out of Food in the Last Year: Never true    Rene of Food in the Last Year: Never true   Transportation Needs:     Lack of Transportation (Medical): Not on file    Lack of Transportation (Non-Medical):  Not on file   Physical Activity:     Days of Exercise per Week: Not on file    Minutes of Exercise per Session: Not on file   Stress:     Feeling of Stress : Not on file   Social Connections:     Frequency of Communication with Friends and Family: Not on file    Frequency of Social Gatherings with Friends and Family: Not on file    Attends Druze Services: Not on file    Active Member of [address] [place] [address] or Organizations: Not on file    Attends Club or Organization Meetings: Not on file    Marital Status: Not on file   Intimate Partner Violence:     Fear of Current or Ex-Partner: Not on file    Emotionally Abused: Not on file    Physically Abused: Not on file    Sexually Abused: Not on file   Housing Stability:     Unable to Pay for Housing in the Last Year: Not on file    Number of Places Lived in the Last Year: Not on file    Unstable Housing in the Last Year: Not on file     Current Outpatient Medications   Medication Sig Dispense Refill    ALPRAZolam (XANAX) 0.5 MG tablet TAKE 1 TABLET BY MOUTH THREE TIMES DAILY AS NEEDED 90 tablet 2    methIMAzole (TAPAZOLE) 5 MG tablet TAKE 1 TABLET(5 MG) BY MOUTH THREE TIMES DAILY 270 tablet 3    buPROPion (WELLBUTRIN SR) 150 MG extended release tablet TAKE 1 TABLET BY MOUTH TWICE DAILY 180 tablet 1    albuterol sulfate  (90 Base) MCG/ACT inhaler Inhale 2 puffs into the lungs every 6 hours as needed for Wheezing 1 each 5    fluticasone-vilanterol (BREO ELLIPTA) 100-25 MCG/INH AEPB inhaler Inhale 1 puff into the lungs daily 1 each 5    melatonin 3 MG TABS tablet Take 3 mg by mouth nightly as needed      oxyCODONE (ROXICODONE) 5 MG immediate release tablet Take 5 mg by mouth every 4 hours as needed for Pain (Oxycodone 5 mg Immediately Q 4-6 Hours PRN).  Takes for cancer pain (phantom pain) where right rib lobectomy pain      aspirin 81 MG chewable tablet Take 81 mg by mouth daily Indications: OTC      metoprolol succinate (TOPROL XL) 50 MG extended release tablet metoprolol succinate ER 50 mg tablet,extended release 24 hr   TAKE 1 TABLET BY MOUTH EVERY DAY      Glucose Blood (ACCU-CHEK IRA PLUS VI) 1 each by In Vitro route 4 times daily Indications: Please dispense Accu-Chek Ira Plus Test Strips with DX code E11.9        No current facility-administered medications for this visit. Allergies   Allergen Reactions    Codeine Anxiety         Review of Systems:  I have reviewed the review of system questionnaire filled by the patient . Patient was advised to contact PCP for non endocrine signs and symptoms       OBJECTIVE:   BP (!) 162/93   Pulse 105   Resp 16   Ht 5' (1.524 m)   Wt 117 lb 9.6 oz (53.3 kg)   BMI 22.97 kg/m²   Wt Readings from Last 3 Encounters:   03/10/22 117 lb 9.6 oz (53.3 kg)   09/01/21 88 lb 6.4 oz (40.1 kg)   06/14/21 93 lb (42.2 kg)       Physical Exam:    Constitutional: no acute distress, well appearing and well nourished  Psychiatric: oriented to person, place and time, judgement and insight and normal, recent and remote memory intact and mood and affect are normal  Skin: skin and subcutaneous tissue is normal without visible mass,   Head and Face: visual inspection  of head and face revealed no abnormalities  Eyes: visual inspection showed no lid or conjunctival swelling, erythema or discharge, pupils are normal, equal, round  Ears/Nose: external inspection of ears and nose revealed no abnormalities, hearing is grossly normal  Oropharynx/Mouth/Face: lips, tongue and gums appear  normal with no lesions, the voice quality was normal  Neck: neck appears symmetric, with no visible masses,   Pulmonary: no increased work of breathing or signs of respiratory distress,  Musculoskeletal: normal on inspection    Neurological: normal coordination and normal general cortical function    Findings  The thyroid scan shows a heterogeneous appearance to the bilateral thyroid gland, with dominant photopenia in the left upper lobe. There is overall diffusely increased uptake in the thyroid gland.  The STROUD thyroid uptake is performed and measures 41% at 24 hours. IMPRESSION:   Thyroid uptake and scan findings show overall hyperfunctioning gland suggestive of Graves' disease, with gland heterogeneity suggestive of chronic disease versus superimposed on a multinodular goiter. Close follow-up of nodules seen on ultrasound is also recommended. Electronically Signed by: Melanie Peralta MD, 7/31/2021 11:33 AM     Date: 07/31/21   Received From: [facility]:   1.  Enlarged, multinodular thyroid, as discussed above. Follow-up ultrasound in 12 months recommended. Electronically Signed by: Lizeth Betancourt MD, 6/22/2021 6:03 PM    Lab Review:  Lab Results   Component Value Date    TSH 4.657 02/23/2022    TSH 2.490 01/27/2022    TSH 0.042 12/27/2021    TSH <0.01 09/01/2021    TSH <0.01 06/02/2021    TSH 10.71 10/05/2020     Lab Results   Component Value Date    T4FREE 0.51 02/23/2022        ASSESSMENT/PLAN:  1. Hyperthyroidism in the setting of multinodular goiter  TSH in February 2022 with a value of 4.6 with a low free T4  Advised patient to reduce the dose of Tapazole to 5 mg daily   Patient restarted methimazole on September 2, 2021    She was advised that she would need monthly blood work to keep in close eye on her thyroid function test which seems to fluctuate from hypo to hyper on medication. She was also advised to repeat her thyroid ultrasound to document stability in the size of the nodules noted on the previous ultrasound in June.     Patient had a thyroid uptake and scan in July 2021 at [facility]. which showed overall Graves'-like pattern with a uptake of 41%  Thyroid ultrasound was done in June 2021 at [facility] showing multinodular goiter  We will repeat follow-up in 6 months.      ----Isthmus nodule in setting of MNG   In feb 2/28/2022 [facility] no malignant cells identified Isthmus nnodule       ----- Type 2 diabetes mellitus with other specified complication, with long-term current use of insulin   Apparently patient is now diet controlled  Metformin was stopped after her weight loss    3. Mixed hyperlipidemia  Patient is on statins    4. Mild episode of recurrent major depressive disorder ([address]  Stable as per primary care physician    5. Malignant neoplasm of hilus of lung, unspecified laterality [facility])  Patient follows with pulmonology as well as oncology          Reviewed and/or ordered clinical lab results Yes  Reviewed and/or ordered radiology tests Yes   Reviewed and/or ordered other diagnostic tests Yes  Made a decision to obtain old records Yes  Reviewed and summarized old records Yes      Nalini Feng was counseled regarding symptoms of current diagnosis, course and complications of disease if inadequately treated, side effects of medications, diagnosis, treatment options, and prognosis, risks, benefits, complications, and alternatives of treatment, labs, imaging and other studies and treatment targets and goals. She understands instructions and counseling. Return in about 3 months (around 6/10/2022). Please note that some or all of this report was generated using voice recognition software. Please notify me in case of any questions about the content of this document, as some errors in transcription may have occurred .

## 2022-03-22 LAB
CATARACTS: POSITIVE
DIABETIC RETINOPATHY: NEGATIVE
GLAUCOMA: NEGATIVE
INTRAOCULAR PRESSURE EYE: NORMAL
VISUAL ACUITY DISTANCE LEFT EYE: NORMAL
VISUAL ACUITY DISTANCE RIGHT EYE: NORMAL

## 2022-04-08 ENCOUNTER — TELEPHONE (OUTPATIENT)
Dept: INTERNAL MEDICINE CLINIC | Age: 72
End: 2022-04-08

## 2022-04-08 NOTE — TELEPHONE ENCOUNTER
----- Message from [facility] Parkerffield sent at 4/8/2022 10:10 AM EDT -----  Subject: Appointment Request    Reason for Call: Routine Existing Condition Follow Up    QUESTIONS  Type of Appointment? Established Patient  Reason for appointment request? Available appointments did not meet   patient need  Additional Information for Provider? Pt would like to come in to the   office for an appt. Pt also wants Dr to know that she found out she and   her son has Covid.  saw son in office last week and they found out the   had covid 4/4 Pt would like to be seen for Memory issues #mammogram needed  ---------------------------------------------------------------------------  --------------  CALL BACK INFO  What is the best way for the office to contact you? OK to leave message on   voicemail  Preferred Call Back Phone Number? [phone]  ---------------------------------------------------------------------------  --------------  SCRIPT ANSWERS  Relationship to Patient? Self  Have your symptoms changed? No  Is this follow up request related to routine Diabetes Management? No  Have you been diagnosed with, awaiting test results for, or told that you   are suspected of having COVID-19 (Coronavirus)? (If patient has tested   negative or was tested as a requirement for work, school, or travel and   not based on symptoms, answer no)? Yes  Did your symptoms begin within the past 10 days or was your positive test   result within the past 10 days?  Yes

## 2022-04-20 DIAGNOSIS — F41.9 ANXIETY: ICD-10-CM

## 2022-04-20 RX ORDER — ALPRAZOLAM 0.5 MG/1
TABLET ORAL
Qty: 90 TABLET | Refills: 3 | Status: SHIPPED | OUTPATIENT
Start: 2022-04-20 | End: 2022-10-03

## 2022-04-21 ENCOUNTER — TELEPHONE (OUTPATIENT)
Dept: INTERNAL MEDICINE CLINIC | Age: 72
End: 2022-04-21

## 2022-04-21 NOTE — TELEPHONE ENCOUNTER
----- Message from [facility] sent at 4/21/2022  9:56 AM EDT -----  Subject: Appointment Request    Reason for Call: Routine Existing Condition Follow Up (Diabetes)    QUESTIONS  Type of Appointment? Established Patient  Reason for appointment request? No appointments available during search  Additional Information for Provider? The patient needs to reschedule   follow up for A1C. Please call the patient  ---------------------------------------------------------------------------  --------------  CALL BACK INFO  What is the best way for the office to contact you? OK to leave message on   voicemail  Preferred Call Back Phone Number? [phone]  ---------------------------------------------------------------------------  --------------  SCRIPT ANSWERS  Relationship to Patient? Self  Have your symptoms changed? No  Is this follow up request related to routine Diabetes Management? Yes  Have you been diagnosed with, awaiting test results for, or told that you   are suspected of having COVID-19 (Coronavirus)? (If patient has tested   negative or was tested as a requirement for work, school, or travel and   not based on symptoms, answer no)? No  Within the past 10 days have you developed any of the following symptoms   (answer no if symptoms have been present longer than 10 days or began   more than 10 days ago)? Fever or Chills, Cough, Shortness of breath or   difficulty breathing, Loss of taste or smell, Sore throat, Nasal   congestion, Sneezing or runny nose, Fatigue or generalized body aches   (answer no if pain is specific to a body part e.g. back pain), Diarrhea,   Headache? No  Have you had close contact with someone with COVID-19 in the last 7 days? No  (Service Expert  click yes below to proceed with [facility] As Usual   Scheduling)?  Yes

## 2022-04-22 ENCOUNTER — TELEPHONE (OUTPATIENT)
Dept: INTERNAL MEDICINE CLINIC | Age: 72
End: 2022-04-22

## 2022-04-22 LAB
T4 FREE: 0.77 NG/DL (ref 0.61–1.12)
TSH ULTRASENSITIVE: 1.52 UIU/ML (ref 0.45–5.33)

## 2022-04-22 NOTE — TELEPHONE ENCOUNTER
----- Message from [place] [facility] sent at 4/21/2022  9:56 AM EDT -----  Subject: Appointment Request    Reason for Call: Routine Existing Condition Follow Up (Diabetes)    QUESTIONS  Type of Appointment? Established Patient  Reason for appointment request? No appointments available during search  Additional Information for Provider? The patient needs to reschedule   follow up for A1C. Please call the patient  ---------------------------------------------------------------------------  --------------  CALL BACK INFO  What is the best way for the office to contact you? OK to leave message on   voicemail  Preferred Call Back Phone Number? [phone]  ---------------------------------------------------------------------------  --------------  SCRIPT ANSWERS  Relationship to Patient? Self  Have your symptoms changed? No  Is this follow up request related to routine Diabetes Management? Yes  Have you been diagnosed with, awaiting test results for, or told that you   are suspected of having COVID-19 (Coronavirus)? (If patient has tested   negative or was tested as a requirement for work, school, or travel and   not based on symptoms, answer no)? No  Within the past 10 days have you developed any of the following symptoms   (answer no if symptoms have been present longer than 10 days or began   more than 10 days ago)? Fever or Chills, Cough, Shortness of breath or   difficulty breathing, Loss of taste or smell, Sore throat, Nasal   congestion, Sneezing or runny nose, Fatigue or generalized body aches   (answer no if pain is specific to a body part e.g. back pain), Diarrhea,   Headache? No  Have you had close contact with someone with COVID-19 in the last 7 days? No  (Service Expert  click yes below to proceed with [employer] As Usual   Scheduling)?  Yes

## 2022-04-23 LAB — T3 TOTAL: 129 NG/DL (ref 71–180)

## 2022-05-09 RX ORDER — ATORVASTATIN CALCIUM 20 MG/1
20 TABLET, FILM COATED ORAL DAILY
Qty: 90 TABLET | Refills: 1 | Status: SHIPPED | OUTPATIENT
Start: 2022-05-09

## 2022-05-10 ENCOUNTER — OFFICE VISIT (OUTPATIENT)
Dept: PULMONOLOGY | Age: 72
End: 2022-05-10
Payer: MEDICARE

## 2022-05-10 VITALS — HEART RATE: 94 BPM | OXYGEN SATURATION: 95 %

## 2022-05-10 DIAGNOSIS — Z85.118 HISTORY OF LUNG CANCER: ICD-10-CM

## 2022-05-10 DIAGNOSIS — C34.2 MALIGNANT NEOPLASM OF MIDDLE LOBE OF RIGHT LUNG (HCC): ICD-10-CM

## 2022-05-10 DIAGNOSIS — J96.11 CHRONIC HYPOXEMIC RESPIRATORY FAILURE (HCC): ICD-10-CM

## 2022-05-10 DIAGNOSIS — J44.9 COPD, MODERATE (HCC): Primary | ICD-10-CM

## 2022-05-10 DIAGNOSIS — J43.2 CENTRILOBULAR EMPHYSEMA (HCC): ICD-10-CM

## 2022-05-10 PROCEDURE — 1036F TOBACCO NON-USER: CPT | Performed by: INTERNAL MEDICINE

## 2022-05-10 PROCEDURE — G8399 PT W/DXA RESULTS DOCUMENT: HCPCS | Performed by: INTERNAL MEDICINE

## 2022-05-10 PROCEDURE — G8427 DOCREV CUR MEDS BY ELIG CLIN: HCPCS | Performed by: INTERNAL MEDICINE

## 2022-05-10 PROCEDURE — G8420 CALC BMI NORM PARAMETERS: HCPCS | Performed by: INTERNAL MEDICINE

## 2022-05-10 PROCEDURE — 3023F SPIROM DOC REV: CPT | Performed by: INTERNAL MEDICINE

## 2022-05-10 PROCEDURE — 1123F ACP DISCUSS/DSCN MKR DOCD: CPT | Performed by: INTERNAL MEDICINE

## 2022-05-10 PROCEDURE — 99214 OFFICE O/P EST MOD 30 MIN: CPT | Performed by: INTERNAL MEDICINE

## 2022-05-10 PROCEDURE — 4040F PNEUMOC VAC/ADMIN/RCVD: CPT | Performed by: INTERNAL MEDICINE

## 2022-05-10 PROCEDURE — 3017F COLORECTAL CA SCREEN DOC REV: CPT | Performed by: INTERNAL MEDICINE

## 2022-05-10 PROCEDURE — 1090F PRES/ABSN URINE INCON ASSESS: CPT | Performed by: INTERNAL MEDICINE

## 2022-05-10 RX ORDER — PREDNISONE 10 MG/1
TABLET ORAL
Qty: 30 TABLET | Refills: 0 | Status: SHIPPED | OUTPATIENT
Start: 2022-05-10 | End: 2022-05-20

## 2022-05-11 RX ORDER — ATORVASTATIN CALCIUM 20 MG/1
TABLET, FILM COATED ORAL
Qty: 90 TABLET | Refills: 1 | OUTPATIENT
Start: 2022-05-11

## 2022-05-20 ENCOUNTER — OFFICE VISIT (OUTPATIENT)
Dept: INTERNAL MEDICINE CLINIC | Age: 72
End: 2022-05-20
Payer: MEDICARE

## 2022-05-20 VITALS
WEIGHT: 124 LBS | HEART RATE: 109 BPM | DIASTOLIC BLOOD PRESSURE: 80 MMHG | TEMPERATURE: 97.5 F | OXYGEN SATURATION: 96 % | BODY MASS INDEX: 24.22 KG/M2 | SYSTOLIC BLOOD PRESSURE: 134 MMHG

## 2022-05-20 DIAGNOSIS — I15.2 HYPERTENSION ASSOCIATED WITH DIABETES (HCC): Primary | ICD-10-CM

## 2022-05-20 DIAGNOSIS — R06.02 SHORTNESS OF BREATH: ICD-10-CM

## 2022-05-20 DIAGNOSIS — F33.0 MILD EPISODE OF RECURRENT MAJOR DEPRESSIVE DISORDER (HCC): ICD-10-CM

## 2022-05-20 DIAGNOSIS — E11.69 HYPERLIPIDEMIA DUE TO TYPE 2 DIABETES MELLITUS (HCC): ICD-10-CM

## 2022-05-20 DIAGNOSIS — E78.5 HYPERLIPIDEMIA DUE TO TYPE 2 DIABETES MELLITUS (HCC): ICD-10-CM

## 2022-05-20 DIAGNOSIS — R00.0 TACHYCARDIA: ICD-10-CM

## 2022-05-20 DIAGNOSIS — E11.59 HYPERTENSION ASSOCIATED WITH DIABETES (HCC): Primary | ICD-10-CM

## 2022-05-20 DIAGNOSIS — R41.3 MEMORY LOSS: ICD-10-CM

## 2022-05-20 PROCEDURE — 3288F FALL RISK ASSESSMENT DOCD: CPT | Performed by: INTERNAL MEDICINE

## 2022-05-20 PROCEDURE — 3017F COLORECTAL CA SCREEN DOC REV: CPT | Performed by: INTERNAL MEDICINE

## 2022-05-20 PROCEDURE — G8427 DOCREV CUR MEDS BY ELIG CLIN: HCPCS | Performed by: INTERNAL MEDICINE

## 2022-05-20 PROCEDURE — 99214 OFFICE O/P EST MOD 30 MIN: CPT | Performed by: INTERNAL MEDICINE

## 2022-05-20 PROCEDURE — G8399 PT W/DXA RESULTS DOCUMENT: HCPCS | Performed by: INTERNAL MEDICINE

## 2022-05-20 PROCEDURE — 1123F ACP DISCUSS/DSCN MKR DOCD: CPT | Performed by: INTERNAL MEDICINE

## 2022-05-20 PROCEDURE — 1090F PRES/ABSN URINE INCON ASSESS: CPT | Performed by: INTERNAL MEDICINE

## 2022-05-20 PROCEDURE — 3046F HEMOGLOBIN A1C LEVEL >9.0%: CPT | Performed by: INTERNAL MEDICINE

## 2022-05-20 PROCEDURE — 1036F TOBACCO NON-USER: CPT | Performed by: INTERNAL MEDICINE

## 2022-05-20 PROCEDURE — 2022F DILAT RTA XM EVC RTNOPTHY: CPT | Performed by: INTERNAL MEDICINE

## 2022-05-20 PROCEDURE — G8420 CALC BMI NORM PARAMETERS: HCPCS | Performed by: INTERNAL MEDICINE

## 2022-05-20 RX ORDER — BLOOD SUGAR DIAGNOSTIC
1 STRIP MISCELLANEOUS DAILY
Qty: 100 EACH | Refills: 3 | Status: SHIPPED | OUTPATIENT
Start: 2022-05-20

## 2022-05-20 RX ORDER — BLOOD-GLUCOSE METER
1 EACH MISCELLANEOUS DAILY
Qty: 1 KIT | Refills: 0 | Status: SHIPPED | OUTPATIENT
Start: 2022-05-20

## 2022-05-20 ASSESSMENT — PATIENT HEALTH QUESTIONNAIRE - PHQ9
SUM OF ALL RESPONSES TO PHQ9 QUESTIONS 1 & 2: 0
10. IF YOU CHECKED OFF ANY PROBLEMS, HOW DIFFICULT HAVE THESE PROBLEMS MADE IT FOR YOU TO DO YOUR WORK, TAKE CARE OF THINGS AT HOME, OR GET ALONG WITH OTHER PEOPLE: 0
3. TROUBLE FALLING OR STAYING ASLEEP: 0
SUM OF ALL RESPONSES TO PHQ QUESTIONS 1-9: 1
SUM OF ALL RESPONSES TO PHQ QUESTIONS 1-9: 0
SUM OF ALL RESPONSES TO PHQ QUESTIONS 1-9: 1
SUM OF ALL RESPONSES TO PHQ9 QUESTIONS 1 & 2: 0
1. LITTLE INTEREST OR PLEASURE IN DOING THINGS: 0
2. FEELING DOWN, DEPRESSED OR HOPELESS: 0
SUM OF ALL RESPONSES TO PHQ QUESTIONS 1-9: 1
SUM OF ALL RESPONSES TO PHQ QUESTIONS 1-9: 1
9. THOUGHTS THAT YOU WOULD BE BETTER OFF DEAD, OR OF HURTING YOURSELF: 0
8. MOVING OR SPEAKING SO SLOWLY THAT OTHER PEOPLE COULD HAVE NOTICED. OR THE OPPOSITE, BEING SO FIGETY OR RESTLESS THAT YOU HAVE BEEN MOVING AROUND A LOT MORE THAN USUAL: 0
5. POOR APPETITE OR OVEREATING: 0
7. TROUBLE CONCENTRATING ON THINGS, SUCH AS READING THE NEWSPAPER OR WATCHING TELEVISION: 0
6. FEELING BAD ABOUT YOURSELF - OR THAT YOU ARE A FAILURE OR HAVE LET YOURSELF OR YOUR FAMILY DOWN: 0
SUM OF ALL RESPONSES TO PHQ QUESTIONS 1-9: 0
2. FEELING DOWN, DEPRESSED OR HOPELESS: 0
SUM OF ALL RESPONSES TO PHQ QUESTIONS 1-9: 0
1. LITTLE INTEREST OR PLEASURE IN DOING THINGS: 0
SUM OF ALL RESPONSES TO PHQ QUESTIONS 1-9: 0
4. FEELING TIRED OR HAVING LITTLE ENERGY: 1

## 2022-05-20 NOTE — PROGRESS NOTES
Chief Complaint   Patient presents with    Memory Loss       Assessment/Plan:  Merry Cr was seen today for memory loss. Diagnoses and all orders for this visit:    Hypertension associated with diabetes ([address]  -     Blood Glucose Monitoring Suppl (ACCU-CHEK IRA PLUS) w/Device KIT; 1 each by Does not apply route daily  -     Comprehensive Metabolic Panel; Future  -     Hemoglobin A1C; Future  -     Microalbumin / Creatinine Urine Ratio; Future  -     HM DIABETES FOOT EXAM    Hyperlipidemia due to type 2 diabetes mellitus (HCC)  -     blood glucose test strips (ACCU-CHEK IRA PLUS) strip; 1 each by In Vitro route daily As needed. -     Comprehensive Metabolic Panel; Future  -     Hemoglobin A1C; Future  -     Microalbumin / Creatinine Urine Ratio; Future  -     HM DIABETES FOOT EXAM    Mild episode of recurrent major depressive disorder (HCC)    Memory loss  -     MRI BRAIN WO CONTRAST; Future    Shortness of breath  -     ECHO Complete 2D W Doppler W Color; Future    Tachycardia        Vitals:    05/20/22 1325   BP: 134/80   Pulse: 109   Temp: 97.5 °F (36.4 °C)   SpO2: 96%     Physical Exam  Vitals and nursing note reviewed. Constitutional:       General: She is not in acute distress. Appearance: She is well-developed. She is not diaphoretic. HENT:      Head: Normocephalic and atraumatic. Cardiovascular:      Rate and Rhythm: Normal rate and regular rhythm. Heart sounds: Normal heart sounds. No murmur heard. No friction rub. No gallop. Pulmonary:      Effort: Pulmonary effort is normal. No respiratory distress. Breath sounds: Normal breath sounds. No wheezing or rales. Chest:      Chest wall: No tenderness. Musculoskeletal:      Comments: No calluses or open lesions   Skin:     General: Skin is warm. Findings: No erythema or rash. Neurological:      Mental Status: She is alert and oriented to person, place, and time. Cranial Nerves: No cranial nerve deficit. Comments: Normal sensation to microfilament bilaterally. Psychiatric:         Behavior: Behavior normal.         Thought Content:  Thought content normal.         Judgment: Judgment normal.         PHQ Scores 5/20/2022 5/20/2022 9/1/2021 4/5/2021 10/5/2020 10/30/2019 6/8/2018   PHQ2 Score 0 0 2 4 6 5 0   PHQ9 Score 1 0 2 19 18 17 0     Interpretation of Total Score Depression Severity: 1-4 = Minimal depression, 5-9 = Mild depression, 10-14 = Moderate depression, 15-19 = Moderately severe depression, 20-27 = Severe depression    Celia Glad, MD [facility]

## 2022-05-20 NOTE — PROGRESS NOTES
Chief Complaint   Patient presents with    Memory Loss       Assessment/Plan:  Lorene Gonzalez was seen today for memory loss. Diagnoses and all orders for this visit:    Hypertension associated with diabetes ([facility]  -     Blood Glucose Monitoring Suppl (ACCU-CHEK IRA PLUS) w/Device KIT; 1 each by Does not apply route daily  -     Comprehensive Metabolic Panel; Future  -     Hemoglobin A1C; Future  -     Microalbumin / Creatinine Urine Ratio; Future    Hyperlipidemia due to type 2 diabetes mellitus ([facility]  -     blood glucose test strips (ACCU-CHEK IRA PLUS) strip; 1 each by In Vitro route daily As needed. -     Comprehensive Metabolic Panel; Future  -     Hemoglobin A1C; Future  -     Microalbumin / Creatinine Urine Ratio; Future    Mild episode of recurrent major depressive disorder ([facility]        Vitals:    05/20/22 1325   BP: 134/80   Pulse: 109   Temp: 97.5 °F (36.4 °C)   SpO2: 96%     Physical Exam  Vitals reviewed. Constitutional:       General: She is not in acute distress. Appearance: She is well-developed. She is not diaphoretic. HENT:      Head: Normocephalic and atraumatic. Pulmonary:      Effort: Pulmonary effort is normal.   Neurological:      Mental Status: She is alert and oriented to person, place, and time. Cranial Nerves: No cranial nerve deficit. Psychiatric:         Behavior: Behavior normal.         Thought Content:  Thought content normal.         Judgment: Judgment normal.         PHQ Scores 5/20/2022 5/20/2022 9/1/2021 4/5/2021 10/5/2020 10/30/2019 6/8/2018   PHQ2 Score 0 0 2 4 6 5 0   PHQ9 Score 1 0 2 19 18 17 0     Interpretation of Total Score Depression Severity: 1-4 = Minimal depression, 5-9 = Mild depression, 10-14 = Moderate depression, 15-19 = Moderately severe depression, 20-27 = Severe depression    Celia Glad, MD [facility]

## 2022-05-23 RX ORDER — ALBUTEROL SULFATE 90 UG/1
2 AEROSOL, METERED RESPIRATORY (INHALATION) EVERY 6 HOURS PRN
Qty: 6.7 G | Refills: 5 | Status: SHIPPED | OUTPATIENT
Start: 2022-05-23 | End: 2023-05-23

## 2022-05-26 LAB
A/G RATIO: 1.3 (ref 1–2)
ALBUMIN SERPL-MCNC: 4.1 G/DL (ref 3.5–5.7)
ALBUMIN/PROTEIN TOTAL, SER/PL: 7.3 G/DL (ref 6–8.3)
ALP BLD-CCNC: 142 U/L (ref 34–104)
ALT SERPL-CCNC: 18 U/L (ref 7–52)
ANION GAP 4: 7 MMOL/L (ref 7–16)
AST W/O P-5'-P: 14 U/L (ref 13–39)
BILIRUB SERPL-MCNC: 0.6 MG/DL (ref 0.3–1)
BUN BLDV-MCNC: 13 MG/DL (ref 7–25)
CALCIUM SERPL-MCNC: 13.4 MG/DL (ref 8.6–10.2)
CHLORIDE BLD-SCNC: 95 MMOL/L (ref 98–107)
CO2: 35 MMOL/L (ref 21–31)
CREATININE + EGFR PANEL: 1 MG/DL (ref 0.6–1.2)
CREATININE URINE: 54.6 MG/DL
GFR CALCULATED: 54 ML/MIN/1.73M2
GFR CALCULATED: > 60 ML/MIN/1.73M2
GLOBULIN: 3.2 G/DL (ref 2.6–4.2)
GLUCOSE: 270 MG/DL (ref 74–109)
MICROALBUMIN UR-MCNC: 7.5 MG/L
MICROALBUMIN/CREAT UR-RTO: 13.7 MG ALB/G CREAT
POTASSIUM SERPL-SCNC: 4.4 MMOL/L (ref 3.5–5.3)
SODIUM BLD-SCNC: 137 MMOL/L (ref 136–145)

## 2022-05-27 ENCOUNTER — HOSPITAL ENCOUNTER (OUTPATIENT)
Dept: MRI IMAGING | Age: 72
Discharge: HOME | End: 2022-05-27
Payer: MEDICARE

## 2022-05-27 DIAGNOSIS — R41.3 MEMORY LOSS: ICD-10-CM

## 2022-05-27 LAB
DEPRECATED MEAN BLOOD GLUCOSE: 212 MG/DL (ref 68–126)
HBA1C MFR BLD: 9 % (ref 4–6)

## 2022-05-27 PROCEDURE — 70551 MRI BRAIN STEM W/O DYE: CPT

## 2022-05-27 RX ORDER — INSULIN DEGLUDEC 200 U/ML
15 INJECTION, SOLUTION SUBCUTANEOUS DAILY
Qty: 3 PEN | Refills: 1 | Status: SHIPPED | OUTPATIENT
Start: 2022-05-27 | End: 2022-09-14

## 2022-06-27 LAB
T4 FREE: 0.65 NG/DL (ref 0.61–1.12)
TSH ULTRASENSITIVE: 0.92 UIU/ML (ref 0.45–5.33)

## 2022-06-29 LAB — T3 TOTAL: 91 NG/DL (ref 71–180)

## 2022-07-28 DIAGNOSIS — E11.69 HYPERLIPIDEMIA DUE TO TYPE 2 DIABETES MELLITUS (HCC): Primary | ICD-10-CM

## 2022-07-28 DIAGNOSIS — E78.5 HYPERLIPIDEMIA DUE TO TYPE 2 DIABETES MELLITUS (HCC): Primary | ICD-10-CM

## 2022-08-18 DIAGNOSIS — F33.0 MAJOR DEPRESSIVE DISORDER, RECURRENT EPISODE, MILD (HCC): Chronic | ICD-10-CM

## 2022-08-18 RX ORDER — BUPROPION HYDROCHLORIDE 150 MG/1
TABLET, EXTENDED RELEASE ORAL
Qty: 180 TABLET | Refills: 1 | Status: SHIPPED | OUTPATIENT
Start: 2022-08-18

## 2022-08-19 LAB
A/G RATIO: 1.3 (ref 1–2)
ALBUMIN SERPL-MCNC: 4.3 G/DL (ref 3.5–5.7)
ALBUMIN/PROTEIN TOTAL, SER/PL: 7.6 G/DL (ref 6–8.3)
ALP BLD-CCNC: 121 U/L (ref 34–104)
ALT SERPL-CCNC: 18 U/L (ref 7–52)
ANION GAP 4: 6 MMOL/L (ref 7–16)
AST W/O P-5'-P: 18 U/L (ref 13–39)
BILIRUB SERPL-MCNC: 0.6 MG/DL (ref 0.3–1)
BUN BLDV-MCNC: 15 MG/DL (ref 7–25)
CALCIUM SERPL-MCNC: 10 MG/DL (ref 8.6–10.2)
CHLORIDE BLD-SCNC: 102 MMOL/L (ref 98–107)
CO2: 31 MMOL/L (ref 21–31)
CREATININE + EGFR PANEL: 0.9 MG/DL (ref 0.6–1.2)
CREATININE URINE: 117.61 MG/DL
GFR CALCULATED: > 60 ML/MIN/1.73M2
GFR CALCULATED: > 60 ML/MIN/1.73M2
GLOBULIN: 3.3 G/DL (ref 2.6–4.2)
GLUCOSE: 108 MG/DL (ref 74–109)
MICROALBUMIN UR-MCNC: 20.4 MG/L
MICROALBUMIN/CREAT UR-RTO: 17.3 MG ALB/G CREAT
POTASSIUM SERPL-SCNC: 4.1 MMOL/L (ref 3.5–5.3)
SODIUM BLD-SCNC: 139 MMOL/L (ref 136–145)

## 2022-08-20 LAB
DEPRECATED MEAN BLOOD GLUCOSE: 192 MG/DL (ref 68–126)
HBA1C MFR BLD: 8.3 % (ref 4–6)

## 2022-09-02 ENCOUNTER — TELEPHONE (OUTPATIENT)
Dept: PHARMACY | Facility: CLINIC | Age: 72
End: 2022-09-02

## 2022-09-02 NOTE — TELEPHONE ENCOUNTER
[facility] CLINICAL PHARMACY: ADHERENCE REVIEW  Identified care gap per [employer]: fills at [facility]: Statin adherence    Last Visit: 05.20.22        [address] Records claims through 08.22.22 (Prior Year Maxx Mirza = FAILED; YTD Maxx Mirza =  79%; Potential Fail Date: 09.14.22 ):   Atorvastatin last filled on 05.06.22 for 90 day supply. Next refill due: 08.04.22    Per  [facility] Portal:  (same as above). Lab Results   Component Value Date    CHOL 185 03/11/2020    TRIG 240 (H) 03/11/2020    HDL 47 03/11/2020    LDLCALC 90 03/11/2020    LDLDIRECT 78 08/23/2017     ALT   Date Value Ref Range Status   08/19/2022 18 7 - 52 U/L Final     Comment:     Due to new chemistry methodology at some [facility] labs, please re-baseline this test for any   patients being transferred from a different location. AST   Date Value Ref Range Status   08/19/2022 18 13 - 39 U/L Final     The 10-year ASCVD risk score (Nancy Elam, et al., 2013) is: 25.5%    Values used to calculate the score:      Age: 67 years      Sex: Female      Is Non- : No      Diabetic: Yes      Tobacco smoker: No      Systolic Blood Pressure: 454 mmHg      Is BP treated: No      HDL Cholesterol: 47 mg/dL      Total Cholesterol: 185 mg/dL     PLAN  The following are interventions that have been identified:   - Patient overdue refilling Atorvastatin and active on home medication list.     Attempting to reach patient to review. Left message asking for return call.         Future Appointments   Date Time Provider Cherelle Washburn   9/14/2022 10:00 AM Vi Mehta MD FF ENDO [facility]   9/19/2022 10:15 AM Reggie Dancer, MD F P[facility] [place] - [facility]   11/8/2022  3:00 PM Rita Martines MD PULM & CC Higinio Sandoval  Specialist  [address] [place] [address],[address] Floor Clinical Pharmacy  Phone: toll free [phone]

## 2022-09-14 ENCOUNTER — OFFICE VISIT (OUTPATIENT)
Dept: ENDOCRINOLOGY | Age: 72
End: 2022-09-14
Payer: COMMERCIAL

## 2022-09-14 ENCOUNTER — TELEPHONE (OUTPATIENT)
Dept: ENDOCRINOLOGY | Age: 72
End: 2022-09-14

## 2022-09-14 VITALS
WEIGHT: 129 LBS | HEART RATE: 70 BPM | DIASTOLIC BLOOD PRESSURE: 77 MMHG | BODY MASS INDEX: 25.32 KG/M2 | HEIGHT: 60 IN | RESPIRATION RATE: 16 BRPM | SYSTOLIC BLOOD PRESSURE: 158 MMHG

## 2022-09-14 DIAGNOSIS — I15.2 HYPERTENSION ASSOCIATED WITH DIABETES (HCC): ICD-10-CM

## 2022-09-14 DIAGNOSIS — E78.5 HYPERLIPIDEMIA DUE TO TYPE 2 DIABETES MELLITUS (HCC): ICD-10-CM

## 2022-09-14 DIAGNOSIS — E11.59 HYPERTENSION ASSOCIATED WITH DIABETES (HCC): ICD-10-CM

## 2022-09-14 DIAGNOSIS — E05.90 HYPERTHYROIDISM: Primary | ICD-10-CM

## 2022-09-14 DIAGNOSIS — J44.9 COPD, MODERATE (HCC): ICD-10-CM

## 2022-09-14 DIAGNOSIS — E11.69 HYPERLIPIDEMIA DUE TO TYPE 2 DIABETES MELLITUS (HCC): ICD-10-CM

## 2022-09-14 PROCEDURE — 1036F TOBACCO NON-USER: CPT | Performed by: INTERNAL MEDICINE

## 2022-09-14 PROCEDURE — 99214 OFFICE O/P EST MOD 30 MIN: CPT | Performed by: INTERNAL MEDICINE

## 2022-09-14 PROCEDURE — 2022F DILAT RTA XM EVC RTNOPTHY: CPT | Performed by: INTERNAL MEDICINE

## 2022-09-14 PROCEDURE — G8427 DOCREV CUR MEDS BY ELIG CLIN: HCPCS | Performed by: INTERNAL MEDICINE

## 2022-09-14 PROCEDURE — 3023F SPIROM DOC REV: CPT | Performed by: INTERNAL MEDICINE

## 2022-09-14 PROCEDURE — 3052F HG A1C>EQUAL 8.0%<EQUAL 9.0%: CPT | Performed by: INTERNAL MEDICINE

## 2022-09-14 PROCEDURE — 1090F PRES/ABSN URINE INCON ASSESS: CPT | Performed by: INTERNAL MEDICINE

## 2022-09-14 PROCEDURE — 3017F COLORECTAL CA SCREEN DOC REV: CPT | Performed by: INTERNAL MEDICINE

## 2022-09-14 PROCEDURE — G8417 CALC BMI ABV UP PARAM F/U: HCPCS | Performed by: INTERNAL MEDICINE

## 2022-09-14 PROCEDURE — 1123F ACP DISCUSS/DSCN MKR DOCD: CPT | Performed by: INTERNAL MEDICINE

## 2022-09-14 PROCEDURE — G8399 PT W/DXA RESULTS DOCUMENT: HCPCS | Performed by: INTERNAL MEDICINE

## 2022-09-14 RX ORDER — ALENDRONATE SODIUM 70 MG/1
70 TABLET ORAL
Qty: 4 TABLET | Refills: 3 | Status: SHIPPED | OUTPATIENT
Start: 2022-09-14 | End: 2022-09-14

## 2022-09-14 NOTE — PROGRESS NOTES
SUBJECTIVE:  Chencho Cagle is a 67 y.o. female who is referred here for longstanding history of remitting and relapsing hyerthyroidism. Patient was diagnosed with Hyperthyroidism approximately at age 57. On review of chart it appears that patient had suppressed TSH since 2013 and was on PTU/Tapazole which was stopped after a TSH of 77 in May 2017 but she would eventually become hyperthyroid after stopping the medication. It appears that she had been off and on on PTU/Tapazole for all these years  Previously had a thyroid uptake and scan with a 51% uptake with no hot or cold nodules identified in June 2016. Patient was treated with propylthiouracil 3 times a day. Patient stayed euthyroid for a few years and then in May 2021 she started experiencing weight loss, heat intolerance and palpitations. Symptoms at presentation were weight loss since May 2021. current complaints: palpitations, sweating, weight loss, diarrhea, change in skin,  nails, or hair  History of obstructive symptoms: difficulty swallowing No, changes in voice/hoarseness No.  History of radiation to patient's neck: NO  Recent iodine exposure: No  Family history includes no thyroid abnormalities. Family history of thyroid cancer: No    She has  diet controlled diabetes. She has Hyperlipidemia and hypertension. Patient has a history of lung carcinoma she is status post right lobectomy in June 2015 and follows with oncology she was treated with cisplatin  Patient had Covid pneumonia in October 2020. Patient had a DEXA scan done at [address] [place] [address] in August 2021 which showed a T score of 1.4 in lumbar spine, left hip was -1.7, right femoral neck was -1.7. Patient denies any history of fractures  Menopause was approximately around age 49 previously she had hysterectomy at age 21. Her mom has osteoporosis. She is not on any steroids    INTERIM  She has been taking 5 mg of Tapazole daily. Denies any fracture.   Patient notes improvement in her palpitations but continues to have some palpitation. She underwent a CT scan of chest yesterday which showed thyroid enlargement and no particular nodules. Past Medical History:   Diagnosis Date    Antineoplastic chemotherapy induced anemia 11/12/2015    Anxiety     Benign essential hypertension     Cervical radiculitis 11/3/2017    Cervicalgia 11/3/2017    Chemotherapy induced nausea and vomiting 9/24/2015    Chronic fatigue syndrome     Chronic obstructive pulmonary disease (COPD) (HCC)     Contusion of left foot 3/8/2017    COPD, moderate (HCC) 6/1/2016    INTERPRETATION: Spirometry showed decreased FVC of 1.86 L, 77% predicted and decreased  FEV-1 of 1.16 L, 60% predicted. FEV-1/FVC ratio was decreased at 63%. No response to bronchodilators demonstrated on spirometry. Lung volumes showed increased total lung capacity of 141% predicted and residual volume of 241% predicted. Diffusion capacity showed  decreased DLCO of 66% predicted.   IMPRESSION: Mod    Diabetes mellitus type II, controlled ([address]     Encounter for chemotherapy management 10/8/2015    Hearing disorder, sensorineural 2/23/2017    Hyperlipidemia     Hyperthyroidism     Iron deficiency anemia     Left foot pain 2/22/2017    Lung cancer ([address]     Lung mass     R lung mass    Malignant neoplasm of upper lobe of right lung ([address] 5/5/2015    Myofascial pain on right side 10/1/2015    Neoplasm related pain     Primary osteoarthritis of right hip 3/8/2017    Rotator cuff tendinitis 11/3/2017    Tinnitus 2/23/2017    Tobacco user     quit smoking in jan, 2015    Uncontrolled type 2 diabetes mellitus without complication, without long-term current use of insulin 3/11/2016     Patient Active Problem List    Diagnosis Date Noted    Centrilobular emphysema ([address] 10/30/2020    Chronic hypoxemic respiratory failure ([address] 10/30/2020    COVID-19 10/10/2020    Pneumonia due to COVID-19 virus     History of lung cancer     Ex-smoker Metabolic encephalopathy     Mixed hyperlipidemia     Acute respiratory failure ([facility] 10/09/2020    Hyperlipidemia due to type 2 diabetes mellitus ([facility] 2019    Lung cancer ([facility]     Carpal tunnel syndrome of right wrist 2018    COPD, moderate ([facility] 2016    Major depression, recurrent ([facility] 2015    Hypertension associated with diabetes ([facility]     Hyperthyroidism      Past Surgical History:   Procedure Laterality Date    BLADDER REPAIR  1972    tuck    HYSTERECTOMY (CERVIX STATUS UNKNOWN)  1972    THORACOTOMY Right 2015    Dr. Komal Rodriguez - via VATS w/RUL for adenocarcinoma    TUNNELED VENOUS PORT PLACEMENT Left 7/17/15    Dr. Komal Rodriguez  Christen Juliana power injectable port - 8F (max 300psi; MRI conditional 3-Bhakti)     Family History   Problem Relation Age of Onset    Diabetes Father         & TB history    Hypertension Father     Diabetes Sister     Diabetes Brother     Stroke Son     Osteoporosis Mother         & Angina    Anesth Problems Neg Hx     Malig Hyperten Neg Hx     Hypotension Neg Hx     Malig Hypertherm Neg Hx     Pseudochol.  Deficiency Neg Hx      Social History     Socioeconomic History    Marital status:      Spouse name: None    Number of children: None    Years of education: None    Highest education level: None   Tobacco Use    Smoking status: Former     Packs/day: 1.50     Years: 35.00     Pack years: 52.50     Types: Cigarettes     Quit date: 2015     Years since quittin.7    Smokeless tobacco: Never   Vaping Use    Vaping Use: Some days    Substances: Nicotine   Substance and Sexual Activity    Alcohol use: No     Alcohol/week: 0.0 standard drinks    Drug use: Never     Current Outpatient Medications   Medication Sig Dispense Refill    buPROPion (WELLBUTRIN SR) 150 MG extended release tablet TAKE 1 TABLET BY MOUTH TWICE DAILY 180 tablet 1    albuterol sulfate  (90 Base) MCG/ACT inhaler INHALE 2 PUFFS INTO THE LUNGS EVERY 6 HOURS AS NEEDED FOR WHEEZING 6.7 g 5 blood glucose test strips (ACCU-CHEK IRA PLUS) strip 1 each by In Vitro route daily As needed. 100 each 3    Blood Glucose Monitoring Suppl (ACCU-CHEK IRA PLUS) w/Device KIT 1 each by Does not apply route daily 1 kit 0    atorvastatin (LIPITOR) 20 MG tablet Take 1 tablet by mouth daily 90 tablet 1    methIMAzole (TAPAZOLE) 5 MG tablet TAKE 1 TABLET(5 MG) BY MOUTH THREE TIMES DAILY (Patient taking differently: TAKE 1 TABLET(5 MG) BY MOUTH DAILY) 270 tablet 3    melatonin 3 MG TABS tablet Take 3 mg by mouth nightly as needed      oxyCODONE (ROXICODONE) 5 MG immediate release tablet Take 5 mg by mouth every 4 hours as needed for Pain (Oxycodone 5 mg Immediately Q 4-6 Hours PRN). Takes for cancer pain (phantom pain) where right rib lobectomy pain      aspirin 81 MG chewable tablet Take 81 mg by mouth daily Indications: OTC       No current facility-administered medications for this visit. Allergies   Allergen Reactions    Codeine Anxiety         Review of Systems:  I have reviewed the review of system questionnaire filled by the patient .   Patient was advised to contact PCP for non endocrine signs and symptoms       OBJECTIVE:   BP (!) 158/77   Pulse 70   Resp 16   Ht 5' (1.524 m)   Wt 129 lb (58.5 kg)   BMI 25.19 kg/m²   Wt Readings from Last 3 Encounters:   09/14/22 129 lb (58.5 kg)   05/27/22 124 lb (56.2 kg)   05/20/22 124 lb (56.2 kg)       Physical Exam:    Constitutional: no acute distress, well appearing and well nourished  Psychiatric: oriented to person, place and time, judgement and insight and normal, recent and remote memory intact and mood and affect are normal  Skin: skin and subcutaneous tissue is normal without visible mass,   Head and Face: visual inspection  of head and face revealed no abnormalities  Eyes: visual inspection showed no lid or conjunctival swelling, erythema or discharge, pupils are normal, equal, round  Ears/Nose: external inspection of ears and nose revealed no abnormalities, hearing is grossly normal  Oropharynx/Mouth/Face: lips, tongue and gums appear  normal with no lesions, the voice quality was normal  Neck: neck appears symmetric, with no visible masses,   Pulmonary: no increased work of breathing or signs of respiratory distress,  Musculoskeletal: normal on inspection    Neurological: normal coordination and normal general cortical function    Findings  The thyroid scan shows a heterogeneous appearance to the bilateral thyroid gland, with dominant photopenia in the left upper lobe. There is overall diffusely increased uptake in the thyroid gland. The STROUD thyroid uptake is performed and measures 41% at 24 hours. IMPRESSION:   Thyroid uptake and scan findings show overall hyperfunctioning gland suggestive of Graves' disease, with gland heterogeneity suggestive of chronic disease versus superimposed on a multinodular goiter. Close follow-up of nodules seen on ultrasound is also recommended. Electronically Signed by: Abena Lorenzo MD, 7/31/2021 11:33 AM     Date: 07/31/21   Received From: [facility]:   1. Enlarged, multinodular thyroid, as discussed above. Follow-up ultrasound in 12 months recommended. Electronically Signed by: Huey Gaucher, MD, 6/22/2021 6:03 PM    Remington Lopez DO - 08/19/2022   Formatting of this note might be different from the original.   EXAMINATION: US-THYROID / NECK     DATE OF EXAM:  8/19/2022 4:06 PM     DEMOGRAPHICS: 67years old Female     INDICATION:     E04.9: Nontoxic goiter, unspecified     Reason for Exam:  Nontoxic goiter, unspecified. COMPARISON: 20/2/2022     TECHNIQUE: Sonographic evaluation of the thyroid was performed.      FINDINGS:   Thyroid Size:   *  Right lobe: 5.61 cm x 2.26 cm x 2.26 cm   *  Left lobe: 5.25 cm x 2.42 cm x 2.39 cm   *  Isthmus: 0.8 cm   Texture: Heterogenous   Vascularity: Hypervascular   Total number of nodules of any character: > 10     Nodule #: 1 Size (3 dimensions): 1.1 x 1.1 x 0.6 cm   Location: right inferior   Composition: mixed cystic and solid (1)   Echogenicity: isoechoic (1)   Shape: not taller-than-wide (0)   Margins: smooth (0)   Echogenic foci: none (0)   Total points and TR risk for above nodule: 2 points. ACR TI-RADS 2. Not suspicious. Nodule #: 2   Size (3 dimensions): 1.5 x 0.7 x 0.7 cm   Location: left mid   Composition: solid/almost completely solid (2)   Echogenicity: isoechoic (1)   Shape: not taller-than-wide (0)   Margins: ill-defined (0)   Echogenic foci: none (0)   Total points and TR risk for above nodule: 3 points. ACR TI-RADS 3. Mildly suspicious. Nodule #: 3   Size (3 dimensions): 1.6 x 1.1 x 1.2 cm   Location: left mid   Composition: solid/almost completely solid (2)   Echogenicity: isoechoic (1)   Shape: not taller-than-wide (0)   Margins: ill-defined (0)   Echogenic foci: none (0)   Total points and TR risk for above nodule: 3 points. ACR TI-RADS 3. Mildly suspicious. Nodule #: 4   Size (3 dimensions): 1.3 x 1.2 x 0.8 cm   Location: isthmus mid   Composition: solid/almost completely solid (2)   Echogenicity: isoechoic (1)   Shape: not taller-than-wide (0)   Margins: smooth (0)   Echogenic foci: none (0)   Total points and TR risk for above nodule: 3 points. ACR TI-RADS 3. Mildly suspicious. Additional findings: Any additional thyroid nodules visualized but not described have benign characteristics or do not meet ACR criteria for followup. IMPRESSION:   1. ACR TI-RADS risk category: TR3 (3 points), mildly suspicious,  for the left nodule with maximum dimension of 1.6 cm. Recommendation: Followup ultrasound at 1, 3, and 5 years from the initial study.        Lab Review:  Lab Results   Component Value Date/Time    TSH 1.046 09/10/2022 10:13 AM    TSH 0.922 06/27/2022 04:15 PM    TSH 1.525 04/22/2022 10:02 AM    TSH <0.01 09/01/2021 04:45 PM    TSH <0.01 06/02/2021 01:52 PM    TSH 10.71 10/05/2020 11:08 AM     Lab Results   Component Value Date/Time    T4FREE 0.85 09/10/2022 10:13 AM        ASSESSMENT/PLAN:    1. Hyperthyroidism in the setting of multinodular goiter    TSH 1.046 in sept 2022  Stay on Tapazole to 5 mg daily   Patient restarted methimazole on September 2, 2021      Patient had a thyroid uptake and scan in July 2021 at [facility]. which showed overall Graves'-like pattern with a uptake of 41%  Thyroid ultrasound was done in June 2021 at [facility] showing multinodular goiter        ----Isthmus nodule in setting of MNG   In feb 2/28/2022 FNAB no malignant cells identified Isthmus nnodule . Patient had thyroid ultrasound done in August 2022 at [facility] which showed stable sizes of the nodule. Recommendation was to repeat thyroid ultrasound in 1 year    OSTEOPENIA   Worst T score was -1.7 in femoral neck. FRAX scoring major osteoporotic fracture was 9% hip fracture was 1.9%  Menopause --hysterectomy 20 ---age 49 years when she had menopausal symptoms. Mom has OP .   We discussed therapy for osteoporosis if bone mineral density worsens with her next evaluation.      ----- Type 2 diabetes mellitus with other specified complication, with long-term current use of insulin   Apparently patient is now diet controlled  Metformin was stopped after her weight loss    ---- Mixed hyperlipidemia  Patient is on statins    ----Mild episode of recurrent major depressive disorder ([facility]  Stable as per primary care physician    ---  Malignant neoplasm of hilus of lung, unspecified laterality [facility])  Patient follows with pulmonology as well as oncology    Reviewed and/or ordered clinical lab results Yes  Reviewed and/or ordered radiology tests Yes   Reviewed and/or ordered other diagnostic tests Yes  Made a decision to obtain old records Yes  Reviewed and summarized old records Yes      Twin Frazier was counseled regarding symptoms of current diagnosis, course and complications of disease if inadequately treated, side effects of medications, diagnosis, treatment options, and prognosis, risks, benefits, complications, and alternatives of treatment, labs, imaging and other studies and treatment targets and goals. She understands instructions and counseling. I spent  30 + minutes which includes reviewing patient chart , interpreting previous lab results  , discussing and providing counseling and coordinating care of patient's multiple health issues with  the patient. Return in about 1 year (around 9/14/2023). Please note that some or all of this report was generated using voice recognition software. Please notify me in case of any questions about the content of this document, as some errors in transcription may have occurred .

## 2022-09-19 ENCOUNTER — OFFICE VISIT (OUTPATIENT)
Dept: INTERNAL MEDICINE CLINIC | Age: 72
End: 2022-09-19
Payer: COMMERCIAL

## 2022-09-19 VITALS
OXYGEN SATURATION: 95 % | BODY MASS INDEX: 24.94 KG/M2 | DIASTOLIC BLOOD PRESSURE: 78 MMHG | WEIGHT: 127 LBS | HEIGHT: 60 IN | TEMPERATURE: 97.5 F | HEART RATE: 79 BPM | SYSTOLIC BLOOD PRESSURE: 122 MMHG

## 2022-09-19 DIAGNOSIS — Z12.31 ENCOUNTER FOR SCREENING MAMMOGRAM FOR MALIGNANT NEOPLASM OF BREAST: ICD-10-CM

## 2022-09-19 DIAGNOSIS — E11.69 HYPERLIPIDEMIA DUE TO TYPE 2 DIABETES MELLITUS (HCC): ICD-10-CM

## 2022-09-19 DIAGNOSIS — J43.2 CENTRILOBULAR EMPHYSEMA (HCC): Primary | ICD-10-CM

## 2022-09-19 DIAGNOSIS — M54.2 NECK PAIN: ICD-10-CM

## 2022-09-19 DIAGNOSIS — F33.0 MILD EPISODE OF RECURRENT MAJOR DEPRESSIVE DISORDER (HCC): ICD-10-CM

## 2022-09-19 DIAGNOSIS — E78.5 HYPERLIPIDEMIA DUE TO TYPE 2 DIABETES MELLITUS (HCC): ICD-10-CM

## 2022-09-19 DIAGNOSIS — C34.2 MALIGNANT NEOPLASM OF MIDDLE LOBE OF RIGHT LUNG (HCC): ICD-10-CM

## 2022-09-19 PROBLEM — J96.00 ACUTE RESPIRATORY FAILURE (HCC): Status: RESOLVED | Noted: 2020-10-09 | Resolved: 2022-09-19

## 2022-09-19 LAB
CHOLESTEROL, TOTAL: 175 MG/DL (ref 0–199)
HDLC SERPL-MCNC: 69 MG/DL (ref 40–60)
LDL CHOLESTEROL CALCULATED: 81 MG/DL
TRIGL SERPL-MCNC: 125 MG/DL (ref 0–150)
VLDLC SERPL CALC-MCNC: 25 MG/DL

## 2022-09-19 PROCEDURE — 1123F ACP DISCUSS/DSCN MKR DOCD: CPT | Performed by: INTERNAL MEDICINE

## 2022-09-19 PROCEDURE — 3023F SPIROM DOC REV: CPT | Performed by: INTERNAL MEDICINE

## 2022-09-19 PROCEDURE — G8420 CALC BMI NORM PARAMETERS: HCPCS | Performed by: INTERNAL MEDICINE

## 2022-09-19 PROCEDURE — 3017F COLORECTAL CA SCREEN DOC REV: CPT | Performed by: INTERNAL MEDICINE

## 2022-09-19 PROCEDURE — 2022F DILAT RTA XM EVC RTNOPTHY: CPT | Performed by: INTERNAL MEDICINE

## 2022-09-19 PROCEDURE — 3052F HG A1C>EQUAL 8.0%<EQUAL 9.0%: CPT | Performed by: INTERNAL MEDICINE

## 2022-09-19 PROCEDURE — G8399 PT W/DXA RESULTS DOCUMENT: HCPCS | Performed by: INTERNAL MEDICINE

## 2022-09-19 PROCEDURE — 1036F TOBACCO NON-USER: CPT | Performed by: INTERNAL MEDICINE

## 2022-09-19 PROCEDURE — 1090F PRES/ABSN URINE INCON ASSESS: CPT | Performed by: INTERNAL MEDICINE

## 2022-09-19 PROCEDURE — G8427 DOCREV CUR MEDS BY ELIG CLIN: HCPCS | Performed by: INTERNAL MEDICINE

## 2022-09-19 PROCEDURE — 99214 OFFICE O/P EST MOD 30 MIN: CPT | Performed by: INTERNAL MEDICINE

## 2022-09-19 RX ORDER — TIZANIDINE 2 MG/1
2 TABLET ORAL 3 TIMES DAILY PRN
Qty: 30 TABLET | Refills: 0 | Status: SHIPPED | OUTPATIENT
Start: 2022-09-19 | End: 2022-09-27 | Stop reason: SDUPTHER

## 2022-09-19 SDOH — ECONOMIC STABILITY: FOOD INSECURITY: WITHIN THE PAST 12 MONTHS, THE FOOD YOU BOUGHT JUST DIDN'T LAST AND YOU DIDN'T HAVE MONEY TO GET MORE.: NEVER TRUE

## 2022-09-19 SDOH — ECONOMIC STABILITY: FOOD INSECURITY: WITHIN THE PAST 12 MONTHS, YOU WORRIED THAT YOUR FOOD WOULD RUN OUT BEFORE YOU GOT MONEY TO BUY MORE.: NEVER TRUE

## 2022-09-19 ASSESSMENT — PATIENT HEALTH QUESTIONNAIRE - PHQ9
2. FEELING DOWN, DEPRESSED OR HOPELESS: 1
6. FEELING BAD ABOUT YOURSELF - OR THAT YOU ARE A FAILURE OR HAVE LET YOURSELF OR YOUR FAMILY DOWN: 0
1. LITTLE INTEREST OR PLEASURE IN DOING THINGS: 1
10. IF YOU CHECKED OFF ANY PROBLEMS, HOW DIFFICULT HAVE THESE PROBLEMS MADE IT FOR YOU TO DO YOUR WORK, TAKE CARE OF THINGS AT HOME, OR GET ALONG WITH OTHER PEOPLE: 1
9. THOUGHTS THAT YOU WOULD BE BETTER OFF DEAD, OR OF HURTING YOURSELF: 0
SUM OF ALL RESPONSES TO PHQ9 QUESTIONS 1 & 2: 2
4. FEELING TIRED OR HAVING LITTLE ENERGY: 2
SUM OF ALL RESPONSES TO PHQ QUESTIONS 1-9: 7
3. TROUBLE FALLING OR STAYING ASLEEP: 1
SUM OF ALL RESPONSES TO PHQ QUESTIONS 1-9: 7
5. POOR APPETITE OR OVEREATING: 1
7. TROUBLE CONCENTRATING ON THINGS, SUCH AS READING THE NEWSPAPER OR WATCHING TELEVISION: 1
8. MOVING OR SPEAKING SO SLOWLY THAT OTHER PEOPLE COULD HAVE NOTICED. OR THE OPPOSITE, BEING SO FIGETY OR RESTLESS THAT YOU HAVE BEEN MOVING AROUND A LOT MORE THAN USUAL: 0
SUM OF ALL RESPONSES TO PHQ QUESTIONS 1-9: 7
SUM OF ALL RESPONSES TO PHQ QUESTIONS 1-9: 7

## 2022-09-19 ASSESSMENT — SOCIAL DETERMINANTS OF HEALTH (SDOH): HOW HARD IS IT FOR YOU TO PAY FOR THE VERY BASICS LIKE FOOD, HOUSING, MEDICAL CARE, AND HEATING?: NOT HARD AT ALL

## 2022-09-19 NOTE — RESULT ENCOUNTER NOTE
[facility]      Your recent lab results are normal.    Mckinley Chu MD  [address]

## 2022-09-19 NOTE — PROGRESS NOTES
Chief Complaint   Patient presents with    Memory Loss    Neck Pain     Right side of neck        Assessment/Plan:  [employer] was seen today for memory loss and neck pain. Diagnoses and all orders for this visit:    Centrilobular emphysema ([address]  Comments:  She is having an acute exacerbation. Continued 2nd hand smoke exposure. Mild episode of recurrent major depressive disorder ([address]  Comments:  Related to family issues. Taking Wellbutrin    Hyperlipidemia due to type 2 diabetes mellitus ([facility]  -     Lipid Panel; Future    Encounter for screening mammogram for malignant neoplasm of breast  -     SHANNON DIGITAL SCREEN W OR WO CAD BILATERAL; Future    Malignant neoplasm of middle lobe of right lung [facility])  Comments:  Scheduled for chest CT. Neck pain  -     tiZANidine (ZANAFLEX) 2 MG tablet; Take 1 tablet by mouth 3 times daily as needed (Neck spasm)    Other orders  -     Cancel: CT Lung Screen (Initial or Annual); Future      Vitals:    09/19/22 1021   BP: 122/78   Pulse: 79   Temp: 97.5 °F (36.4 °C)   SpO2: 95%       Physical Exam  Vitals reviewed. Constitutional:       General: She is not in acute distress. Appearance: She is well-developed. She is not diaphoretic. HENT:      Head: Normocephalic and atraumatic. Cardiovascular:      Rate and Rhythm: Normal rate and regular rhythm. Pulses: Normal pulses. Heart sounds: Normal heart sounds. No murmur heard. No friction rub. No gallop. Pulmonary:      Effort: Pulmonary effort is normal. No respiratory distress. Breath sounds: No stridor. Wheezing and rales present. No rhonchi. Chest:      Chest wall: No tenderness. Neurological:      Mental Status: She is alert and oriented to person, place, and time. Cranial Nerves: No cranial nerve deficit. Psychiatric:         Behavior: Behavior normal.         Thought Content:  Thought content normal.         Judgment: Judgment normal.       PHQ Scores 9/19/2022 5/20/2022 5/20/2022 9/1/2021 4/5/2021 10/5/2020 10/30/2019   PHQ2 Score 2 0 0 2 4 6 5   PHQ9 Score 7 1 0 2 19 18 17     Interpretation of Total Score Depression Severity: 1-4 = Minimal depression, 5-9 = Mild depression, 10-14 = Moderate depression, 15-19 = Moderately severe depression, 20-27 = Severe depression    Mala Caceres MD  [facility]

## 2022-09-23 DIAGNOSIS — R07.9 CHEST PAIN, UNSPECIFIED TYPE: Primary | ICD-10-CM

## 2022-09-27 ENCOUNTER — TELEPHONE (OUTPATIENT)
Dept: INTERNAL MEDICINE CLINIC | Age: 72
End: 2022-09-27

## 2022-09-27 DIAGNOSIS — M54.2 NECK PAIN: ICD-10-CM

## 2022-09-27 DIAGNOSIS — J90 PLEURAL EFFUSION ON RIGHT: Primary | ICD-10-CM

## 2022-09-27 RX ORDER — TIZANIDINE 2 MG/1
2 TABLET ORAL 3 TIMES DAILY PRN
Qty: 30 TABLET | Refills: 0 | Status: SHIPPED | OUTPATIENT
Start: 2022-09-27

## 2022-09-27 RX ORDER — PREDNISONE 10 MG/1
TABLET ORAL
Qty: 30 TABLET | Refills: 0 | Status: SHIPPED | OUTPATIENT
Start: 2022-09-27 | End: 2022-10-12 | Stop reason: ALTCHOICE

## 2022-10-03 DIAGNOSIS — F41.9 ANXIETY: ICD-10-CM

## 2022-10-03 RX ORDER — ALPRAZOLAM 0.5 MG/1
TABLET ORAL
Qty: 90 TABLET | Refills: 2 | Status: SHIPPED | OUTPATIENT
Start: 2022-10-03 | End: 2023-01-01

## 2022-10-05 ENCOUNTER — TELEPHONE (OUTPATIENT)
Dept: INTERNAL MEDICINE CLINIC | Age: 72
End: 2022-10-05

## 2022-10-05 NOTE — TELEPHONE ENCOUNTER
----- Message from Johanna Lloyd sent at 10/5/2022 10:44 AM EDT -----  Subject: Appointment Request    Reason for Call: Established Patient Appointment needed: Routine Existing   Condition Follow Up    QUESTIONS    Reason for appointment request? No appointments available during search     Additional Information for Provider?  Patient needs to speak or visit with   Dr David Browning as a follow up to shoulder pain that has moved to breast/chest   area, no appts til december in system  ---------------------------------------------------------------------------  --------------  [address]  [phone]; OK to leave message on voicemail  ---------------------------------------------------------------------------  --------------  SCRIPT ANSWERS  COVID Screen: Mayte Golden

## 2022-10-12 ENCOUNTER — OFFICE VISIT (OUTPATIENT)
Dept: INTERNAL MEDICINE CLINIC | Age: 72
End: 2022-10-12
Payer: COMMERCIAL

## 2022-10-12 VITALS
BODY MASS INDEX: 25.19 KG/M2 | HEART RATE: 78 BPM | DIASTOLIC BLOOD PRESSURE: 88 MMHG | SYSTOLIC BLOOD PRESSURE: 152 MMHG | OXYGEN SATURATION: 97 % | WEIGHT: 129 LBS | TEMPERATURE: 97.1 F

## 2022-10-12 DIAGNOSIS — R07.89 CHEST WALL PAIN: ICD-10-CM

## 2022-10-12 DIAGNOSIS — R10.11 ABDOMINAL WALL PAIN IN RIGHT UPPER QUADRANT: ICD-10-CM

## 2022-10-12 DIAGNOSIS — J43.2 CENTRILOBULAR EMPHYSEMA (HCC): Primary | ICD-10-CM

## 2022-10-12 DIAGNOSIS — E11.59 HYPERTENSION ASSOCIATED WITH DIABETES (HCC): ICD-10-CM

## 2022-10-12 DIAGNOSIS — I15.2 HYPERTENSION ASSOCIATED WITH DIABETES (HCC): ICD-10-CM

## 2022-10-12 LAB
A/G RATIO: 1.7 (ref 1.1–2.2)
ALBUMIN SERPL-MCNC: 4.5 G/DL (ref 3.4–5)
ALP BLD-CCNC: 128 U/L (ref 40–129)
ALT SERPL-CCNC: 22 U/L (ref 10–40)
ANION GAP SERPL CALCULATED.3IONS-SCNC: 10 MMOL/L (ref 3–16)
AST SERPL-CCNC: 17 U/L (ref 15–37)
BASOPHILS ABSOLUTE: 0.1 K/UL (ref 0–0.2)
BASOPHILS RELATIVE PERCENT: 0.6 %
BILIRUB SERPL-MCNC: 0.4 MG/DL (ref 0–1)
BUN BLDV-MCNC: 14 MG/DL (ref 7–20)
CALCIUM SERPL-MCNC: 10.1 MG/DL (ref 8.3–10.6)
CHLORIDE BLD-SCNC: 103 MMOL/L (ref 99–110)
CO2: 29 MMOL/L (ref 21–32)
CREAT SERPL-MCNC: 0.8 MG/DL (ref 0.6–1.2)
EOSINOPHILS ABSOLUTE: 0.3 K/UL (ref 0–0.6)
EOSINOPHILS RELATIVE PERCENT: 3.1 %
GFR AFRICAN AMERICAN: >60
GFR NON-AFRICAN AMERICAN: >60
GLUCOSE BLD-MCNC: 171 MG/DL (ref 70–99)
HCT VFR BLD CALC: 37.9 % (ref 36–48)
HEMOGLOBIN: 12.2 G/DL (ref 12–16)
LIPASE: 41 U/L (ref 13–60)
LYMPHOCYTES ABSOLUTE: 2.3 K/UL (ref 1–5.1)
LYMPHOCYTES RELATIVE PERCENT: 24.1 %
MCH RBC QN AUTO: 29.5 PG (ref 26–34)
MCHC RBC AUTO-ENTMCNC: 32.1 G/DL (ref 31–36)
MCV RBC AUTO: 91.8 FL (ref 80–100)
MONOCYTES ABSOLUTE: 0.8 K/UL (ref 0–1.3)
MONOCYTES RELATIVE PERCENT: 8.3 %
NEUTROPHILS ABSOLUTE: 6 K/UL (ref 1.7–7.7)
NEUTROPHILS RELATIVE PERCENT: 63.9 %
PDW BLD-RTO: 14.5 % (ref 12.4–15.4)
PLATELET # BLD: 241 K/UL (ref 135–450)
PMV BLD AUTO: 9.2 FL (ref 5–10.5)
POTASSIUM SERPL-SCNC: 4.6 MMOL/L (ref 3.5–5.1)
RBC # BLD: 4.13 M/UL (ref 4–5.2)
SODIUM BLD-SCNC: 142 MMOL/L (ref 136–145)
TOTAL PROTEIN: 7.1 G/DL (ref 6.4–8.2)
WBC # BLD: 9.4 K/UL (ref 4–11)

## 2022-10-12 PROCEDURE — G8417 CALC BMI ABV UP PARAM F/U: HCPCS | Performed by: INTERNAL MEDICINE

## 2022-10-12 PROCEDURE — 1090F PRES/ABSN URINE INCON ASSESS: CPT | Performed by: INTERNAL MEDICINE

## 2022-10-12 PROCEDURE — 3023F SPIROM DOC REV: CPT | Performed by: INTERNAL MEDICINE

## 2022-10-12 PROCEDURE — 2022F DILAT RTA XM EVC RTNOPTHY: CPT | Performed by: INTERNAL MEDICINE

## 2022-10-12 PROCEDURE — 3017F COLORECTAL CA SCREEN DOC REV: CPT | Performed by: INTERNAL MEDICINE

## 2022-10-12 PROCEDURE — 1036F TOBACCO NON-USER: CPT | Performed by: INTERNAL MEDICINE

## 2022-10-12 PROCEDURE — G8399 PT W/DXA RESULTS DOCUMENT: HCPCS | Performed by: INTERNAL MEDICINE

## 2022-10-12 PROCEDURE — 1123F ACP DISCUSS/DSCN MKR DOCD: CPT | Performed by: INTERNAL MEDICINE

## 2022-10-12 PROCEDURE — G8484 FLU IMMUNIZE NO ADMIN: HCPCS | Performed by: INTERNAL MEDICINE

## 2022-10-12 PROCEDURE — G8427 DOCREV CUR MEDS BY ELIG CLIN: HCPCS | Performed by: INTERNAL MEDICINE

## 2022-10-12 PROCEDURE — 99213 OFFICE O/P EST LOW 20 MIN: CPT | Performed by: INTERNAL MEDICINE

## 2022-10-12 PROCEDURE — 3052F HG A1C>EQUAL 8.0%<EQUAL 9.0%: CPT | Performed by: INTERNAL MEDICINE

## 2022-10-12 RX ORDER — LIDOCAINE 4 G/G
1 PATCH TOPICAL DAILY
Qty: 30 PATCH | Refills: 5 | Status: SHIPPED | OUTPATIENT
Start: 2022-10-12 | End: 2022-11-11

## 2022-10-12 RX ORDER — GABAPENTIN 100 MG/1
100 CAPSULE ORAL 3 TIMES DAILY
Qty: 90 CAPSULE | Refills: 5 | Status: SHIPPED | OUTPATIENT
Start: 2022-10-12 | End: 2022-11-01 | Stop reason: DRUGHIGH

## 2022-10-12 ASSESSMENT — PATIENT HEALTH QUESTIONNAIRE - PHQ9
9. THOUGHTS THAT YOU WOULD BE BETTER OFF DEAD, OR OF HURTING YOURSELF: 0
SUM OF ALL RESPONSES TO PHQ QUESTIONS 1-9: 16
SUM OF ALL RESPONSES TO PHQ9 QUESTIONS 1 & 2: 6
5. POOR APPETITE OR OVEREATING: 2
2. FEELING DOWN, DEPRESSED OR HOPELESS: 3
6. FEELING BAD ABOUT YOURSELF - OR THAT YOU ARE A FAILURE OR HAVE LET YOURSELF OR YOUR FAMILY DOWN: 0
SUM OF ALL RESPONSES TO PHQ QUESTIONS 1-9: 16
SUM OF ALL RESPONSES TO PHQ QUESTIONS 1-9: 16
1. LITTLE INTEREST OR PLEASURE IN DOING THINGS: 3
3. TROUBLE FALLING OR STAYING ASLEEP: 2
10. IF YOU CHECKED OFF ANY PROBLEMS, HOW DIFFICULT HAVE THESE PROBLEMS MADE IT FOR YOU TO DO YOUR WORK, TAKE CARE OF THINGS AT HOME, OR GET ALONG WITH OTHER PEOPLE: 1
7. TROUBLE CONCENTRATING ON THINGS, SUCH AS READING THE NEWSPAPER OR WATCHING TELEVISION: 3
4. FEELING TIRED OR HAVING LITTLE ENERGY: 3
SUM OF ALL RESPONSES TO PHQ QUESTIONS 1-9: 16
8. MOVING OR SPEAKING SO SLOWLY THAT OTHER PEOPLE COULD HAVE NOTICED. OR THE OPPOSITE, BEING SO FIGETY OR RESTLESS THAT YOU HAVE BEEN MOVING AROUND A LOT MORE THAN USUAL: 0

## 2022-10-12 NOTE — PROGRESS NOTES
Chief Complaint   Patient presents with    Breast Pain     Under right breast wrapping round back to shoulder blade       Assessment/Plan:  Kenny Moss was seen today for breast pain. Diagnoses and all orders for this visit:    Centrilobular emphysema ([address]    Chest wall pain  -     CBC with Auto Differential; Future  -     Comprehensive Metabolic Panel; Future  -     Lipase; Future  -     lidocaine 4 % external patch; Place 1 patch onto the skin daily  -     gabapentin (NEURONTIN) 100 MG capsule; Take 1 capsule by mouth 3 times daily for 180 days. Intended supply: 30 days    Abdominal wall pain in right upper quadrant  -     CBC with Auto Differential; Future  -     Comprehensive Metabolic Panel; Future  -     Lipase; Future  -     lidocaine 4 % external patch; Place 1 patch onto the skin daily  -     gabapentin (NEURONTIN) 100 MG capsule; Take 1 capsule by mouth 3 times daily for 180 days.  Intended supply: 30 days    Hypertension associated with diabetes ([address]  Comments:  Related to her chroic pain      Vitals:    10/12/22 1038   BP: (!) 152/88   Pulse:    Temp:    SpO2:      .physe  PHQ Scores 10/12/2022 9/19/2022 5/20/2022 5/20/2022 9/1/2021 4/5/2021 10/5/2020   PHQ2 Score 6 2 0 0 2 4 6   PHQ9 Score 16 7 1 0 2 19 18     Interpretation of Total Score Depression Severity: 1-4 = Minimal depression, 5-9 = Mild depression, 10-14 = Moderate depression, 15-19 = Moderately severe depression, 20-27 = Severe depression    MD Anastacia Rosa

## 2022-11-01 ENCOUNTER — OFFICE VISIT (OUTPATIENT)
Dept: ORTHOPEDIC SURGERY | Age: 72
End: 2022-11-01
Payer: COMMERCIAL

## 2022-11-01 ENCOUNTER — TELEPHONE (OUTPATIENT)
Dept: ORTHOPEDIC SURGERY | Age: 72
End: 2022-11-01

## 2022-11-01 VITALS — WEIGHT: 126 LBS | RESPIRATION RATE: 16 BRPM | HEIGHT: 60 IN | BODY MASS INDEX: 24.74 KG/M2

## 2022-11-01 DIAGNOSIS — M25.511 ACUTE PAIN OF RIGHT SHOULDER: ICD-10-CM

## 2022-11-01 DIAGNOSIS — M54.14 THORACIC RADICULITIS: Primary | ICD-10-CM

## 2022-11-01 DIAGNOSIS — G25.89 SCAPULAR DYSKINESIS: ICD-10-CM

## 2022-11-01 PROCEDURE — G8399 PT W/DXA RESULTS DOCUMENT: HCPCS | Performed by: STUDENT IN AN ORGANIZED HEALTH CARE EDUCATION/TRAINING PROGRAM

## 2022-11-01 PROCEDURE — 99204 OFFICE O/P NEW MOD 45 MIN: CPT | Performed by: STUDENT IN AN ORGANIZED HEALTH CARE EDUCATION/TRAINING PROGRAM

## 2022-11-01 PROCEDURE — 3017F COLORECTAL CA SCREEN DOC REV: CPT | Performed by: STUDENT IN AN ORGANIZED HEALTH CARE EDUCATION/TRAINING PROGRAM

## 2022-11-01 PROCEDURE — 1123F ACP DISCUSS/DSCN MKR DOCD: CPT | Performed by: STUDENT IN AN ORGANIZED HEALTH CARE EDUCATION/TRAINING PROGRAM

## 2022-11-01 PROCEDURE — 1090F PRES/ABSN URINE INCON ASSESS: CPT | Performed by: STUDENT IN AN ORGANIZED HEALTH CARE EDUCATION/TRAINING PROGRAM

## 2022-11-01 PROCEDURE — 1036F TOBACCO NON-USER: CPT | Performed by: STUDENT IN AN ORGANIZED HEALTH CARE EDUCATION/TRAINING PROGRAM

## 2022-11-01 PROCEDURE — G8420 CALC BMI NORM PARAMETERS: HCPCS | Performed by: STUDENT IN AN ORGANIZED HEALTH CARE EDUCATION/TRAINING PROGRAM

## 2022-11-01 PROCEDURE — G8427 DOCREV CUR MEDS BY ELIG CLIN: HCPCS | Performed by: STUDENT IN AN ORGANIZED HEALTH CARE EDUCATION/TRAINING PROGRAM

## 2022-11-01 PROCEDURE — G8484 FLU IMMUNIZE NO ADMIN: HCPCS | Performed by: STUDENT IN AN ORGANIZED HEALTH CARE EDUCATION/TRAINING PROGRAM

## 2022-11-01 RX ORDER — GABAPENTIN 100 MG/1
100 CAPSULE ORAL NIGHTLY
Qty: 30 CAPSULE | Refills: 0 | Status: SHIPPED | OUTPATIENT
Start: 2022-11-01 | End: 2022-12-01

## 2022-11-01 SDOH — HEALTH STABILITY: PHYSICAL HEALTH: ON AVERAGE, HOW MANY MINUTES DO YOU ENGAGE IN EXERCISE AT THIS LEVEL?: 0 MIN

## 2022-11-01 SDOH — HEALTH STABILITY: PHYSICAL HEALTH
ON AVERAGE, HOW MANY DAYS PER WEEK DO YOU ENGAGE IN MODERATE TO STRENUOUS EXERCISE (LIKE A BRISK WALK)?: PATIENT DECLINED

## 2022-11-01 ASSESSMENT — SOCIAL DETERMINANTS OF HEALTH (SDOH)
WITHIN THE LAST YEAR, HAVE TO BEEN RAPED OR FORCED TO HAVE ANY KIND OF SEXUAL ACTIVITY BY YOUR PARTNER OR EX-PARTNER?: NO
WITHIN THE LAST YEAR, HAVE YOU BEEN KICKED, HIT, SLAPPED, OR OTHERWISE PHYSICALLY HURT BY YOUR PARTNER OR EX-PARTNER?: NO
WITHIN THE LAST YEAR, HAVE YOU BEEN AFRAID OF YOUR PARTNER OR EX-PARTNER?: NO
WITHIN THE LAST YEAR, HAVE YOU BEEN HUMILIATED OR EMOTIONALLY ABUSED IN OTHER WAYS BY YOUR PARTNER OR EX-PARTNER?: NO

## 2022-11-01 NOTE — PROGRESS NOTES
New Patient: CERVICAL SPINE    Referring Provider:  Yulissa Gordon MD    CHIEF COMPLAINT:    Chief Complaint   Patient presents with    Shoulder Pain     right       HISTORY OF PRESENT ILLNESS:    Ms. Maryam Samayoa is a pleasant 67 y.o. old female with history of lung cancer and lobectomy here for consultation regarding her right sided upper back and shoulder pain. She states the pain began 2 months ago without injury. It started with neck pain and headaches then moved to the shoulder and now the rib cage. She feels like her shoulder blade is out of place. Her pain has steadily worsened since then. She rates her upper back and rib pain 5/10. She describes the pain as pulling, aching. Pain is worse with reaching with her right arm and slightly better with rest. The upper back pain radiates laterally to the anterior chest wall, underneath her right breast. She denies numbness and tingling in a distribution. She reports weakness of her right arm. Patient denies difficulty with fine motor skills. She denies lower extremity symptoms, gait abnormality and bowel or bladder dysfunction. The pain interferes with her sleep.     Current/Past Treatment:   Physical Therapy: none  Chiropractic:  none   Injection:  none   Medications:    NSAIDS: NONE  Muscle relaxer:   tizanidine, no relief   Steriods:   prednisone, temporary relief   Neuropathic medications:   gabapentin   Opioids: NONE  Other: NONE   Surgery/Consult NONE    Past Medical History:   Past Medical History:   Diagnosis Date    Acute respiratory failure ([address] 10/9/2020    Antineoplastic chemotherapy induced anemia 11/12/2015    Anxiety     Benign essential hypertension     Cervical radiculitis 11/3/2017    Cervicalgia 11/3/2017    Chemotherapy induced nausea and vomiting 9/24/2015    Chronic fatigue syndrome     Chronic obstructive pulmonary disease (COPD) ([address]     Contusion of left foot 3/8/2017    COPD, moderate ([address] 6/1/2016    INTERPRETATION: Spirometry showed decreased FVC of 1.86 L, 77% predicted and decreased  FEV-1 of 1.16 L, 60% predicted. FEV-1/FVC ratio was decreased at 63%. No response to bronchodilators demonstrated on spirometry. Lung volumes showed increased total lung capacity of 141% predicted and residual volume of 241% predicted. Diffusion capacity showed  decreased DLCO of 66% predicted. IMPRESSION: Mod    Diabetes mellitus type II, controlled ([address]     Encounter for chemotherapy management 10/8/2015    Hearing disorder, sensorineural 2/23/2017    Hyperlipidemia     Hyperthyroidism     Iron deficiency anemia     Left foot pain 2/22/2017    Lung cancer ([facility]     Lung mass     R lung mass    Malignant neoplasm of upper lobe of right lung ([facility] 5/5/2015    Myofascial pain on right side 10/1/2015    Neoplasm related pain     Primary osteoarthritis of right hip 3/8/2017    Rotator cuff tendinitis 11/3/2017    Tinnitus 2/23/2017    Tobacco user     quit smoking in jan, 2015    Uncontrolled type 2 diabetes mellitus without complication, without long-term current use of insulin 3/11/2016      Past Surgical History:     Past Surgical History:   Procedure Laterality Date    BLADDER REPAIR  1972    tuck    HYSTERECTOMY (CERVIX STATUS UNKNOWN)  1972    THORACOTOMY Right 6/17/2015    Dr. Christensen  - via VATS w/RUL for adenocarcinoma    TUNNELED VENOUS PORT PLACEMENT Left 7/17/15    Dr. Christensen   [facility] Stammer power injectable port - 8F (max 300psi; MRI conditional 3-Bhakti)     Current Medications:     Current Outpatient Medications:     lidocaine 4 % external patch, Place 1 patch onto the skin daily, Disp: 30 patch, Rfl: 5    gabapentin (NEURONTIN) 100 MG capsule, Take 1 capsule by mouth 3 times daily for 180 days.  Intended supply: 30 days, Disp: 90 capsule, Rfl: 5    ALPRAZolam (XANAX) 0.5 MG tablet, TAKE 1 TABLET BY MOUTH THREE TIMES DAILY AS NEEDED, Disp: 90 tablet, Rfl: 2    tiZANidine (ZANAFLEX) 2 MG tablet, Take 1 tablet by mouth 3 times daily as needed (Neck spasm) (Patient not taking: Reported on 10/12/2022), Disp: 30 tablet, Rfl: 0    buPROPion (WELLBUTRIN SR) 150 MG extended release tablet, TAKE 1 TABLET BY MOUTH TWICE DAILY, Disp: 180 tablet, Rfl: 1    albuterol sulfate  (90 Base) MCG/ACT inhaler, INHALE 2 PUFFS INTO THE LUNGS EVERY 6 HOURS AS NEEDED FOR WHEEZING, Disp: 6.7 g, Rfl: 5    blood glucose test strips (ACCU-CHEK IRA PLUS) strip, 1 each by In Vitro route daily As needed. , Disp: 100 each, Rfl: 3    Blood Glucose Monitoring Suppl (ACCU-CHEK IRA PLUS) w/Device KIT, 1 each by Does not apply route daily, Disp: 1 kit, Rfl: 0    atorvastatin (LIPITOR) 20 MG tablet, Take 1 tablet by mouth daily, Disp: 90 tablet, Rfl: 1    methIMAzole (TAPAZOLE) 5 MG tablet, TAKE 1 TABLET(5 MG) BY MOUTH THREE TIMES DAILY (Patient taking differently: Take 5 mg by mouth TAKE 1 TABLET(5 MG) BY MOUTH DAILY), Disp: 270 tablet, Rfl: 3    melatonin 3 MG TABS tablet, Take 3 mg by mouth nightly as needed (Patient not taking: No sig reported), Disp: , Rfl:     oxyCODONE (ROXICODONE) 5 MG immediate release tablet, Take 5 mg by mouth every 4 hours as needed for Pain (Oxycodone 5 mg Immediately Q 4-6 Hours PRN). Takes for cancer pain (phantom pain) where right rib lobectomy pain, Disp: , Rfl:     aspirin 81 MG chewable tablet, Take 81 mg by mouth daily Indications: OTC, Disp: , Rfl:   Allergies:  Codeine  Social History:    reports that she quit smoking about 7 years ago. Her smoking use included cigarettes. She has a 52.50 pack-year smoking history. She has never used smokeless tobacco. She reports that she does not drink alcohol and does not use drugs.   Family History:   Family History   Problem Relation Age of Onset    Diabetes Father         & TB history    Hypertension Father     Diabetes Sister     Diabetes Brother     Stroke Son     Osteoporosis Mother         & Angina    Anesth Problems Neg Hx     Malig Hyperten Neg Hx     Hypotension Neg Hx     Malig Hypertherm Neg Hx Pseudochol. Deficiency Neg Hx        REVIEW OF SYSTEMS: Full ROS noted & scanned   CONSTITUTIONAL: Denies unexplained weight loss, fevers, chills or fatigue  NEUROLOGICAL: Denies unsteady gait or progressive weakness  MUSCULOSKELETAL: Denies joint swelling or redness  PSYCHOLOGICAL: Denies anxiety, depression   SKIN: Denies skin changes, delayed healing, rash, itching   HEMATOLOGIC: Denies easy bleeding or bruising  ENDOCRINE: Denies excessive thirst, urination, heat/cold  RESPIRATORY: Denies current dyspnea, cough  GI: Denies nausea, vomiting, diarrhea   : Denies bowel or bladder issues      PHYSICAL EXAM:    Vitals: Resp. rate 16, height 5' (1.524 m), weight 126 lb (57.2 kg), not currently breastfeeding. GENERAL EXAM:  General Apparence: Patient is adequately groomed with no evidence of malnutrition. Orientation: The patient is oriented to time, place and person. Mood & Affect:The patient's mood and affect are appropriate. Vascular: Examination reveals no swelling tenderness in upper or lower extremities. Good capillary refill. Lymphatic: The lymphatic examination bilaterally reveals all areas to be without enlargement or induration  Sensation: Sensation is intact without deficit  Coordination/Balance: Good coordination     THORACIC EXAMINATION:  Inspection: Local inspection shows no step-off or bruising. Cervical alignment is normal. There is scapular dyskinesis on the right. Palpation: No evidence of tenderness at the midline. There is tenderness at the trapezius. Paraspinal tenderness is present. There is no step-off or paraspinal spasm. Range of Motion: Range of Motion Thoracic:  limited by 25% in all planes due to pain   Strength: 5/5 bilateral upper extremities   Special Tests:      Spurling's, L'Hermitte's & Saunders's negative bilaterally. Guzman and Impingement tests are negative bilaterally. Cubital and Carpal tunnel Tinel's negative bilaterally.       Skin:There are no rashes, ulcerations or lesions in right & left upper extremities. Reflexes: Bilaterally triceps, biceps and brachioradialis are 2+. Clonus absent bilaterally at the feet. Additional Examinations:       RIGHT UPPER EXTREMITY:  Inspection/examination of the right upper extremity does not show any tenderness, deformity or injury. Range of motion is limited to 90 with forward flexion and abduction. There is no gross instability. There are no rashes, ulcerations or lesions. Strength and tone are normal.  LEFT UPPER EXTREMITY: Inspection/examination of the left upper extremity does not show any tenderness, deformity or injury. Range of motion is full. There is no gross instability. There are no rashes, ulcerations or lesions. Strength and tone are normal.      Diagnostic Testing:    Right Shoulder X-Ray: 3 views obtained and reviewed  AC Joint: no abnormalities noted  Glenohumeral joint: no abnormalities noted  Elevation humeral head: absent    Reviewed TSP XR's:  Mild scoliotic curve with degenerative disc disease in the mid thoracic   spine. No evidence of an acute osseous abnormality. Impression:    Diagnosis Orders   1. Acute pain of right shoulder  XR SHOULDER RIGHT (MIN 2 VIEWS)      2. Thoracic radiculitis        3. Scapular dyskinesis              Plan:    Above diagnoses are Worsening    1. Medications: I will add a gabapentin 100 mg nightly to the current regimen. Counseled on risks, benefits and alternatives and recommended not to take the medicine and drive or operate heavy machinery. 2. PT:  Encouraged to continue with HEP. 3. Further studies:  Setup for Thoracic MR without contrast to evaluate for soft tissue pathology or stenosis contributing to the neck pain and paresthesia. Evaluate for HNP or stenosis. The patient has history of lung cancer. MRI to also rule out metastasis.      4. Interventional:  After further imaging is obtained, interventional options will be reviewed and recommended.           Nuno Horn PA-C  Board Certified by the [employer] on Certification of [address] [facility] and [employer] [facility]

## 2022-11-01 NOTE — TELEPHONE ENCOUNTER
I attempted to contact Daphne Terrazas at [phone], however, her phone continued to ring and only provided a busy signal after ringing longer than 2 minutes. L/M FOR LAURA - son of Michael Rollins  regarding MRI TSP approval and authorization being valid until 1/31/2022. Patient was instructed that their MRI needs to be scheduled at [facility] . The patient was instructed to contact the facility to schedule  at [phone]. A follow up appointment will need to be scheduled to review the results and treatment plan. I reached out via [employer] as well.

## 2022-11-04 NOTE — TELEPHONE ENCOUNTER
Attempted to contact [facility] again; however, after ringing for more than 2 minutes, the phone continued to provide a busy signal.

## 2022-11-08 ENCOUNTER — OFFICE VISIT (OUTPATIENT)
Dept: PULMONOLOGY | Age: 72
End: 2022-11-08
Payer: COMMERCIAL

## 2022-11-08 VITALS — HEART RATE: 81 BPM | OXYGEN SATURATION: 92 %

## 2022-11-08 DIAGNOSIS — J44.9 COPD, MODERATE (HCC): Primary | ICD-10-CM

## 2022-11-08 DIAGNOSIS — Z85.118 HISTORY OF LUNG CANCER: ICD-10-CM

## 2022-11-08 DIAGNOSIS — J43.2 CENTRILOBULAR EMPHYSEMA (HCC): ICD-10-CM

## 2022-11-08 DIAGNOSIS — J96.11 CHRONIC HYPOXEMIC RESPIRATORY FAILURE (HCC): ICD-10-CM

## 2022-11-08 PROCEDURE — G8399 PT W/DXA RESULTS DOCUMENT: HCPCS | Performed by: INTERNAL MEDICINE

## 2022-11-08 PROCEDURE — G8484 FLU IMMUNIZE NO ADMIN: HCPCS | Performed by: INTERNAL MEDICINE

## 2022-11-08 PROCEDURE — G8427 DOCREV CUR MEDS BY ELIG CLIN: HCPCS | Performed by: INTERNAL MEDICINE

## 2022-11-08 PROCEDURE — 1123F ACP DISCUSS/DSCN MKR DOCD: CPT | Performed by: INTERNAL MEDICINE

## 2022-11-08 PROCEDURE — 1090F PRES/ABSN URINE INCON ASSESS: CPT | Performed by: INTERNAL MEDICINE

## 2022-11-08 PROCEDURE — 3023F SPIROM DOC REV: CPT | Performed by: INTERNAL MEDICINE

## 2022-11-08 PROCEDURE — 1036F TOBACCO NON-USER: CPT | Performed by: INTERNAL MEDICINE

## 2022-11-08 PROCEDURE — G8420 CALC BMI NORM PARAMETERS: HCPCS | Performed by: INTERNAL MEDICINE

## 2022-11-08 PROCEDURE — 3017F COLORECTAL CA SCREEN DOC REV: CPT | Performed by: INTERNAL MEDICINE

## 2022-11-08 PROCEDURE — 99214 OFFICE O/P EST MOD 30 MIN: CPT | Performed by: INTERNAL MEDICINE

## 2022-11-08 NOTE — PROGRESS NOTES
[facility]  Progress Note  Ldiya Napier MD       [address]   YOB: 1950    Date of Visit:  11/8/2022    Assessment/Plan:  1. COPD, moderate (HCC)/centrilobular emphysema  PFT 5/16:  INTERPRETATION: Spirometry showed decreased FVC of 1.86 L, 77% predicted and  decreased   FEV-1 of 1.16 L, 60% predicted. FEV-1/FVC ratio was decreased at 63%. No  response to bronchodilators demonstrated on spirometry. Lung volumes showed  increased total lung capacity of 141% predicted and residual volume of 241%  predicted. Diffusion capacity showed   decreased DLCO of 66% predicted. IMPRESSION: Moderate obstructive defect on spirometry with no response to  bronchodilators. Both air trapping and hyperinflation were present on lung  volumes and moderate decrease in diffusion capacity. In comparison to the  test that was done in February of 2015, [facility] has decreased by 11%, FEV-1 by  23% with no significant change in total lung capacity or DLCO. Medication Management:  The patient benefits from the medical regimen and reports no complications. Breo ==> \"rarely\" because it doesn't help much (also I think cost is an issue) ==> Trelegy, I gave her some samples. Albuterol      2. Malignant neoplasm of upper lobe of right lung [facility])  S/p RULobectomy  CT chest 9/22:     FINDINGS:   CT CHEST:       Thyroid: Mild prominence of the thyroid. Vasculature: The thoracic aorta and arch vasculature have a normal    noncontrasted appearance and are normal size and contour. The coronary    arteries are grossly normal caliber without focal abnormality. Atherosclerosis of the thoracic aorta. Heart: The heart size is normal.  There is no evidence for pericardial    effusion. Mediastinum: No pathologically enlarged mediastinal lymph nodes are seen. No focal mass lesions are identified. There is no evidence for hiatal    hernia. Pleura:  There is no evidence for pleural effusion or focal pleural    thickening. Tracheobronchial tree: The tracheobronchial tree is clear. No focal    abnormalities are seen. Lungs: Postsurgical changes consistent with a partial resection of the    right lung. Centrilobular emphysema. Stable noncalcified 4 mm pulmonary    nodule in the right lower lobe seen on series 3 image 23. Previous    groundglass opacity in the left upper lobe is not visualized on today's    study. No new or enlarging nodules. Upper abdomen: Atherosclerosis of the abdominal aorta. Punctate    calcification in the right kidney. Stable right adrenal nodule, suggestive    of an adenoma. Bones: The visualized osseous structures are intact. No evidence for lytic    or blastic lesions. Degenerative changes of the spine. Chest wall: The chest wall and axilla are within normal limits. Left    anterior chest wall port. [IMPRESSION]   1. Stable noncalcified pulmonary nodule in the right lower lobe. No new    or enlarging nodules. No significant interval change compared to prior    study dated 3/9/2022. 2.  Previous groundglass opacity in the left upper lobe seen on 3/9/2022    is not visualized on today's study. 3.  Additional findings as described above. This revealed evidence of a small pulmonary nodule in the right upper lobe about 3 mm in diameter. Additionally, CT imaging revealed evidence of centrilobular emphysema  Repeat CT chest around     4. Chronic hypoxemic respiratory failure  Oxygen saturation on room air with exertion in the office today is 81%. The patient would benefit from supplemental oxygen with exertion and sleep  The patient is independently mobile within the home and would benefit from a portable oxygen concentrator  She should be on oxygen 24 hours/day. She is doing better with this.       Recommend COVID vaccine -- She still has not gotten this  Also declines the flu shot      FOLLOW UP: 6 months      Chief Complaint   Patient presents with    Shortness of Breath     Here for a 6 month f.u. saw Ortho and told she has a drop shoulder and pt ? If this can effect her breathing. More short of breath now then ever before         HPI  The patient presents with a chief complaint of moderate shortness of breath related to Severe COPD of many years duration. He has mild associated cough. Exertion is a modifying factor. She has \"dropped shoulder\"/ She feels like this has made her breathing worse. However, she is not taking her Breo 2.2 lack of effectiveness and cough. She is not smoking . She is O2 dependent. .      Review of Systems  No Chest pain, Nausea or vomiting reported    Physical Exam:  Well developed, well nourished  Alert and oriented  Sclera is clear  No cervical adenopathy  No JVD. Chest examination is diffuse wheezes. Cardiac examination reveals regular rate and rhythm without murmur, gallop or rub. The abdomen is soft, nontender and nondistended. There is no clubbing, cyanosis or edema of the extremities. There is no obvious skin rash. No focal neuro deficicts  Normal mood and affect    Allergies   Allergen Reactions    Codeine Anxiety     Prior to Visit Medications    Medication Sig Taking? Authorizing Provider   gabapentin (NEURONTIN) 100 MG capsule Take 1 capsule by mouth nightly for 30 days.  Intended supply: 30 days  ERICK Castro   lidocaine 4 % external patch Place 1 patch onto the skin daily  Nereida Kan MD   ALPRAZolam Earlie Sicard) 0.5 MG tablet TAKE 1 TABLET BY MOUTH THREE TIMES DAILY AS NEEDED  Nereida Kan MD   tiZANidine (ZANAFLEX) 2 MG tablet Take 1 tablet by mouth 3 times daily as needed (Neck spasm)  Patient not taking: Reported on 10/12/2022  Nereida Kan MD   buPROPion [facility] SR) 150 MG extended release tablet TAKE 1 TABLET BY MOUTH TWICE DAILY  Nereida Kan MD   albuterol sulfate  (90 Base) MCG/ACT inhaler INHALE 2 PUFFS INTO THE LUNGS EVERY 6 HOURS AS NEEDED FOR WHEEZING  Treasure Moe MD   blood glucose test strips (ACCU-CHEK IRA PLUS) strip 1 each by In Vitro route daily As needed. Prem Meraz MD   Blood Glucose Monitoring Suppl (ACCU-CHEK IRA PLUS) w/Device KIT 1 each by Does not apply route daily  Prem Meraz MD   atorvastatin (LIPITOR) 20 MG tablet Take 1 tablet by mouth daily  Prem Meraz MD   methIMAzole (TAPAZOLE) 5 MG tablet TAKE 1 TABLET(5 MG) BY MOUTH THREE TIMES DAILY  Patient taking differently: Take 5 mg by mouth TAKE 1 TABLET(5 MG) BY MOUTH DAILY  Moni Vásquez MD   melatonin 3 MG TABS tablet Take 3 mg by mouth nightly as needed  Patient not taking: No sig reported  Historical Provider, MD   oxyCODONE (ROXICODONE) 5 MG immediate release tablet Take 5 mg by mouth every 4 hours as needed for Pain (Oxycodone 5 mg Immediately Q 4-6 Hours PRN). Takes for cancer pain (phantom pain) where right rib lobectomy pain  Historical Provider, MD   aspirin 81 MG chewable tablet Take 81 mg by mouth daily Indications: OTC  Historical Provider, MD       Vitals:    22 1509   Pulse: 81   SpO2: 92%     There is no height or weight on file to calculate BMI.      Wt Readings from Last 3 Encounters:   22 126 lb (57.2 kg)   10/12/22 129 lb (58.5 kg)   22 127 lb (57.6 kg)     BP Readings from Last 3 Encounters:   10/12/22 (!) 152/88   22 122/78   22 (!) 158/77        Social History     Tobacco Use   Smoking Status Former    Packs/day: 1.50    Years: 35.00    Pack years: 52.50    Types: Cigarettes    Quit date: 2015    Years since quittin.8   Smokeless Tobacco Never

## 2022-11-21 RX ORDER — ALBUTEROL SULFATE 90 UG/1
2 AEROSOL, METERED RESPIRATORY (INHALATION) EVERY 6 HOURS PRN
Qty: 6.7 G | Refills: 5 | Status: SHIPPED | OUTPATIENT
Start: 2022-11-21 | End: 2023-11-21

## 2022-11-22 ENCOUNTER — OFFICE VISIT (OUTPATIENT)
Dept: ORTHOPEDIC SURGERY | Age: 72
End: 2022-11-22
Payer: COMMERCIAL

## 2022-11-22 VITALS — BODY MASS INDEX: 25.48 KG/M2 | RESPIRATION RATE: 16 BRPM | HEIGHT: 60 IN | WEIGHT: 129.8 LBS

## 2022-11-22 DIAGNOSIS — M54.14 THORACIC RADICULITIS: Primary | ICD-10-CM

## 2022-11-22 DIAGNOSIS — G25.89 SCAPULAR DYSKINESIS: ICD-10-CM

## 2022-11-22 DIAGNOSIS — M54.14 THORACIC RADICULITIS: ICD-10-CM

## 2022-11-22 PROCEDURE — G8417 CALC BMI ABV UP PARAM F/U: HCPCS | Performed by: STUDENT IN AN ORGANIZED HEALTH CARE EDUCATION/TRAINING PROGRAM

## 2022-11-22 PROCEDURE — G8399 PT W/DXA RESULTS DOCUMENT: HCPCS | Performed by: STUDENT IN AN ORGANIZED HEALTH CARE EDUCATION/TRAINING PROGRAM

## 2022-11-22 PROCEDURE — 99213 OFFICE O/P EST LOW 20 MIN: CPT | Performed by: STUDENT IN AN ORGANIZED HEALTH CARE EDUCATION/TRAINING PROGRAM

## 2022-11-22 PROCEDURE — G8427 DOCREV CUR MEDS BY ELIG CLIN: HCPCS | Performed by: STUDENT IN AN ORGANIZED HEALTH CARE EDUCATION/TRAINING PROGRAM

## 2022-11-22 PROCEDURE — 1036F TOBACCO NON-USER: CPT | Performed by: STUDENT IN AN ORGANIZED HEALTH CARE EDUCATION/TRAINING PROGRAM

## 2022-11-22 PROCEDURE — 1090F PRES/ABSN URINE INCON ASSESS: CPT | Performed by: STUDENT IN AN ORGANIZED HEALTH CARE EDUCATION/TRAINING PROGRAM

## 2022-11-22 PROCEDURE — G8484 FLU IMMUNIZE NO ADMIN: HCPCS | Performed by: STUDENT IN AN ORGANIZED HEALTH CARE EDUCATION/TRAINING PROGRAM

## 2022-11-22 PROCEDURE — 3017F COLORECTAL CA SCREEN DOC REV: CPT | Performed by: STUDENT IN AN ORGANIZED HEALTH CARE EDUCATION/TRAINING PROGRAM

## 2022-11-22 PROCEDURE — 1123F ACP DISCUSS/DSCN MKR DOCD: CPT | Performed by: STUDENT IN AN ORGANIZED HEALTH CARE EDUCATION/TRAINING PROGRAM

## 2022-11-22 NOTE — PROGRESS NOTES
New Patient: CERVICAL SPINE    Referring Provider:  No ref. provider found    CHIEF COMPLAINT:    Chief Complaint   Patient presents with    Back Pain     TR MRI TSP        HISTORY OF PRESENT ILLNESS:    Ms. Cristobal Sethi is a pleasant 67 y.o. old female with history of lung cancer and lobectomy here for consultation regarding her right sided upper back and shoulder pain. She states the pain began 2 months ago without injury. It started with neck pain and headaches then moved to the shoulder and now the rib cage. She feels like her shoulder blade is out of place. Her pain has steadily worsened since then. She rates her upper back and rib pain 5/10. She describes the pain as pulling, aching. Pain is worse with reaching with her right arm and slightly better with rest. The upper back pain radiates laterally to the anterior chest wall, underneath her right breast. She denies numbness and tingling in a distribution. She reports weakness of her right arm. Patient denies difficulty with fine motor skills. She denies lower extremity symptoms, gait abnormality and bowel or bladder dysfunction. The pain interferes with her sleep. Interval history: The patient presents for MRI follow up. The radicular pain from her thoracic spine is better. The gabapentin helps significantly. Her sleep has improved. She rates pain 3/10. Today she complains of limited range of motion of the shoulder with forward flexion and abduction. She describes it as tightness throughout her upper back, rib cage and shoulder.        Past Medical History:   Past Medical History:   Diagnosis Date    Acute respiratory failure ([facility] [address] 10/9/2020    Antineoplastic chemotherapy induced anemia 11/12/2015    Anxiety     Benign essential hypertension     Cervical radiculitis 11/3/2017    Cervicalgia 11/3/2017    Chemotherapy induced nausea and vomiting 9/24/2015    Chronic fatigue syndrome     Chronic obstructive pulmonary disease (COPD) (HCC)     Contusion of left foot 3/8/2017    COPD, moderate ([address] 6/1/2016    INTERPRETATION: Spirometry showed decreased FVC of 1.86 L, 77% predicted and decreased  FEV-1 of 1.16 L, 60% predicted. FEV-1/FVC ratio was decreased at 63%. No response to bronchodilators demonstrated on spirometry. Lung volumes showed increased total lung capacity of 141% predicted and residual volume of 241% predicted. Diffusion capacity showed  decreased DLCO of 66% predicted. IMPRESSION: Mod    Diabetes mellitus type II, controlled ([address]     Encounter for chemotherapy management 10/8/2015    Hearing disorder, sensorineural 2/23/2017    Hyperlipidemia     Hyperthyroidism     Iron deficiency anemia     Left foot pain 2/22/2017    Lung cancer ([address]     Lung mass     R lung mass    Malignant neoplasm of upper lobe of right lung ([address] 5/5/2015    Myofascial pain on right side 10/1/2015    Neoplasm related pain     Primary osteoarthritis of right hip 3/8/2017    Rotator cuff tendinitis 11/3/2017    Tinnitus 2/23/2017    Tobacco user     quit smoking in jan, 2015    Uncontrolled type 2 diabetes mellitus without complication, without long-term current use of insulin 3/11/2016      Past Surgical History:     Past Surgical History:   Procedure Laterality Date    BLADDER REPAIR  1972    tuck    HYSTERECTOMY (CERVIX STATUS UNKNOWN)  1972    THORACOTOMY Right 6/17/2015    Dr. Mary Park - via VATS w/RUL for adenocarcinoma    TUNNELED VENOUS PORT PLACEMENT Left 7/17/15    Dr. Mary Park  Mi Fair power injectable port - 8F (max 300psi; MRI conditional 3-Bhakti)     Current Medications:     Current Outpatient Medications:     albuterol sulfate HFA (PROVENTIL;VENTOLIN;PROAIR) 108 (90 Base) MCG/ACT inhaler, INHALE 2 PUFFS INTO THE LUNGS EVERY 6 HOURS AS NEEDED FOR WHEEZING, Disp: 6.7 g, Rfl: 5    gabapentin (NEURONTIN) 100 MG capsule, Take 1 capsule by mouth nightly for 30 days.  Intended supply: 30 days, Disp: 30 capsule, Rfl: 0    ALPRAZolam (XANAX) 0.5 MG tablet, TAKE 1 TABLET BY MOUTH THREE TIMES DAILY AS NEEDED, Disp: 90 tablet, Rfl: 2    tiZANidine (ZANAFLEX) 2 MG tablet, Take 1 tablet by mouth 3 times daily as needed (Neck spasm), Disp: 30 tablet, Rfl: 0    buPROPion (WELLBUTRIN SR) 150 MG extended release tablet, TAKE 1 TABLET BY MOUTH TWICE DAILY, Disp: 180 tablet, Rfl: 1    blood glucose test strips (ACCU-CHEK IRA PLUS) strip, 1 each by In Vitro route daily As needed. , Disp: 100 each, Rfl: 3    Blood Glucose Monitoring Suppl (ACCU-CHEK IRA PLUS) w/Device KIT, 1 each by Does not apply route daily, Disp: 1 kit, Rfl: 0    atorvastatin (LIPITOR) 20 MG tablet, Take 1 tablet by mouth daily, Disp: 90 tablet, Rfl: 1    methIMAzole (TAPAZOLE) 5 MG tablet, TAKE 1 TABLET(5 MG) BY MOUTH THREE TIMES DAILY (Patient taking differently: Take 5 mg by mouth TAKE 1 TABLET(5 MG) BY MOUTH DAILY), Disp: 270 tablet, Rfl: 3    oxyCODONE (ROXICODONE) 5 MG immediate release tablet, Take 5 mg by mouth every 4 hours as needed for Pain (Oxycodone 5 mg Immediately Q 4-6 Hours PRN). Takes for cancer pain (phantom pain) where right rib lobectomy pain, Disp: , Rfl:     aspirin 81 MG chewable tablet, Take 81 mg by mouth daily Indications: OTC, Disp: , Rfl:   Allergies:  Codeine  Social History:    reports that she quit smoking about 7 years ago. Her smoking use included cigarettes. She has a 52.50 pack-year smoking history. She has never used smokeless tobacco. She reports that she does not drink alcohol and does not use drugs. Family History:   Family History   Problem Relation Age of Onset    Diabetes Father         & TB history    Hypertension Father     Diabetes Sister     Diabetes Brother     Stroke Son     Osteoporosis Mother         & Angina    Anesth Problems Neg Hx     Malig Hyperten Neg Hx     Hypotension Neg Hx     Malig Hypertherm Neg Hx     Pseudochol.  Deficiency Neg Hx        REVIEW OF SYSTEMS: Full ROS noted & scanned   CONSTITUTIONAL: Denies unexplained weight loss, fevers, chills or fatigue  NEUROLOGICAL: Denies unsteady gait or progressive weakness  MUSCULOSKELETAL: Denies joint swelling or redness  PSYCHOLOGICAL: Denies anxiety, depression   SKIN: Denies skin changes, delayed healing, rash, itching   HEMATOLOGIC: Denies easy bleeding or bruising  ENDOCRINE: Denies excessive thirst, urination, heat/cold  RESPIRATORY: Denies current dyspnea, cough  GI: Denies nausea, vomiting, diarrhea   : Denies bowel or bladder issues      PHYSICAL EXAM:    Vitals: Resp. rate 16, height 5' (1.524 m), weight 129 lb 12.8 oz (58.9 kg), not currently breastfeeding. GENERAL EXAM:  General Apparence: Patient is adequately groomed with no evidence of malnutrition. Orientation: The patient is oriented to time, place and person. Mood & Affect:The patient's mood and affect are appropriate. Vascular: Examination reveals no swelling tenderness in upper or lower extremities. Good capillary refill. Lymphatic: The lymphatic examination bilaterally reveals all areas to be without enlargement or induration  Sensation: Sensation is intact without deficit  Coordination/Balance: Good coordination     THORACIC EXAMINATION:  Inspection: Local inspection shows no step-off or bruising. Cervical alignment is normal. There is scapular dyskinesis on the right. Palpation: No evidence of tenderness at the midline. There is tenderness at the trapezius. Paraspinal tenderness is present. There is no step-off or paraspinal spasm. Range of Motion: Range of Motion Thoracic:  pain-free ROM. Right shoulder PROM: forward flexion 135, ER 45, AROM 180   Strength: 5/5 bilateral upper extremities   Special Tests:      Spurling's, L'Hermitte's & Saunders's negative bilaterally. Guzman and Impingement tests are negative bilaterally. Cubital and Carpal tunnel Tinel's negative bilaterally. Skin:There are no rashes, ulcerations or lesions in right & left upper extremities.   Reflexes: Bilaterally triceps, biceps and brachioradialis are 2+. Clonus absent bilaterally at the feet. Additional Examinations:       RIGHT UPPER EXTREMITY:  Inspection/examination of the right upper extremity does not show any tenderness, deformity or injury. Range of motion is limited to 90 with forward flexion and abduction. There is no gross instability. There are no rashes, ulcerations or lesions. Strength and tone are normal.  LEFT UPPER EXTREMITY: Inspection/examination of the left upper extremity does not show any tenderness, deformity or injury. Range of motion is full. There is no gross instability. There are no rashes, ulcerations or lesions. Strength and tone are normal.      Diagnostic Testing:    Right Shoulder X-Ray: 3 views obtained and reviewed  AC Joint: no abnormalities noted  Glenohumeral joint: no abnormalities noted  Elevation humeral head: absent    Reviewed TSP XR's:  Mild scoliotic curve with degenerative disc disease in the mid thoracic   spine. No evidence of an acute osseous abnormality. TSP MRI 11/16/22: 2mm disc herniation at T11-12 without cord contact or spinal stenosis   No evidence of thoracic spine compression fracture or bony destructive lesion to suggest metastatic disease   Partially visualized advanced degenerative disc disease at L1-2 with associated circumferential bulging disc             Impression:    Diagnosis Orders   1. Thoracic radiculitis        2. Scapular dyskinesis                Plan:    Above diagnoses are Worsening    1. Medications:  Continue physical therapy. 2. PT:  she has physical therapy scheduled for next week. 3. Further studies: We reviewed thoracic MRI. 4. Interventional:  At this point, no interventional options are recommended. Recommend continuing physical therapy.            Candace Falcon PA-C  Board Certified by the [employer] on Certification of [facility]

## 2022-12-07 ENCOUNTER — PATIENT MESSAGE (OUTPATIENT)
Dept: INTERNAL MEDICINE CLINIC | Age: 72
End: 2022-12-07

## 2022-12-07 DIAGNOSIS — F41.9 ANXIETY: ICD-10-CM

## 2022-12-07 RX ORDER — ALPRAZOLAM 0.5 MG/1
TABLET ORAL
Qty: 90 TABLET | Refills: 0 | Status: CANCELLED | OUTPATIENT
Start: 2022-12-07 | End: 2023-01-07

## 2022-12-07 NOTE — TELEPHONE ENCOUNTER
From: Nae Franco  To: Dr. Eusebio Monet: 12/7/2022 8:52 AM EST  Subject: Alprazolam    Can you send a new rx for Alprazolam to my new pharmacy. [address] [facility]. Please? I no longer use [facility] .  Thank you

## 2022-12-07 NOTE — TELEPHONE ENCOUNTER
Recent Visits  Date Type Provider Dept   10/12/22 Office Visit Jose Alberto Jones MD [facility]   09/19/22 Office Visit Jose Alberto Jones MD [facility]   05/20/22 Office Visit Jose Alberto Jones MD [facility]   09/01/21 Office Visit Jose Alberto Jones MD [facility]   Showing recent visits within past 540 days with a meds authorizing provider and meeting all other requirements  Future Appointments  Date Type Provider Dept   02/20/23 Appointment Jose Alberto Jones MD [facility]   Showing future appointments within next 150 days with a meds authorizing provider and meeting all other requirements     10/12/2022

## 2022-12-08 RX ORDER — ALPRAZOLAM 0.5 MG/1
TABLET ORAL
Qty: 90 TABLET | Refills: 0 | Status: SHIPPED | OUTPATIENT
Start: 2022-12-08 | End: 2023-01-07

## 2022-12-09 ENCOUNTER — PATIENT MESSAGE (OUTPATIENT)
Dept: PULMONOLOGY | Age: 72
End: 2022-12-09

## 2022-12-09 NOTE — TELEPHONE ENCOUNTER
From: Yoanna De La Torre  To: Dr. Karen Cardoza: 2022 10:15 AM EST  Subject: Thank you    Thank you for the antibiotic. I don't know if it was the antibiotic or the new Trelegy but I am not wheezing anymore. Can you send over a prescription for the Trelegy to the [address] [place] [address]? I would really appreciate it. Thanks again to you and Munir Sweeney.

## 2022-12-12 RX ORDER — FLUTICASONE FUROATE, UMECLIDINIUM BROMIDE AND VILANTEROL TRIFENATATE 100; 62.5; 25 UG/1; UG/1; UG/1
1 POWDER RESPIRATORY (INHALATION) DAILY
Qty: 1 EACH | Refills: 6 | Status: SHIPPED | OUTPATIENT
Start: 2022-12-12

## 2023-01-20 DIAGNOSIS — F41.9 ANXIETY: ICD-10-CM

## 2023-01-20 RX ORDER — ALPRAZOLAM 0.5 MG/1
TABLET ORAL
Qty: 90 TABLET | Refills: 0 | Status: SHIPPED | OUTPATIENT
Start: 2023-01-20 | End: 2023-04-20

## 2023-01-20 NOTE — TELEPHONE ENCOUNTER
Recent Visits  Date Type Provider Dept   10/12/22 Office Visit Michael Nicole MD [facility]   09/19/22 Office Visit Michael Nicole MD [facility]   05/20/22 Office Visit Michael Nicole MD [facility]   05/05/22 Appointment MD [facility]   09/01/21 Office Visit Michael Nicole MD [facility]   Showing recent visits within past 540 days with a meds authorizing provider and meeting all other requirements  Future Appointments  Date Type Provider Dept   02/20/23 Appointment Michael Nicole MD [facility]   Showing future appointments within next 150 days with a meds authorizing provider and meeting all other requirements     10/12/2022

## 2023-02-02 LAB
PARATHYROID HORMONE INTACT: 57.6 PG/ML (ref 12–88)
T4 FREE: 1.26 NG/DL (ref 0.61–1.12)
TSH ULTRASENSITIVE: 0.18 UIU/ML (ref 0.45–5.33)

## 2023-02-03 ENCOUNTER — TELEPHONE (OUTPATIENT)
Dept: ENDOCRINOLOGY | Age: 73
End: 2023-02-03

## 2023-02-03 ENCOUNTER — PATIENT MESSAGE (OUTPATIENT)
Dept: ENDOCRINOLOGY | Age: 73
End: 2023-02-03

## 2023-02-03 NOTE — TELEPHONE ENCOUNTER
Myows message sent from patient:    I had my blood work done yesterday,  it looks like my numbers are slightly off let me know what doctor Linette Mayorga wants me to do .   thank you

## 2023-02-06 NOTE — TELEPHONE ENCOUNTER
Please advise patient blood work shows that her numbers are becoming hyperthyroid again hopefully she is taking her Tapazole regularly if she has been then we can increase the dose from 5 mg to 7.5 mg daily.

## 2023-02-20 ENCOUNTER — OFFICE VISIT (OUTPATIENT)
Dept: INTERNAL MEDICINE CLINIC | Age: 73
End: 2023-02-20

## 2023-02-20 VITALS
WEIGHT: 123 LBS | DIASTOLIC BLOOD PRESSURE: 72 MMHG | HEART RATE: 101 BPM | BODY MASS INDEX: 24.02 KG/M2 | TEMPERATURE: 97.5 F | SYSTOLIC BLOOD PRESSURE: 130 MMHG | OXYGEN SATURATION: 98 %

## 2023-02-20 DIAGNOSIS — E11.59 HYPERTENSION ASSOCIATED WITH DIABETES (HCC): Primary | ICD-10-CM

## 2023-02-20 DIAGNOSIS — E05.90 HYPERTHYROIDISM: ICD-10-CM

## 2023-02-20 DIAGNOSIS — C34.2 MALIGNANT NEOPLASM OF MIDDLE LOBE OF RIGHT LUNG (HCC): ICD-10-CM

## 2023-02-20 DIAGNOSIS — Z87.891 PERSONAL HISTORY OF TOBACCO USE: ICD-10-CM

## 2023-02-20 DIAGNOSIS — F33.0 MILD EPISODE OF RECURRENT MAJOR DEPRESSIVE DISORDER (HCC): ICD-10-CM

## 2023-02-20 DIAGNOSIS — I15.2 HYPERTENSION ASSOCIATED WITH DIABETES (HCC): Primary | ICD-10-CM

## 2023-02-20 DIAGNOSIS — J43.2 CENTRILOBULAR EMPHYSEMA (HCC): ICD-10-CM

## 2023-02-20 DIAGNOSIS — Z12.31 ENCOUNTER FOR SCREENING MAMMOGRAM FOR MALIGNANT NEOPLASM OF BREAST: ICD-10-CM

## 2023-02-20 LAB — HBA1C MFR BLD: 14 %

## 2023-02-20 RX ORDER — INSULIN GLARGINE 100 [IU]/ML
30 INJECTION, SOLUTION SUBCUTANEOUS NIGHTLY
Qty: 5 ADJUSTABLE DOSE PRE-FILLED PEN SYRINGE | Refills: 3 | Status: SHIPPED | OUTPATIENT
Start: 2023-02-20

## 2023-02-20 RX ORDER — PEN NEEDLE, DIABETIC 31 G X1/4"
1 NEEDLE, DISPOSABLE MISCELLANEOUS DAILY
Qty: 300 EACH | Refills: 3 | Status: SHIPPED | OUTPATIENT
Start: 2023-02-20

## 2023-02-20 RX ORDER — INSULIN LISPRO 100 [IU]/ML
5 INJECTION, SOLUTION INTRAVENOUS; SUBCUTANEOUS
Qty: 3 ADJUSTABLE DOSE PRE-FILLED PEN SYRINGE | Refills: 3 | Status: SHIPPED | OUTPATIENT
Start: 2023-02-20

## 2023-02-20 SDOH — ECONOMIC STABILITY: HOUSING INSECURITY
IN THE LAST 12 MONTHS, WAS THERE A TIME WHEN YOU DID NOT HAVE A STEADY PLACE TO SLEEP OR SLEPT IN A SHELTER (INCLUDING NOW)?: NO

## 2023-02-20 SDOH — ECONOMIC STABILITY: INCOME INSECURITY: HOW HARD IS IT FOR YOU TO PAY FOR THE VERY BASICS LIKE FOOD, HOUSING, MEDICAL CARE, AND HEATING?: VERY HARD

## 2023-02-20 SDOH — ECONOMIC STABILITY: FOOD INSECURITY: WITHIN THE PAST 12 MONTHS, YOU WORRIED THAT YOUR FOOD WOULD RUN OUT BEFORE YOU GOT MONEY TO BUY MORE.: SOMETIMES TRUE

## 2023-02-20 SDOH — ECONOMIC STABILITY: FOOD INSECURITY: WITHIN THE PAST 12 MONTHS, THE FOOD YOU BOUGHT JUST DIDN'T LAST AND YOU DIDN'T HAVE MONEY TO GET MORE.: SOMETIMES TRUE

## 2023-02-20 ASSESSMENT — PATIENT HEALTH QUESTIONNAIRE - PHQ9
SUM OF ALL RESPONSES TO PHQ9 QUESTIONS 1 & 2: 4
8. MOVING OR SPEAKING SO SLOWLY THAT OTHER PEOPLE COULD HAVE NOTICED. OR THE OPPOSITE, BEING SO FIGETY OR RESTLESS THAT YOU HAVE BEEN MOVING AROUND A LOT MORE THAN USUAL: 0
9. THOUGHTS THAT YOU WOULD BE BETTER OFF DEAD, OR OF HURTING YOURSELF: 0
4. FEELING TIRED OR HAVING LITTLE ENERGY: 3
SUM OF ALL RESPONSES TO PHQ QUESTIONS 1-9: 11
6. FEELING BAD ABOUT YOURSELF - OR THAT YOU ARE A FAILURE OR HAVE LET YOURSELF OR YOUR FAMILY DOWN: 0
SUM OF ALL RESPONSES TO PHQ QUESTIONS 1-9: 11
3. TROUBLE FALLING OR STAYING ASLEEP: 0
5. POOR APPETITE OR OVEREATING: 2
1. LITTLE INTEREST OR PLEASURE IN DOING THINGS: 2
7. TROUBLE CONCENTRATING ON THINGS, SUCH AS READING THE NEWSPAPER OR WATCHING TELEVISION: 2
SUM OF ALL RESPONSES TO PHQ QUESTIONS 1-9: 11
10. IF YOU CHECKED OFF ANY PROBLEMS, HOW DIFFICULT HAVE THESE PROBLEMS MADE IT FOR YOU TO DO YOUR WORK, TAKE CARE OF THINGS AT HOME, OR GET ALONG WITH OTHER PEOPLE: 0
2. FEELING DOWN, DEPRESSED OR HOPELESS: 2
SUM OF ALL RESPONSES TO PHQ QUESTIONS 1-9: 11

## 2023-02-20 NOTE — PROGRESS NOTES
Chief Complaint   Patient presents with    Shoulder Pain    Diabetes       Assessment/Plan:  John Mayorga was seen today for shoulder pain and diabetes. Diagnoses and all orders for this visit:    Hypertension associated with diabetes ([facility] [address]  -     insulin glargine (LANTUS SOLOSTAR) 100 UNIT/ML injection pen; Inject 30 Units into the skin nightly  -     insulin lispro, 1 Unit Dial, (HUMALOG KWIKPEN) 100 UNIT/ML SOPN; Inject 5 Units into the skin 3 times daily (before meals)    Malignant neoplasm of middle lobe of right lung [facility])  Comments:  She is seeing oncology. Dr. Laly Weber. Centrilobular emphysema ([facility] [address]  Comments:  She is taking Albuterol and Trelegy. She is a stage E on the cCAT scale. Mild episode of recurrent major depressive disorder ([facility] [address]  Comments:  She is on Wellbutrin and Xanax. She says it is working. She does not want to adjust the dose. Hyperthyroidism  Comments:  She is seeing Dr. Bertell Curling. Encounter for screening mammogram for malignant neoplasm of breast  -     SHANNON DIGITAL SCREEN W OR WO CAD BILATERAL; Future    Personal history of tobacco use  Comments:  She is a victim of 2nd hand smoke now. Orders:  -     AK VISIT TO DISCUSS LUNG CA SCREEN W LDCT  -     CT Lung Screen (Annual/Baseline); Future    Does patient have diagnosis of COPD? Yes    Has the patient had >2 exacerbations within 1 year?  no    COPD Assessment tool:  I never cough 1 I cough all the time  I have no phlegm 3 My chest in completely full of phlegm  My chest does not feel tight at all 1 My chest feels very tight  When I walk up one flight of stairs or a hill, I am not breathless 5 When I walk up one flight of stairs or a hill, I am very breathless  I am not limited in any activities at home 5 I am very limited doing activities at home  Confidence when leaving home w/ lung condition 1 No confidence when leaving home w/ lung condition  I sleep soundly 1 I don't sleep sound due to lung condition  I have lots of energy 4 I have no energy at all    Total score:  21    mMRC Dyspnea Scale:              GOLD COPD Severity  (A,B,or E):  E    Vitals:    02/20/23 1005   BP: 130/72   Pulse: (!) 101   Temp: 97.5 °F (36.4 °C)   SpO2: 98%     Physical Exam  Vitals reviewed. Constitutional:       General: She is not in acute distress. Appearance: She is well-developed. She is not diaphoretic. HENT:      Head: Normocephalic and atraumatic. Cardiovascular:      Rate and Rhythm: Normal rate and regular rhythm. Pulses: Normal pulses. Heart sounds: Normal heart sounds. No murmur heard. No friction rub. No gallop. Pulmonary:      Effort: Pulmonary effort is normal. No respiratory distress. Breath sounds: No stridor. Wheezing and rales present. Chest:      Chest wall: No tenderness. Neurological:      Mental Status: She is alert and oriented to person, place, and time. Cranial Nerves: No cranial nerve deficit. Psychiatric:         Behavior: Behavior normal.         Thought Content: Thought content normal.         Judgment: Judgment normal.       PHQ Scores 2/20/2023 10/12/2022 9/19/2022 5/20/2022 5/20/2022 9/1/2021 4/5/2021   PHQ2 Score 4 6 2 0 0 2 4   PHQ9 Score 11 16 7 1 0 2 19     Interpretation of Total Score Depression Severity: 1-4 = Minimal depression, 5-9 = Mild depression, 10-14 = Moderate depression, 15-19 = Moderately severe depression, 20-27 = Severe depression    Malcolm Campos MD  FACPDiscussed with the patient the current USPSTF guidelines released March 9, 2021 for screening for lung cancer. For adults aged 48 to [de-identified] years who have a 20 pack-year smoking history and currently smoke or have quit within the past 15 years the grade B recommendation is to:  Screen for lung cancer with low-dose computed tomography (LDCT) every year. Stop screening once a person has not smoked for 15 years or has a health problem that limits life expectancy or the ability to have lung surgery.     The patient reports that she quit smoking about 8 years ago. Her smoking use included cigarettes. She has a 52.50 pack-year smoking history. She has never used smokeless tobacco.. Discussed with patient the risks and benefits of screening, including over-diagnosis, false positive rate, and total radiation exposure. The patient currently exhibits no signs or symptoms suggestive of lung cancer. Discussed with patient the importance of compliance with yearly annual lung cancer screenings and willingness to undergo diagnosis and treatment if screening scan is positive. In addition, the patient was counseled regarding the importance of remaining smoke free and/or total smoking cessation.     Also reviewed the following if the patient has Medicare that as of February 10, 2022, Medicare only covers LDCT screening in patients aged 51-72 with at least a 20 pack-year smoking history who currently smoke or have quit in the last 15 years

## 2023-02-20 NOTE — PATIENT INSTRUCTIONS

## 2023-04-05 NOTE — PROGRESS NOTES
[facility]  Neurology Progress Note      Gustavo Faria  [id number]  04/05/2023    Interval events and subjective:  Pt intermittently A&O x1 (name) overnight. Occasionally staying he is at [facility]. Majority of night he repeated numbers and phrases. EEG done overnight pending. This morning pt responded appropriately to more than half of the questions. He did intermittently repeat random numbers when asked about date. He was able to tell me his full name and where he was at. He had to take pauses after each word while answering questions. He followed more commands this morning and was able to lift his arms and wiggle toes when asked. States his headache is worse today than yesterday.      Objective:  Physical Examination   Vitals: BP (!) 154/96 (BP Location: Right arm)   Pulse 80   Temp 97.9  F (36.6  C) (Oral)   Resp 20   Wt 68.6 kg (151 lb 3.8 oz)   SpO2 98%   BMI 24.41 kg/m    General: lying in bed, NAD  HEENT: NC/AT  Cardiac: RRR  Chest: non-labored on RA  Abdomen: ND  Extremities: Warm, no edema  Neuro:  Mental status: Awake, alert, occasionally attentive, oriented to self and place. Not oriented to time. Language is broken. Pt taking breaks after each word. Able to comprehend and follow simple commands. Does not follow complex commands or naming. Was able to repeat sentences.   Cranial nerves: VFF - unable to assess, PERRL, EOMI- unable to assess, facial sensation intact, face symmetric, shoulder shrug -unable to assess, tongue midline  Motor: Normal bulk. No rigidity. Improved tone. No abnormal movements. At least 4/5 strength in LUE. At last 3/5 strength in RUE. Not lifting b/l LE. Able to wiggle toes, stick out tongue, and lift arms on commands.   Reflexes: symmetric biceps, brachioradialis, patellae, and achilles reflexes.no clonus, toes down-going.  Sensory: Intact to light touch  Coordination: FNF, HS, and Rapid alternating movements -unable to assess.   Gait:  [facility] [id number]  Progress Note  Seema Sylvester MD       [address] [place] [address]   YOB: 1950    Date of Visit:  5/10/2022    Assessment/Plan:  1. COPD, moderate (HCC)/centrilobular emphysema  PFT 5/16:  INTERPRETATION: Spirometry showed decreased FVC of 1.86 L, 77% predicted and  decreased   FEV-1 of 1.16 L, 60% predicted. FEV-1/FVC ratio was decreased at 63%. No  response to bronchodilators demonstrated on spirometry. Lung volumes showed  increased total lung capacity of 141% predicted and residual volume of 241%  predicted. Diffusion capacity showed   decreased DLCO of 66% predicted.       IMPRESSION: Moderate obstructive defect on spirometry with no response to  bronchodilators. Both air trapping and hyperinflation were present on lung  volumes and moderate decrease in diffusion capacity. In comparison to the  test that was done in February of 2015, [facility] has decreased by 11%, FEV-1 by  23% with no significant change in total lung capacity or DLCO. Medication Management:  The patient benefits from the medical regimen and reports no complications. · Breo   · Albuterol  · Cost is an issue for her     She is flared up right now likely 2/2 spring weather. Give her Prednisone taper -- 40mg x 3 days; 30 mg x 3 days; 20 mg x 3 days; 10 mg x 3 days    2. Malignant neoplasm of upper lobe of right lung (HCC)  · S/p RULobectomy  CT chest 3/22:  [IMPRESSION]   1.  Overall no significant interval change when compared with the prior CT    of the chest from 10/9/2020.   2.  Stable appearance of a solid noncalcified soft tissue pulmonary nodule    along the pleural surface of the hyperinflated right lower lobe measuring    4 mm. 3.  Status post partial resection of the right lung and placement of a    port catheter. · This revealed evidence of a small pulmonary nodule in the right upper lobe about 3 mm in diameter.   · Additionally, CT imaging revealed evidence of "Deferred.        Pertinent Studies   Overnight EEG - report pending   1st hour reviewed by Dr. Erwin and shows \"nearly continuous, variably irregular or rhythmic, generalized delta slowing, with intermixture of faster theta-alpha activities bilaterally\"     I have personally reviewed most recent and pertinent labs, tests, and radiological images.     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 Neurology was consulted to evaluate encephalopathy. This is most consistent w/ toxic metabolic encephalopathy in setting of ACTH secreting tumor c/b Cushing's disease, central hypothyroidism, and central hypogonadism, ongoing infection, and delirium. Seems to be improving with medical care. EEG not consistent w/ non-convulsive status and seizures. Final EEG report pending. Will sign off at this time. Suspect mentation will continue to improve with improvement of infection, surgical or radiation intervention of tumor, and endocrinology recommendations.     General Neurology will sign off at this time.   Thank you for involving Neurology in the care of Gustavo Faria.  Please do not hesitate to call with questions/concerns (consult pager [phone]).        Patient was seen and discussed with attending Dr. Inman.    Leigh Jackson MD   Internal Medicine, PGY-1                  " centrilobular emphysema  · Repeat CT chest around 3/23    4. Chronic hypoxemic respiratory failure  Oxygen saturation on room air with exertion in the office today is 81%. The patient would benefit from supplemental oxygen with exertion and sleep  · The patient is independently mobile within the home and would benefit from a portable oxygen concentrator  · She should be on oxygen 24 hours/day. She is doing better with this. Recommend COVID vaccine -- She still has not gotten this  Also declines the flu shot      FOLLOW UP: 6 months      Chief Complaint   Patient presents with    Shortness of Breath     6 month CT done       HPI  The patient presents with a chief complaint of moderate shortness of breath related to Severe COPD of many years duration. He has mild associated cough. Exertion is a modifying factor. She has been wheezing over the last one week. No purulent sputum, fever or chill. Additionally, the patient does have a history of prior lung cancer surgery with right upper lobectomy. She is wearing O2 to the office today. She had lost weight which turns out was 2/2 thyroid issue. Review of Systems  No Chest pain, Nausea or vomiting reported    Physical Exam:  Well developed, well nourished  Alert and oriented  Sclera is clear  No cervical adenopathy  No JVD. Chest examination is diffuse wheezes. Cardiac examination reveals regular rate and rhythm without murmur, gallop or rub. The abdomen is soft, nontender and nondistended. There is no clubbing, cyanosis or edema of the extremities. There is no obvious skin rash. No focal neuro deficicts  Normal mood and affect    Allergies   Allergen Reactions    Codeine Anxiety     Prior to Visit Medications    Medication Sig Taking?  Authorizing Provider   atorvastatin (LIPITOR) 20 MG tablet Take 1 tablet by mouth daily  Lizy Michaud MD   ALPRAZolam Chasejames Colemani) 0.5 MG tablet TAKE 1 TABLET BY MOUTH THREE TIMES DAILY AS NEEDED  Lizy Michaud MD methIMAzole (TAPAZOLE) 5 MG tablet TAKE 1 TABLET(5 MG) BY MOUTH THREE TIMES DAILY  Jamie Simons MD   buPROPion (WELLBUTRIN SR) 150 MG extended release tablet TAKE 1 TABLET BY MOUTH TWICE DAILY  Michael Nicole MD   albuterol sulfate  (90 Base) MCG/ACT inhaler Inhale 2 puffs into the lungs every 6 hours as needed for Wheezing  Golden Castorena MD   metoprolol succinate (TOPROL XL) 50 MG extended release tablet metoprolol succinate ER 50 mg tablet,extended release 24 hr   TAKE 1 TABLET BY MOUTH EVERY DAY  Historical Provider, MD   fluticasone-vilanterol (BREO ELLIPTA) 100-25 MCG/INH AEPB inhaler Inhale 1 puff into the lungs daily  Golden Castorena MD   melatonin 3 MG TABS tablet Take 3 mg by mouth nightly as needed  Historical Provider, MD   oxyCODONE (ROXICODONE) 5 MG immediate release tablet Take 5 mg by mouth every 4 hours as needed for Pain (Oxycodone 5 mg Immediately Q 4-6 Hours PRN). Takes for cancer pain (phantom pain) where right rib lobectomy pain  Historical Provider, MD   Glucose Blood (ACCU-CHEK ANA LAURA PLUS VI) 1 each by In Vitro route 4 times daily Indications: Please dispense Accu-Chek Ana Laura Plus Test Strips with DX code E11.9   Historical Provider, MD   aspirin 81 MG chewable tablet Take 81 mg by mouth daily Indications: OTC  Historical Provider, MD       There were no vitals filed for this visit. There is no height or weight on file to calculate BMI.      Wt Readings from Last 3 Encounters:   03/10/22 117 lb 9.6 oz (53.3 kg)   21 88 lb 6.4 oz (40.1 kg)   21 93 lb (42.2 kg)     BP Readings from Last 3 Encounters:   03/10/22 (!) 162/93   22 (!) 136/94   21 (!) 116/58        Social History     Tobacco Use   Smoking Status Former Smoker    Packs/day: 1.50    Years: 35.00    Pack years: 52.50    Types: Cigarettes    Quit date: 2015    Years since quittin.3   Smokeless Tobacco Never Used

## 2023-04-06 DIAGNOSIS — F41.9 ANXIETY: ICD-10-CM

## 2023-04-06 RX ORDER — ALPRAZOLAM 0.5 MG/1
0.5 TABLET ORAL 3 TIMES DAILY PRN
Qty: 90 TABLET | Refills: 0 | OUTPATIENT
Start: 2023-04-06 | End: 2023-07-05

## 2023-04-07 LAB
A/G RATIO: 1.1 (ref 1–2)
ALBUMIN SERPL-MCNC: 4.1 G/DL (ref 3.5–5.7)
ALBUMIN/PROTEIN TOTAL, SER/PL: 7.8 G/DL (ref 6–8.3)
ALP BLD-CCNC: 105 U/L (ref 34–104)
ALT SERPL-CCNC: 20 U/L (ref 7–52)
ANION GAP 4: 9 MMOL/L (ref 7–16)
AST W/O P-5'-P: 15 U/L (ref 13–39)
BILIRUB SERPL-MCNC: 0.3 MG/DL (ref 0.3–1)
BUN BLDV-MCNC: 24 MG/DL (ref 7–25)
CALCIUM SERPL-MCNC: 9.6 MG/DL (ref 8.6–10.2)
CHLORIDE BLD-SCNC: 105 MMOL/L (ref 98–107)
CO2: 28 MMOL/L (ref 21–31)
CREAT SERPL-MCNC: 1.08 MG/DL (ref 0.6–1.2)
EGFR FEMALE: 54 ML/MIN/1.73M2
GLOBULIN: 3.7 G/DL (ref 2.6–4.2)
GLUCOSE: 246 MG/DL (ref 74–109)
PARATHYROID HORMONE INTACT: 38.5 PG/ML (ref 12–88)
POTASSIUM SERPL-SCNC: 4.5 MMOL/L (ref 3.5–5.1)
SODIUM BLD-SCNC: 142 MMOL/L (ref 136–145)
T4 FREE: 0.69 NG/DL (ref 0.61–1.12)
TSH ULTRASENSITIVE: 4.45 UIU/ML (ref 0.45–5.33)

## 2023-04-17 RX ORDER — ATORVASTATIN CALCIUM 20 MG/1
TABLET, FILM COATED ORAL
Qty: 90 TABLET | Refills: 0 | Status: SHIPPED | OUTPATIENT
Start: 2023-04-17

## 2023-04-17 NOTE — TELEPHONE ENCOUNTER
Recent Visits  Date Type Provider Dept   02/20/23 Office Visit Ba Sarmiento MD [facility]   10/12/22 Office Visit Ba Sarmiento MD [facility]   09/19/22 Office Visit Ba Sarmiento MD [facility]   05/20/22 Office Visit Ba Sarmiento MD [facility]   Showing recent visits within past 540 days with a meds authorizing provider and meeting all other requirements  Future Appointments  No visits were found meeting these conditions.   Showing future appointments within next 150 days with a meds authorizing provider and meeting all other requirements     2/20/2023

## 2023-05-03 ENCOUNTER — OFFICE VISIT (OUTPATIENT)
Dept: INTERNAL MEDICINE CLINIC | Age: 73
End: 2023-05-03

## 2023-05-03 VITALS
DIASTOLIC BLOOD PRESSURE: 82 MMHG | TEMPERATURE: 96.9 F | OXYGEN SATURATION: 98 % | WEIGHT: 136 LBS | SYSTOLIC BLOOD PRESSURE: 138 MMHG | HEART RATE: 88 BPM | BODY MASS INDEX: 26.56 KG/M2

## 2023-05-03 DIAGNOSIS — E11.59 HYPERTENSION ASSOCIATED WITH DIABETES (HCC): ICD-10-CM

## 2023-05-03 DIAGNOSIS — I15.2 HYPERTENSION ASSOCIATED WITH DIABETES (HCC): ICD-10-CM

## 2023-05-03 DIAGNOSIS — F33.1 MODERATE EPISODE OF RECURRENT MAJOR DEPRESSIVE DISORDER (HCC): ICD-10-CM

## 2023-05-03 DIAGNOSIS — E11.69 HYPERLIPIDEMIA DUE TO TYPE 2 DIABETES MELLITUS (HCC): Primary | ICD-10-CM

## 2023-05-03 DIAGNOSIS — E78.5 HYPERLIPIDEMIA DUE TO TYPE 2 DIABETES MELLITUS (HCC): Primary | ICD-10-CM

## 2023-05-03 LAB — HBA1C MFR BLD: 12.2 %

## 2023-05-03 RX ORDER — PROCHLORPERAZINE 25 MG/1
SUPPOSITORY RECTAL
Qty: 2 EACH | Refills: 3 | Status: SHIPPED | OUTPATIENT
Start: 2023-05-03

## 2023-05-03 RX ORDER — PROCHLORPERAZINE 25 MG/1
SUPPOSITORY RECTAL
Qty: 1 EACH | Refills: 0 | Status: SHIPPED | OUTPATIENT
Start: 2023-05-03

## 2023-05-03 ASSESSMENT — PATIENT HEALTH QUESTIONNAIRE - PHQ9
8. MOVING OR SPEAKING SO SLOWLY THAT OTHER PEOPLE COULD HAVE NOTICED. OR THE OPPOSITE, BEING SO FIGETY OR RESTLESS THAT YOU HAVE BEEN MOVING AROUND A LOT MORE THAN USUAL: 1
SUM OF ALL RESPONSES TO PHQ QUESTIONS 1-9: 9
2. FEELING DOWN, DEPRESSED OR HOPELESS: 2
SUM OF ALL RESPONSES TO PHQ9 QUESTIONS 1 & 2: 3
SUM OF ALL RESPONSES TO PHQ QUESTIONS 1-9: 9
1. LITTLE INTEREST OR PLEASURE IN DOING THINGS: 1
7. TROUBLE CONCENTRATING ON THINGS, SUCH AS READING THE NEWSPAPER OR WATCHING TELEVISION: 1
10. IF YOU CHECKED OFF ANY PROBLEMS, HOW DIFFICULT HAVE THESE PROBLEMS MADE IT FOR YOU TO DO YOUR WORK, TAKE CARE OF THINGS AT HOME, OR GET ALONG WITH OTHER PEOPLE: 0
5. POOR APPETITE OR OVEREATING: 2
6. FEELING BAD ABOUT YOURSELF - OR THAT YOU ARE A FAILURE OR HAVE LET YOURSELF OR YOUR FAMILY DOWN: 0
3. TROUBLE FALLING OR STAYING ASLEEP: 0
SUM OF ALL RESPONSES TO PHQ QUESTIONS 1-9: 9
SUM OF ALL RESPONSES TO PHQ QUESTIONS 1-9: 9
9. THOUGHTS THAT YOU WOULD BE BETTER OFF DEAD, OR OF HURTING YOURSELF: 0
4. FEELING TIRED OR HAVING LITTLE ENERGY: 2

## 2023-05-03 NOTE — PROGRESS NOTES
5/3/2023 2/20/2023 10/12/2022 9/19/2022 5/20/2022 5/20/2022 9/1/2021   PHQ2 Score 3 4 6 2 0 0 2   PHQ9 Score 9 11 16 7 1 0 2     Interpretation of Total Score Depression Severity: 1-4 = Minimal depression, 5-9 = Mild depression, 10-14 = Moderate depression, 15-19 = Moderately severe depression, 20-27 = Severe depression    MD Rahul Virki Pedro

## 2023-05-04 DIAGNOSIS — E11.69 HYPERLIPIDEMIA DUE TO TYPE 2 DIABETES MELLITUS (HCC): Primary | ICD-10-CM

## 2023-05-04 DIAGNOSIS — E11.59 HYPERTENSION ASSOCIATED WITH DIABETES (HCC): ICD-10-CM

## 2023-05-04 DIAGNOSIS — E78.5 HYPERLIPIDEMIA DUE TO TYPE 2 DIABETES MELLITUS (HCC): Primary | ICD-10-CM

## 2023-05-04 DIAGNOSIS — I15.2 HYPERTENSION ASSOCIATED WITH DIABETES (HCC): ICD-10-CM

## 2023-05-08 ENCOUNTER — OFFICE VISIT (OUTPATIENT)
Dept: PULMONOLOGY | Age: 73
End: 2023-05-08
Payer: COMMERCIAL

## 2023-05-08 VITALS — OXYGEN SATURATION: 94 % | HEART RATE: 84 BPM

## 2023-05-08 DIAGNOSIS — Z85.118 HISTORY OF LUNG CANCER: ICD-10-CM

## 2023-05-08 DIAGNOSIS — Z87.891 PERSONAL HISTORY OF TOBACCO USE, PRESENTING HAZARDS TO HEALTH: ICD-10-CM

## 2023-05-08 DIAGNOSIS — J43.2 CENTRILOBULAR EMPHYSEMA (HCC): ICD-10-CM

## 2023-05-08 DIAGNOSIS — J44.9 COPD, MODERATE (HCC): Primary | ICD-10-CM

## 2023-05-08 DIAGNOSIS — J96.11 CHRONIC HYPOXEMIC RESPIRATORY FAILURE (HCC): ICD-10-CM

## 2023-05-08 PROCEDURE — G8417 CALC BMI ABV UP PARAM F/U: HCPCS | Performed by: INTERNAL MEDICINE

## 2023-05-08 PROCEDURE — 99214 OFFICE O/P EST MOD 30 MIN: CPT | Performed by: INTERNAL MEDICINE

## 2023-05-08 PROCEDURE — 3017F COLORECTAL CA SCREEN DOC REV: CPT | Performed by: INTERNAL MEDICINE

## 2023-05-08 PROCEDURE — 1123F ACP DISCUSS/DSCN MKR DOCD: CPT | Performed by: INTERNAL MEDICINE

## 2023-05-08 PROCEDURE — 3023F SPIROM DOC REV: CPT | Performed by: INTERNAL MEDICINE

## 2023-05-08 PROCEDURE — G8427 DOCREV CUR MEDS BY ELIG CLIN: HCPCS | Performed by: INTERNAL MEDICINE

## 2023-05-08 PROCEDURE — 1090F PRES/ABSN URINE INCON ASSESS: CPT | Performed by: INTERNAL MEDICINE

## 2023-05-08 PROCEDURE — G8399 PT W/DXA RESULTS DOCUMENT: HCPCS | Performed by: INTERNAL MEDICINE

## 2023-05-08 PROCEDURE — 1036F TOBACCO NON-USER: CPT | Performed by: INTERNAL MEDICINE

## 2023-05-08 RX ORDER — FLUTICASONE FUROATE, UMECLIDINIUM BROMIDE AND VILANTEROL TRIFENATATE 100; 62.5; 25 UG/1; UG/1; UG/1
1 POWDER RESPIRATORY (INHALATION) DAILY
Qty: 1 EACH | Refills: 6 | Status: SHIPPED | OUTPATIENT
Start: 2023-05-08

## 2023-05-08 NOTE — PROGRESS NOTES
[facility]  Progress Note  Twyla Sandoval MD       [place]   YOB: 1950    Date of Visit:  5/8/2023    Assessment/Plan:  1. COPD, moderate (HCC)/centrilobular emphysema  PFT 5/16:  INTERPRETATION: Spirometry showed decreased FVC of 1.86 L, 77% predicted and  decreased   FEV-1 of 1.16 L, 60% predicted. FEV-1/FVC ratio was decreased at 63%. No  response to bronchodilators demonstrated on spirometry. Lung volumes showed  increased total lung capacity of 141% predicted and residual volume of 241%  predicted. Diffusion capacity showed   decreased DLCO of 66% predicted. IMPRESSION: Moderate obstructive defect on spirometry with no response to  bronchodilators. Both air trapping and hyperinflation were present on lung  volumes and moderate decrease in diffusion capacity. In comparison to the  test that was done in February of 2015, [facility] has decreased by 11%, FEV-1 by  23% with no significant change in total lung capacity or DLCO. Medication Management:  The patient benefits from the medical regimen and reports no complications. Trelegy ==> Has really helped (Breo, not helpful)  Albuterol      2. Malignant neoplasm of upper lobe of right lung [facility])  S/p RULobectomy  CT chest 9/22:     FINDINGS:   CT CHEST:       Thyroid: Mild prominence of the thyroid. Vasculature: The thoracic aorta and arch vasculature have a normal    noncontrasted appearance and are normal size and contour. The coronary    arteries are grossly normal caliber without focal abnormality. Atherosclerosis of the thoracic aorta. Heart: The heart size is normal.  There is no evidence for pericardial    effusion. Mediastinum: No pathologically enlarged mediastinal lymph nodes are seen. No focal mass lesions are identified. There is no evidence for hiatal    hernia. Pleura: There is no evidence for pleural effusion or focal pleural    thickening.

## 2023-05-10 ENCOUNTER — TELEPHONE (OUTPATIENT)
Dept: PULMONOLOGY | Age: 73
End: 2023-05-10

## 2023-05-10 NOTE — TELEPHONE ENCOUNTER
Patient called and wanted to make sure that CT order went to [facility] for her to schedule scan.      Cal[address]: [phone]

## 2023-05-10 NOTE — TELEPHONE ENCOUNTER
Pt informed order was sent  and then they sent us a form to fill out which we di and faxed back to them so she should be all set.

## 2023-06-08 DIAGNOSIS — F33.0 MAJOR DEPRESSIVE DISORDER, RECURRENT EPISODE, MILD (HCC): Chronic | ICD-10-CM

## 2023-06-08 RX ORDER — BUPROPION HYDROCHLORIDE 150 MG/1
TABLET, EXTENDED RELEASE ORAL
Qty: 180 TABLET | Refills: 1 | Status: SHIPPED | OUTPATIENT
Start: 2023-06-08

## 2023-07-06 DIAGNOSIS — F41.9 ANXIETY: ICD-10-CM

## 2023-07-06 RX ORDER — ALPRAZOLAM 0.5 MG/1
TABLET ORAL
Qty: 90 TABLET | Refills: 0 | Status: SHIPPED | OUTPATIENT
Start: 2023-07-06 | End: 2023-08-05

## 2023-07-06 NOTE — TELEPHONE ENCOUNTER
Recent Visits  Date Type Provider Dept   05/03/23 Office Visit Elisa Oconnor MD [facility]   02/20/23 Office Visit Elisa Oconnor MD [facility]   10/12/22 Office Visit Elisa Oconnor MD [facility]   09/19/22 Office Visit Elisa Oconnor MD [facility]   05/20/22 Office Visit Elisa Oconnor MD [facility]   Showing recent visits within past 540 days with a meds authorizing provider and meeting all other requirements  Future Appointments  Date Type Provider Dept   08/11/23 Appointment Elisa Oconnor MD [facility]   Showing future appointments within next 150 days with a meds authorizing provider and meeting all other requirements     5/3/2023

## 2023-07-20 RX ORDER — ATORVASTATIN CALCIUM 20 MG/1
TABLET, FILM COATED ORAL
Qty: 90 TABLET | Refills: 1 | Status: SHIPPED | OUTPATIENT
Start: 2023-07-20

## 2023-07-26 DIAGNOSIS — E11.69 HYPERLIPIDEMIA DUE TO TYPE 2 DIABETES MELLITUS (HCC): ICD-10-CM

## 2023-07-26 DIAGNOSIS — E11.59 HYPERTENSION ASSOCIATED WITH DIABETES (HCC): ICD-10-CM

## 2023-07-26 DIAGNOSIS — I15.2 HYPERTENSION ASSOCIATED WITH DIABETES (HCC): ICD-10-CM

## 2023-07-26 DIAGNOSIS — E78.5 HYPERLIPIDEMIA DUE TO TYPE 2 DIABETES MELLITUS (HCC): ICD-10-CM

## 2023-07-26 RX ORDER — PROCHLORPERAZINE 25 MG/1
SUPPOSITORY RECTAL
Qty: 1 EACH | Refills: 0 | Status: SHIPPED | OUTPATIENT
Start: 2023-07-26

## 2023-08-11 ENCOUNTER — OFFICE VISIT (OUTPATIENT)
Dept: INTERNAL MEDICINE CLINIC | Age: 73
End: 2023-08-11

## 2023-08-11 VITALS
TEMPERATURE: 97.7 F | HEART RATE: 90 BPM | WEIGHT: 141 LBS | SYSTOLIC BLOOD PRESSURE: 140 MMHG | OXYGEN SATURATION: 93 % | DIASTOLIC BLOOD PRESSURE: 74 MMHG | BODY MASS INDEX: 27.54 KG/M2

## 2023-08-11 DIAGNOSIS — E78.5 HYPERLIPIDEMIA DUE TO TYPE 2 DIABETES MELLITUS (HCC): Primary | ICD-10-CM

## 2023-08-11 DIAGNOSIS — E11.69 HYPERLIPIDEMIA DUE TO TYPE 2 DIABETES MELLITUS (HCC): Primary | ICD-10-CM

## 2023-08-11 DIAGNOSIS — M41.34 THORACOGENIC SCOLIOSIS OF THORACIC REGION: ICD-10-CM

## 2023-08-11 DIAGNOSIS — J43.2 CENTRILOBULAR EMPHYSEMA (HCC): ICD-10-CM

## 2023-08-11 LAB — HBA1C MFR BLD: 10.5 %

## 2023-08-11 ASSESSMENT — PATIENT HEALTH QUESTIONNAIRE - PHQ9
6. FEELING BAD ABOUT YOURSELF - OR THAT YOU ARE A FAILURE OR HAVE LET YOURSELF OR YOUR FAMILY DOWN: 1
7. TROUBLE CONCENTRATING ON THINGS, SUCH AS READING THE NEWSPAPER OR WATCHING TELEVISION: 1
8. MOVING OR SPEAKING SO SLOWLY THAT OTHER PEOPLE COULD HAVE NOTICED. OR THE OPPOSITE, BEING SO FIGETY OR RESTLESS THAT YOU HAVE BEEN MOVING AROUND A LOT MORE THAN USUAL: 1
9. THOUGHTS THAT YOU WOULD BE BETTER OFF DEAD, OR OF HURTING YOURSELF: 0
1. LITTLE INTEREST OR PLEASURE IN DOING THINGS: 1
SUM OF ALL RESPONSES TO PHQ9 QUESTIONS 1 & 2: 2
SUM OF ALL RESPONSES TO PHQ QUESTIONS 1-9: 9
10. IF YOU CHECKED OFF ANY PROBLEMS, HOW DIFFICULT HAVE THESE PROBLEMS MADE IT FOR YOU TO DO YOUR WORK, TAKE CARE OF THINGS AT HOME, OR GET ALONG WITH OTHER PEOPLE: 1
5. POOR APPETITE OR OVEREATING: 0
4. FEELING TIRED OR HAVING LITTLE ENERGY: 3
SUM OF ALL RESPONSES TO PHQ QUESTIONS 1-9: 9
2. FEELING DOWN, DEPRESSED OR HOPELESS: 1
3. TROUBLE FALLING OR STAYING ASLEEP: 1

## 2023-08-11 ASSESSMENT — COLUMBIA-SUICIDE SEVERITY RATING SCALE - C-SSRS
6. HAVE YOU EVER DONE ANYTHING, STARTED TO DO ANYTHING, OR PREPARED TO DO ANYTHING TO END YOUR LIFE?: NO
2. HAVE YOU ACTUALLY HAD ANY THOUGHTS OF KILLING YOURSELF?: NO
1. WITHIN THE PAST MONTH, HAVE YOU WISHED YOU WERE DEAD OR WISHED YOU COULD GO TO SLEEP AND NOT WAKE UP?: NO

## 2023-08-11 NOTE — PROGRESS NOTES
Chief Complaint   Patient presents with    Diabetes       Assessment/Plan:  Neymar Razo was seen today for diabetes. Diagnoses and all orders for this visit:    Hyperlipidemia due to type 2 diabetes mellitus ([address])  Comments:  She has episodes of hypoglycemia. Orders:  -     POCT glycosylated hemoglobin (Hb A1C)  -     External Referral to Dietitian    Centrilobular emphysema ([address])  Comments:  She no longer smokes but she lives with someone who does. Thoracogenic scoliosis of thoracic region  -     XR THORACOLUMBAR SPINE (MIN 2 VIEWS); Future        Vitals:    08/11/23 0845   BP: (!) 140/74   Pulse:    Temp:    SpO2:        Physical Exam  Vitals reviewed. Constitutional:       General: She is not in acute distress. Appearance: She is well-developed. She is not diaphoretic. HENT:      Head: Normocephalic and atraumatic. Cardiovascular:      Rate and Rhythm: Normal rate and regular rhythm. Pulses: Normal pulses. Heart sounds: Normal heart sounds. No murmur heard. No friction rub. No gallop. Pulmonary:      Effort: Pulmonary effort is normal. No respiratory distress. Breath sounds: Wheezing and rales present. Chest:      Chest wall: No tenderness. Neurological:      Mental Status: She is alert and oriented to person, place, and time. Cranial Nerves: No cranial nerve deficit. Psychiatric:         Behavior: Behavior normal.         Thought Content:  Thought content normal.         Judgment: Judgment normal.       PHQ Scores 8/11/2023 5/3/2023 2/20/2023 10/12/2022 9/19/2022 5/20/2022 5/20/2022   PHQ2 Score 2 3 4 6 2 0 0   PHQ9 Score 9 9 11 16 7 1 0     Interpretation of Total Score Depression Severity: 1-4 = Minimal depression, 5-9 = Mild depression, 10-14 = Moderate depression, 15-19 = Moderately severe depression, 20-27 = Severe depression    MD Angel Ferro

## 2023-08-14 DIAGNOSIS — E11.69 HYPERLIPIDEMIA DUE TO TYPE 2 DIABETES MELLITUS (HCC): ICD-10-CM

## 2023-08-14 DIAGNOSIS — E78.5 HYPERLIPIDEMIA DUE TO TYPE 2 DIABETES MELLITUS (HCC): ICD-10-CM

## 2023-08-14 DIAGNOSIS — I15.2 HYPERTENSION ASSOCIATED WITH DIABETES (HCC): ICD-10-CM

## 2023-08-14 DIAGNOSIS — E11.59 HYPERTENSION ASSOCIATED WITH DIABETES (HCC): ICD-10-CM

## 2023-08-14 RX ORDER — PROCHLORPERAZINE 25 MG/1
SUPPOSITORY RECTAL
Qty: 3 EACH | Refills: 3 | Status: SHIPPED | OUTPATIENT
Start: 2023-08-14

## 2023-08-16 RX ORDER — ALBUTEROL SULFATE 90 UG/1
2 AEROSOL, METERED RESPIRATORY (INHALATION) EVERY 6 HOURS PRN
Qty: 6.7 G | Refills: 5 | Status: SHIPPED | OUTPATIENT
Start: 2023-08-16 | End: 2024-08-15

## 2023-08-29 DIAGNOSIS — M85.80 OSTEOPENIA, UNSPECIFIED LOCATION: Primary | ICD-10-CM

## 2023-08-29 DIAGNOSIS — Z78.0 POSTMENOPAUSAL: ICD-10-CM

## 2023-08-30 DIAGNOSIS — E11.69 TYPE 2 DIABETES MELLITUS WITH OTHER SPECIFIED COMPLICATION, UNSPECIFIED WHETHER LONG TERM INSULIN USE (HCC): ICD-10-CM

## 2023-08-30 DIAGNOSIS — E05.90 HYPERTHYROIDISM: Primary | ICD-10-CM

## 2023-08-30 DIAGNOSIS — E11.59 HYPERTENSION ASSOCIATED WITH DIABETES (HCC): ICD-10-CM

## 2023-08-30 DIAGNOSIS — E05.90 HYPERTHYROIDISM: ICD-10-CM

## 2023-08-30 DIAGNOSIS — I15.2 HYPERTENSION ASSOCIATED WITH DIABETES (HCC): ICD-10-CM

## 2023-08-30 DIAGNOSIS — E55.9 VITAMIN D DEFICIENCY: ICD-10-CM

## 2023-08-30 RX ORDER — METHIMAZOLE 5 MG/1
TABLET ORAL
Qty: 135 TABLET | Refills: 0 | Status: SHIPPED | OUTPATIENT
Start: 2023-08-30

## 2023-08-31 RX ORDER — INSULIN GLARGINE 100 [IU]/ML
30 INJECTION, SOLUTION SUBCUTANEOUS NIGHTLY
Qty: 15 ML | Refills: 2 | Status: SHIPPED | OUTPATIENT
Start: 2023-08-31

## 2023-09-05 DIAGNOSIS — F41.9 ANXIETY: ICD-10-CM

## 2023-09-05 RX ORDER — ALPRAZOLAM 0.5 MG/1
TABLET ORAL
Qty: 90 TABLET | Refills: 0 | Status: SHIPPED | OUTPATIENT
Start: 2023-09-05 | End: 2023-10-05

## 2023-09-06 ENCOUNTER — TELEPHONE (OUTPATIENT)
Dept: ENDOCRINOLOGY | Age: 73
End: 2023-09-06

## 2023-09-06 NOTE — TELEPHONE ENCOUNTER
----- Message from Osorio Arias MD sent at 9/6/2023  5:16 PM EDT -----  Please advise patient that I reviewed the results of her thyroid ultrasound which shows some of the nodules have increased in size so radiologist is recommending a fine-needle aspiration biopsy which can be scheduled in my clinic provided patient is not on any blood thinners

## 2023-09-07 NOTE — TELEPHONE ENCOUNTER
Pt states she will call as back tomor because she may get her biopsy done @ [facility].  Pt gets her labs done there also

## 2023-09-07 NOTE — TELEPHONE ENCOUNTER
Pt called back and said she is on aspirin once a day 81mg baby aspirin. Please advise and call pt back.

## 2023-09-07 NOTE — TELEPHONE ENCOUNTER
Left VM for patient to call office back. We can do the biopsy in the office as long as she stops the aspirin at least 3 days prior but Dr. Lenin Piper next available opening is not until the end of October. We can also have her do it sooner in radiology if she would like. Patient to call back to discuss.

## 2023-09-09 LAB
A/G RATIO: 1.3 (ref 1–2)
ALBUMIN SERPL-MCNC: 4 G/DL (ref 3.5–5.7)
ALBUMIN/PROTEIN TOTAL, SER/PL: 7.2 G/DL (ref 6–8.3)
ALP BLD-CCNC: 104 U/L (ref 34–104)
ALT SERPL-CCNC: 14 U/L (ref 7–52)
ANION GAP 4: 8 MMOL/L (ref 7–16)
AST W/O P-5'-P: 14 U/L (ref 13–39)
BILIRUB SERPL-MCNC: 0.5 MG/DL (ref 0.3–1)
BUN BLDV-MCNC: 12 MG/DL (ref 7–25)
CALCIUM SERPL-MCNC: 9.4 MG/DL (ref 8.6–10.2)
CHLORIDE BLD-SCNC: 103 MMOL/L (ref 98–107)
CO2: 27 MMOL/L (ref 21–31)
CREAT SERPL-MCNC: 0.97 MG/DL (ref 0.6–1.2)
ESTIMATED GLOMERULAR FILTRATION RATE CREATININE EQUATION: 61 ML/MIN/1.73M2
GLOBULIN: 3.2 G/DL (ref 2.6–4.2)
GLUCOSE: 226 MG/DL (ref 74–109)
PARATHYROID HORMONE INTACT: 37.7 PG/ML (ref 12–88)
POTASSIUM SERPL-SCNC: 3.8 MMOL/L (ref 3.5–5.1)
SODIUM BLD-SCNC: 138 MMOL/L (ref 136–145)
T4 FREE: 0.63 NG/DL (ref 0.61–1.12)
TSH ULTRASENSITIVE: 3.03 UIU/ML (ref 0.45–5.33)
VITAMIN D 25-HYDROXY: 17 NG/ML

## 2023-09-13 ENCOUNTER — OFFICE VISIT (OUTPATIENT)
Dept: ENDOCRINOLOGY | Age: 73
End: 2023-09-13
Payer: COMMERCIAL

## 2023-09-13 VITALS
BODY MASS INDEX: 27.48 KG/M2 | HEIGHT: 60 IN | SYSTOLIC BLOOD PRESSURE: 137 MMHG | TEMPERATURE: 98 F | HEART RATE: 91 BPM | RESPIRATION RATE: 14 BRPM | DIASTOLIC BLOOD PRESSURE: 86 MMHG | WEIGHT: 140 LBS

## 2023-09-13 DIAGNOSIS — E05.90 HYPERTHYROIDISM: Primary | ICD-10-CM

## 2023-09-13 PROCEDURE — G8417 CALC BMI ABV UP PARAM F/U: HCPCS | Performed by: INTERNAL MEDICINE

## 2023-09-13 PROCEDURE — 99214 OFFICE O/P EST MOD 30 MIN: CPT | Performed by: INTERNAL MEDICINE

## 2023-09-13 PROCEDURE — G8399 PT W/DXA RESULTS DOCUMENT: HCPCS | Performed by: INTERNAL MEDICINE

## 2023-09-13 PROCEDURE — 3017F COLORECTAL CA SCREEN DOC REV: CPT | Performed by: INTERNAL MEDICINE

## 2023-09-13 PROCEDURE — G8427 DOCREV CUR MEDS BY ELIG CLIN: HCPCS | Performed by: INTERNAL MEDICINE

## 2023-09-13 PROCEDURE — 3075F SYST BP GE 130 - 139MM HG: CPT | Performed by: INTERNAL MEDICINE

## 2023-09-13 PROCEDURE — 1036F TOBACCO NON-USER: CPT | Performed by: INTERNAL MEDICINE

## 2023-09-13 PROCEDURE — 1123F ACP DISCUSS/DSCN MKR DOCD: CPT | Performed by: INTERNAL MEDICINE

## 2023-09-13 PROCEDURE — 1090F PRES/ABSN URINE INCON ASSESS: CPT | Performed by: INTERNAL MEDICINE

## 2023-09-13 PROCEDURE — 3079F DIAST BP 80-89 MM HG: CPT | Performed by: INTERNAL MEDICINE

## 2023-09-13 NOTE — PROGRESS NOTES
SUBJECTIVE:  Yovany oCnner is a 75 y.o. female seen for longstanding history of remitting and relapsing hyerthyroidism. Patient was diagnosed with Hyperthyroidism approximately at age 57. On review of chart it appears that patient had suppressed TSH since 2013 and was on PTU/Tapazole which was stopped after a TSH of 77 in May 2017 but she would eventually become hyperthyroid after stopping the medication. It appears that she had been off and on on PTU/Tapazole for all these years  Previously had a thyroid uptake and scan with a 51% uptake with no hot or cold nodules identified in June 2016. Patient was treated with propylthiouracil 3 times a day. Patient stayed euthyroid for a few years and then in May 2021 she started experiencing weight loss, heat intolerance and palpitations. Symptoms at presentation were weight loss since May 2021. current complaints: palpitations, sweating, weight loss, diarrhea, change in skin,  nails, or hair  History of obstructive symptoms: difficulty swallowing No, changes in voice/hoarseness No.  History of radiation to patient's neck: NO  Recent iodine exposure: No  Family history includes no thyroid abnormalities. Family history of thyroid cancer: No    She has  diet controlled diabetes. She has Hyperlipidemia and hypertension. Patient has a history of lung carcinoma she is status post right lobectomy in June 2015 and follows with oncology she was treated with cisplatin  Patient had Covid pneumonia in October 2020. Patient had a DEXA scan done at [facility] in August 2021 which showed a T score of 1.4 in lumbar spine, left hip was -1.7, right femoral neck was -1.7. Patient denies any history of fractures  Menopause was approximately around age 49 previously she had hysterectomy at age 21. Her mom has osteoporosis. She is not on any steroids    INTERIM  She has been taking 7.5 mg of Tapazole daily. C/o of fatigue.   She denies any palpitations

## 2023-09-18 ENCOUNTER — PATIENT MESSAGE (OUTPATIENT)
Dept: PULMONOLOGY | Age: 73
End: 2023-09-18

## 2023-09-18 RX ORDER — PREDNISONE 10 MG/1
TABLET ORAL
Qty: 30 TABLET | Refills: 0 | Status: SHIPPED | OUTPATIENT
Start: 2023-09-18 | End: 2023-09-28

## 2023-09-18 NOTE — TELEPHONE ENCOUNTER
From: Jazmin Lee  To: Dr. Coffey Jointer: 9/18/2023 10:38 AM EDT  Subject: Wheezing     I have not stopped wheezing since I left your office on my last appointment. Can you call me in something so I can get to feeling better. I can't even walk around the room without feeling like i'm not going to make it. I know you recommended using mucinex right after my appointment but the truth is, I have been using mucinex for seven or eight years now and sometimes I have to double up but this time it's not working. Yes I am using my oxygen but as far as the portable unit it is useless because when I try to carry anything at all    it makes the wheezing worse. I cannot do anything because it makes the wheezing worse and the breathing so much worse.

## 2023-10-10 ENCOUNTER — PROCEDURE VISIT (OUTPATIENT)
Dept: ENDOCRINOLOGY | Age: 73
End: 2023-10-10

## 2023-10-10 DIAGNOSIS — E04.1 THYROID NODULE: Primary | ICD-10-CM

## 2023-10-10 NOTE — PROGRESS NOTES
Operating Physician: Lou Alexandre MD  Assistant : Ml Alfaro LPN     Indication: Enlarging left and right thyroid Lobe Nodule     Details: An informed consent was obtained after explaining the risks and benefits of the procedure to patient. Patient was told that the procedure can result in pain, infection, bleeding, damage to nerves, blood vessels, trachea, esophagus and other structures in neck. Patient was also told about the possibility of non-diagnostic results. Patient consented to the procedure and was placed in appropriate position with neck extended. Under direct ultrasound guidance thyroid gland was visualized and both the right and left lobe nodules were localized localized. Ice pack was used to anesthetize the area . 3 passes were made into each nodule  -- 2 passes were made with 27 G 1 1/4 needle and one pass was made with 25 G 1 1/2 inch needle  . Specimen was transferred into the cytolyt solution , specimen  was also obtained for molecular testing. Patient tolerated the procedure well and no immediate complications were noted. Patient was instructed to call us back in case of any new symptoms or signs.

## 2023-10-12 DIAGNOSIS — F41.9 ANXIETY: ICD-10-CM

## 2023-10-12 RX ORDER — ALPRAZOLAM 0.5 MG/1
TABLET ORAL
Qty: 90 TABLET | Refills: 0 | Status: SHIPPED | OUTPATIENT
Start: 2023-10-12 | End: 2023-11-11

## 2023-10-12 NOTE — TELEPHONE ENCOUNTER
Recent Visits  Date Type Provider Dept   08/11/23 Office Visit Pratibha Betancourt MD [facility]   05/03/23 Office Visit Pratibha Betancourt MD [facility]   02/20/23 Office Visit Pratibha Betancourt MD [facility]   10/12/22 Office Visit Pratibha Betancourt MD [facility]   09/19/22 Office Visit Pratibha Betancourt MD [facility]   05/20/22 Office Visit Pratibha Betancourt MD [facility]   Showing recent visits within past 540 days with a meds authorizing provider and meeting all other requirements  Future Appointments  Date Type Provider Dept   01/17/24 Appointment Pratibha Betancourt MD [facility]   Showing future appointments within next 150 days with a meds authorizing provider and meeting all other requirements     8/11/2023

## 2023-10-13 ENCOUNTER — TELEPHONE (OUTPATIENT)
Dept: ENDOCRINOLOGY | Age: 73
End: 2023-10-13

## 2023-10-16 NOTE — TELEPHONE ENCOUNTER
Please advise patient that her biopsy results came back benign on both sides.   We will continue to monitor these nodules via thyroid ultrasounds in future

## 2023-11-13 DIAGNOSIS — I15.2 HYPERTENSION ASSOCIATED WITH DIABETES (HCC): ICD-10-CM

## 2023-11-13 DIAGNOSIS — E78.5 HYPERLIPIDEMIA DUE TO TYPE 2 DIABETES MELLITUS (HCC): ICD-10-CM

## 2023-11-13 DIAGNOSIS — E11.69 HYPERLIPIDEMIA DUE TO TYPE 2 DIABETES MELLITUS (HCC): ICD-10-CM

## 2023-11-13 DIAGNOSIS — E11.59 HYPERTENSION ASSOCIATED WITH DIABETES (HCC): ICD-10-CM

## 2023-11-13 LAB
T4 FREE: 0.93 NG/DL (ref 0.61–1.12)
TSH ULTRASENSITIVE: 0.69 UIU/ML (ref 0.45–5.33)

## 2023-11-13 RX ORDER — PROCHLORPERAZINE 25 MG/1
SUPPOSITORY RECTAL
Qty: 1 EACH | Refills: 3 | Status: SHIPPED | OUTPATIENT
Start: 2023-11-13

## 2023-11-14 ENCOUNTER — PATIENT MESSAGE (OUTPATIENT)
Dept: ENDOCRINOLOGY | Age: 73
End: 2023-11-14

## 2023-11-14 ENCOUNTER — TELEPHONE (OUTPATIENT)
Dept: ENDOCRINOLOGY | Age: 73
End: 2023-11-14

## 2023-11-14 NOTE — TELEPHONE ENCOUNTER
Please advise patient that her thyroid hormone levels look in a very good range she should continue with the current dose of methimazole, I do not think her symptoms are related to the thyroid maybe she should also involve her primary care physician and see if something else can be figured out for her symptoms.

## 2023-11-14 NOTE — TELEPHONE ENCOUNTER
Tarena message sent from patient:    I see the numbers on the blood work I had done yesterday look within normal limits. I started doing the falling asleep, not wanting to eat thing about 3 days ago, so are the numbers  OK. ?  I don't know what could be making me so tired.  I can't get my eyes open

## 2023-11-15 LAB — T3 TOTAL: 84 NG/DL (ref 71–180)

## 2023-11-28 ENCOUNTER — OFFICE VISIT (OUTPATIENT)
Dept: PULMONOLOGY | Age: 73
End: 2023-11-28
Payer: COMMERCIAL

## 2023-11-28 VITALS
OXYGEN SATURATION: 91 % | SYSTOLIC BLOOD PRESSURE: 130 MMHG | BODY MASS INDEX: 27.17 KG/M2 | HEIGHT: 60 IN | WEIGHT: 138.4 LBS | HEART RATE: 88 BPM | DIASTOLIC BLOOD PRESSURE: 74 MMHG

## 2023-11-28 DIAGNOSIS — J44.9 COPD, MODERATE (HCC): Primary | ICD-10-CM

## 2023-11-28 DIAGNOSIS — J43.2 CENTRILOBULAR EMPHYSEMA (HCC): ICD-10-CM

## 2023-11-28 DIAGNOSIS — J96.11 CHRONIC HYPOXEMIC RESPIRATORY FAILURE (HCC): ICD-10-CM

## 2023-11-28 DIAGNOSIS — C34.2 MALIGNANT NEOPLASM OF MIDDLE LOBE OF RIGHT LUNG (HCC): ICD-10-CM

## 2023-11-28 PROCEDURE — 99214 OFFICE O/P EST MOD 30 MIN: CPT | Performed by: INTERNAL MEDICINE

## 2023-11-28 PROCEDURE — G8484 FLU IMMUNIZE NO ADMIN: HCPCS | Performed by: INTERNAL MEDICINE

## 2023-11-28 PROCEDURE — 3075F SYST BP GE 130 - 139MM HG: CPT | Performed by: INTERNAL MEDICINE

## 2023-11-28 PROCEDURE — 3023F SPIROM DOC REV: CPT | Performed by: INTERNAL MEDICINE

## 2023-11-28 PROCEDURE — G8417 CALC BMI ABV UP PARAM F/U: HCPCS | Performed by: INTERNAL MEDICINE

## 2023-11-28 PROCEDURE — 1123F ACP DISCUSS/DSCN MKR DOCD: CPT | Performed by: INTERNAL MEDICINE

## 2023-11-28 PROCEDURE — G8399 PT W/DXA RESULTS DOCUMENT: HCPCS | Performed by: INTERNAL MEDICINE

## 2023-11-28 PROCEDURE — 1090F PRES/ABSN URINE INCON ASSESS: CPT | Performed by: INTERNAL MEDICINE

## 2023-11-28 PROCEDURE — 3078F DIAST BP <80 MM HG: CPT | Performed by: INTERNAL MEDICINE

## 2023-11-28 PROCEDURE — G8427 DOCREV CUR MEDS BY ELIG CLIN: HCPCS | Performed by: INTERNAL MEDICINE

## 2023-11-28 PROCEDURE — 3017F COLORECTAL CA SCREEN DOC REV: CPT | Performed by: INTERNAL MEDICINE

## 2023-11-28 PROCEDURE — 1036F TOBACCO NON-USER: CPT | Performed by: INTERNAL MEDICINE

## 2023-11-28 RX ORDER — FLUTICASONE FUROATE, UMECLIDINIUM BROMIDE AND VILANTEROL TRIFENATATE 100; 62.5; 25 UG/1; UG/1; UG/1
1 POWDER RESPIRATORY (INHALATION) DAILY
Qty: 1 EACH | Refills: 6 | Status: SHIPPED | OUTPATIENT
Start: 2023-11-28

## 2024-10-06 NOTE — PROGRESS NOTES
Fax from [facility] requesting new diagnosis codes for Dexa scan to be refaxed to them. Order updated and faxed. No action was needed